# Patient Record
Sex: FEMALE | Race: WHITE | Employment: OTHER | URBAN - METROPOLITAN AREA
[De-identification: names, ages, dates, MRNs, and addresses within clinical notes are randomized per-mention and may not be internally consistent; named-entity substitution may affect disease eponyms.]

---

## 2017-02-01 RX ORDER — NITROGLYCERIN 0.4 MG/1
0.4 TABLET SUBLINGUAL
Qty: 25 TAB | Refills: 3 | Status: SHIPPED | OUTPATIENT
Start: 2017-02-01 | End: 2022-10-27

## 2017-03-16 ENCOUNTER — OFFICE VISIT (OUTPATIENT)
Dept: CARDIOLOGY CLINIC | Age: 82
End: 2017-03-16

## 2017-03-16 VITALS
DIASTOLIC BLOOD PRESSURE: 58 MMHG | HEIGHT: 66 IN | HEART RATE: 84 BPM | OXYGEN SATURATION: 98 % | BODY MASS INDEX: 25.07 KG/M2 | WEIGHT: 156 LBS | SYSTOLIC BLOOD PRESSURE: 120 MMHG

## 2017-03-16 DIAGNOSIS — I48.91 ATRIAL FIBRILLATION, UNSPECIFIED TYPE (HCC): Primary | ICD-10-CM

## 2017-03-30 ENCOUNTER — HOSPITAL ENCOUNTER (OUTPATIENT)
Dept: ULTRASOUND IMAGING | Age: 82
Discharge: HOME | End: 2017-03-30
Attending: UROLOGY
Payer: MEDICARE

## 2017-03-30 DIAGNOSIS — N32.81 OVERACTIVE BLADDER: ICD-10-CM

## 2017-03-30 DIAGNOSIS — R31.29 MICROSCOPIC HEMATURIA: ICD-10-CM

## 2017-03-30 PROCEDURE — 76770 US EXAM ABDO BACK WALL COMP: CPT

## 2017-04-10 ENCOUNTER — HOSPITAL ENCOUNTER (EMERGENCY)
Age: 82
Discharge: HOME | End: 2017-04-10
Attending: EMERGENCY MEDICINE
Payer: MEDICARE

## 2017-04-10 ENCOUNTER — APPOINTMENT (OUTPATIENT)
Dept: GENERAL RADIOLOGY | Age: 82
End: 2017-04-10
Attending: EMERGENCY MEDICINE
Payer: MEDICARE

## 2017-04-10 ENCOUNTER — APPOINTMENT (OUTPATIENT)
Dept: CT IMAGING | Age: 82
End: 2017-04-10
Attending: EMERGENCY MEDICINE
Payer: MEDICARE

## 2017-04-10 VITALS
OXYGEN SATURATION: 100 % | RESPIRATION RATE: 16 BRPM | DIASTOLIC BLOOD PRESSURE: 49 MMHG | TEMPERATURE: 97.8 F | SYSTOLIC BLOOD PRESSURE: 117 MMHG | HEART RATE: 82 BPM

## 2017-04-10 DIAGNOSIS — R53.1 WEAKNESS: Primary | ICD-10-CM

## 2017-04-10 LAB
ALBUMIN SERPL BCP-MCNC: 3.2 G/DL (ref 3.4–5)
ALBUMIN/GLOB SERPL: 1.1 {RATIO} (ref 0.8–1.7)
ALP SERPL-CCNC: 72 U/L (ref 45–117)
ALT SERPL-CCNC: 15 U/L (ref 13–56)
ANION GAP BLD CALC-SCNC: 5 MMOL/L (ref 3–18)
APPEARANCE UR: CLEAR
AST SERPL W P-5'-P-CCNC: 17 U/L (ref 15–37)
BASOPHILS # BLD AUTO: 0.1 K/UL (ref 0–0.1)
BASOPHILS # BLD: 1 % (ref 0–2)
BILIRUB SERPL-MCNC: 0.6 MG/DL (ref 0.2–1)
BILIRUB UR QL: NEGATIVE
BUN SERPL-MCNC: 13 MG/DL (ref 7–18)
BUN/CREAT SERPL: 16 (ref 12–20)
CALCIUM SERPL-MCNC: 8.2 MG/DL (ref 8.5–10.1)
CHLORIDE SERPL-SCNC: 104 MMOL/L (ref 100–108)
CK MB CFR SERPL CALC: 1.9 % (ref 0–4)
CK MB SERPL-MCNC: 2 NG/ML (ref 5–25)
CK SERPL-CCNC: 107 U/L (ref 26–192)
CO2 SERPL-SCNC: 33 MMOL/L (ref 21–32)
COLOR UR: YELLOW
CREAT SERPL-MCNC: 0.83 MG/DL (ref 0.6–1.3)
DIFFERENTIAL METHOD BLD: ABNORMAL
EOSINOPHIL # BLD: 0.2 K/UL (ref 0–0.4)
EOSINOPHIL NFR BLD: 2 % (ref 0–5)
ERYTHROCYTE [DISTWIDTH] IN BLOOD BY AUTOMATED COUNT: 13.5 % (ref 11.6–14.5)
GLOBULIN SER CALC-MCNC: 3 G/DL (ref 2–4)
GLUCOSE SERPL-MCNC: 133 MG/DL (ref 74–99)
GLUCOSE UR STRIP.AUTO-MCNC: NEGATIVE MG/DL
HCT VFR BLD AUTO: 39 % (ref 35–45)
HGB BLD-MCNC: 12.4 G/DL (ref 12–16)
HGB UR QL STRIP: NEGATIVE
KETONES UR QL STRIP.AUTO: NEGATIVE MG/DL
LEUKOCYTE ESTERASE UR QL STRIP.AUTO: NEGATIVE
LYMPHOCYTES # BLD AUTO: 49 % (ref 21–52)
LYMPHOCYTES # BLD: 5.3 K/UL (ref 0.9–3.6)
MCH RBC QN AUTO: 34.2 PG (ref 24–34)
MCHC RBC AUTO-ENTMCNC: 31.8 G/DL (ref 31–37)
MCV RBC AUTO: 107.4 FL (ref 74–97)
MONOCYTES # BLD: 1 K/UL (ref 0.05–1.2)
MONOCYTES NFR BLD AUTO: 9 % (ref 3–10)
NEUTS SEG # BLD: 4.2 K/UL (ref 1.8–8)
NEUTS SEG NFR BLD AUTO: 39 % (ref 40–73)
NITRITE UR QL STRIP.AUTO: NEGATIVE
PH UR STRIP: 5.5 [PH] (ref 5–8)
PLATELET # BLD AUTO: 343 K/UL (ref 135–420)
PMV BLD AUTO: 9.9 FL (ref 9.2–11.8)
POTASSIUM SERPL-SCNC: 3.6 MMOL/L (ref 3.5–5.5)
PROT SERPL-MCNC: 6.2 G/DL (ref 6.4–8.2)
PROT UR STRIP-MCNC: NEGATIVE MG/DL
RBC # BLD AUTO: 3.63 M/UL (ref 4.2–5.3)
SODIUM SERPL-SCNC: 142 MMOL/L (ref 136–145)
SP GR UR REFRACTOMETRY: 1.01 (ref 1–1.03)
TROPONIN I SERPL-MCNC: <0.02 NG/ML (ref 0–0.04)
UROBILINOGEN UR QL STRIP.AUTO: 0.2 EU/DL (ref 0.2–1)
WBC # BLD AUTO: 10.8 K/UL (ref 4.6–13.2)

## 2017-04-10 PROCEDURE — 85025 COMPLETE CBC W/AUTO DIFF WBC: CPT | Performed by: EMERGENCY MEDICINE

## 2017-04-10 PROCEDURE — 96360 HYDRATION IV INFUSION INIT: CPT

## 2017-04-10 PROCEDURE — 93005 ELECTROCARDIOGRAM TRACING: CPT

## 2017-04-10 PROCEDURE — 80053 COMPREHEN METABOLIC PANEL: CPT | Performed by: EMERGENCY MEDICINE

## 2017-04-10 PROCEDURE — 74011250636 HC RX REV CODE- 250/636: Performed by: EMERGENCY MEDICINE

## 2017-04-10 PROCEDURE — 71010 XR CHEST PORT: CPT

## 2017-04-10 PROCEDURE — 99285 EMERGENCY DEPT VISIT HI MDM: CPT

## 2017-04-10 PROCEDURE — 70450 CT HEAD/BRAIN W/O DYE: CPT

## 2017-04-10 PROCEDURE — 82550 ASSAY OF CK (CPK): CPT | Performed by: EMERGENCY MEDICINE

## 2017-04-10 PROCEDURE — 81003 URINALYSIS AUTO W/O SCOPE: CPT | Performed by: EMERGENCY MEDICINE

## 2017-04-10 RX ADMIN — SODIUM CHLORIDE 1000 ML: 900 INJECTION, SOLUTION INTRAVENOUS at 20:20

## 2017-04-10 NOTE — ED PROVIDER NOTES
HPI Comments: 5:59 PM Teresita Solis is a 80 y.o. female with h/o HTN, incontinence and spinal stenosis  who presents to ED via EMS complaining of dizziness onset \"several weeks ago. \" She states she has had several syncopal episodes the past few days. Per relative the patient fell last night. Relative states the patient fell 4 times yesterday. Per relative the fall was witnessed by the pt's . The pt's  was unable to get the patient up after falling last night, so the patient \"slept on the floor last night. \" Relative reports the pt hit her head last night, but denies LOC. The pt and  live at home alone. Per relative she told the pt's PCP about today's complaints and the PCP referred the pt to the ED. Relative reports the pt has been incontinent for the past two months. She is followed by Dr. Eryn Armijo, [facility] [place]. Pt denies dysuria, hematuria, headaches, change in appetite and fever. Relative also reports the pt's \"balance has been off today and has had trouble holding a fork and cup today on the L side. \" Relative states this is new. The relative also reports the pt being fatigued, confused and weak today. The pt has a L foot injury from a prior fall several weeks ago. Pt saw her podiatrist, Dr. Iona Titus, after the incident. The Pt reports L great toe pain. Relative denies the pt being on pain medication. No other concerns nor complaints at this time. PCP: Ruiz Yanez MD      The history is provided by the patient and a relative. Past Medical History:   Diagnosis Date    Atypical angina ([address]     Bulging disc 9/21/2010    Cardiac catheterization 10/16/1990    Patent coronary arteries. Normal LV function.  Cardiac echocardiogram 10/02/2012    EF 60%. No WMA. Mild-mod LAE.  Cardiac nuclear imaging test, low risk 10/02/2012    No ischemia or prior infarction. EF 77%. No WMA. Low risk pharm stress test.  Profound vagal response to Lexiscan.     Carotid duplex 01/25/2006    No significant stenosis bilaterally.  Dizziness     Dyspnea     Fecal incontinence     Follow-up exam 9/21/2010    Hematuria     Hepatitis B     Hip pain 4/13/2010    Hypertension     Overactive bladder     Spinal stenosis        Past Surgical History:   Procedure Laterality Date    BREAST SURGERY PROCEDURE UNLISTED      both breasts 3 times each    CT ABD PELV W CONT      surgery for diverticulitis    HX APPENDECTOMY      HX BACK SURGERY      HX CHOLECYSTECTOMY      HX HEART CATHETERIZATION  10/16/90    HX ORTHOPAEDIC  [id number]    5 back surgeries    HX ORTHOPAEDIC  3/06    left rotator cuff repair         Family History:   Problem Relation Age of Onset    Other Mother      hx of heart disorder    Hypertension Mother     Stroke Mother     Other Father      hx of heart disorder    Hypertension Father     Diabetes Father     Cancer Other        Social History     Social History    Marital status:      Spouse name: N/A    Number of children: N/A    Years of education: N/A     Occupational History    Not on file. Social History Main Topics    Smoking status: Former Smoker     Packs/day: 1.00     Years: 6.00     Quit date: 12/14/1971    Smokeless tobacco: Never Used    Alcohol use 1.5 oz/week     3 Glasses of wine per week    Drug use: No    Sexual activity: Not on file     Other Topics Concern    Not on file     Social History Narrative         ALLERGIES: Iodinated contrast media - oral and iv dye    Review of Systems   Constitutional: Positive for fatigue. Negative for chills, fever and unexpected weight change. HENT: Negative for congestion and rhinorrhea. Respiratory: Negative for chest tightness and shortness of breath. Cardiovascular: Negative for chest pain, palpitations and leg swelling. Gastrointestinal: Negative for abdominal pain, nausea and vomiting. Genitourinary: Negative for dysuria.    Musculoskeletal: Negative for back pain. L great toe   Skin: Negative for rash. Neurological: Positive for dizziness, syncope and weakness (generalized). Psychiatric/Behavioral: Positive for confusion. The patient is not nervous/anxious. All other systems reviewed and are negative. Vitals:    04/10/17 1733   BP: 120/55   Pulse: 89   Resp: 15   Temp: 97.8 °F (36.6 °C)            Physical Exam   Constitutional: She is oriented to person, place, and time. She appears lethargic. Non-toxic appearance. She does not have a sickly appearance. She appears ill. No distress. HENT:   Head: Normocephalic. Right Ear: No hemotympanum. Left Ear: No hemotympanum. Mouth/Throat: Oropharynx is clear and moist. No oropharyngeal exudate. Eyes: Conjunctivae and EOM are normal. Pupils are equal, round, and reactive to light. No scleral icterus. Neck: Trachea normal and normal range of motion. Neck supple. No hepatojugular reflux and no JVD present. No tracheal deviation present. No thyromegaly present. Cardiovascular: Normal rate, regular rhythm, S1 normal, S2 normal, normal heart sounds, intact distal pulses and normal pulses. Exam reveals no gallop, no S3 and no S4. No murmur heard. Pulses:       Radial pulses are 2+ on the right side, and 2+ on the left side. Dorsalis pedis pulses are 2+ on the right side, and 2+ on the left side. Pulmonary/Chest: Effort normal and breath sounds normal. No respiratory distress. She has no decreased breath sounds. She has no wheezes. She has no rhonchi. She has no rales. Abdominal: Soft. Normal appearance and bowel sounds are normal. She exhibits no distension, no fluid wave, no ascites and no mass. There is no hepatosplenomegaly. There is no tenderness. There is no rigidity, no rebound, no guarding, no CVA tenderness, no tenderness at McBurney's point and negative Draper's sign. Musculoskeletal: She exhibits no edema or tenderness.    Strength 3/5 throughout    Lymphadenopathy: Head (right side): No submental, no submandibular, no preauricular and no occipital adenopathy present. Head (left side): No submental, no submandibular, no preauricular and no occipital adenopathy present. She has no cervical adenopathy. Right: No supraclavicular adenopathy present. Left: No supraclavicular adenopathy present. Neurological: She is oriented to person, place, and time. She has normal strength and normal reflexes. She appears lethargic. She is not disoriented. No cranial nerve deficit or sensory deficit. Coordination and gait normal. GCS eye subscore is 3. GCS verbal subscore is 4. GCS motor subscore is 6. Grossly intact    Skin: Skin is warm, dry and intact. No rash noted. She is not diaphoretic. Psychiatric: She has a normal mood and affect. Her speech is normal and behavior is normal. Judgment and thought content normal. Cognition and memory are impaired. Nursing note and vitals reviewed. MDM  Number of Diagnoses or Management Options  Weakness:   Diagnosis management comments: UTI  Infection   Intracranial bleed  Neoplasm     ED Course       Procedures   Labs essentially normal. Cardiac enzymes were negative. UA  Was negative. Chest X-Ray showed No acute process. EKG showed NSR with rate of 86 bpm. With no ST elevations or depressions. CT of the head showed No acute intracranial process. Mild sequela of chronic microvascular ischemic disease and moderate generalized volume loss. Mild sinonasal mucosal disease  8:50 PM 4/10/2017     I have reassessed the patient. Patient is feeling better. Patient was discharged in stable condition. Patient is to return to emergency department if any new or worsening condition.         Scribe Attestation  Tone Horton scribing for and in the presence of Lisbeth Schuster DO (04/10/17/ 5:59 PM)    Physician Attestation  I personally performed the services described in this documentation, reviewed, and edited the documentation which was dictated to the scribe in my presence, and it accurately records my own words and actions.      [address]o DO (04/10/17/ 5:59 PM)    Signed by: Edwin Mata, 04/10/17, 5:59 PM

## 2017-04-10 NOTE — ED TRIAGE NOTES
Patient arrived via EMS due to increasing generalized weakness and confusion since Saturday; falls x4 since Saturday. Per medic, no facial droop, no arm or leg drift, and patient has equal  strengths bilaterally. Glu 122. Patient c/o feeling dizzy and \"just dont feel well\".

## 2017-04-11 LAB
ATRIAL RATE: 86 BPM
CALCULATED P AXIS, ECG09: 43 DEGREES
CALCULATED R AXIS, ECG10: -24 DEGREES
CALCULATED T AXIS, ECG11: 16 DEGREES
DIAGNOSIS, 93000: NORMAL
P-R INTERVAL, ECG05: 148 MS
Q-T INTERVAL, ECG07: 364 MS
QRS DURATION, ECG06: 82 MS
QTC CALCULATION (BEZET), ECG08: 435 MS
VENTRICULAR RATE, ECG03: 86 BPM

## 2017-04-11 NOTE — DISCHARGE INSTRUCTIONS
Weakness: Care Instructions  Your Care Instructions  Weakness is a lack of physical or muscle strength. You may feel that you need to make extra effort to move your arms, legs, or other muscles. Generalized weakness means that you feel weak in most areas of your body. Another type of weakness may affect just one muscle or group of muscles. You may feel weak and tired after you have done too much activity, such as taking an extra-long hike. This is not a serious problem. It often goes away on its own. Feeling weak can also be caused by medical conditions like thyroid problems, depression, or a virus. Sometimes the cause can be serious. Your doctor may want to do more tests to try to find the cause of the weakness. The doctor has checked you carefully, but problems can develop later. If you notice any problems or new symptoms, get medical treatment right away. Follow-up care is a key part of your treatment and safety. Be sure to make and go to all appointments, and call your doctor if you are having problems. It's also a good idea to know your test results and keep a list of the medicines you take. How can you care for yourself at home? · Rest when you feel tired. · Be safe with medicines. If your doctor prescribed medicine, take it exactly as prescribed. Call your doctor if you think you are having a problem with your medicine. You will get more details on the specific medicines your doctor prescribes. · Do not skip meals. Eating a balanced diet may increase your energy level. · Get some physical activity every day, but do not get too tired. When should you call for help? Call your doctor now or seek immediate medical care if:  · You have new or worse weakness. · You are dizzy or lightheaded, or you feel like you may faint. Watch closely for changes in your health, and be sure to contact your doctor if:  · You do not get better as expected. Where can you learn more?   Go to http://saige.info/. Enter [id number] in the search box to learn more about \"Weakness: Care Instructions. \"  Current as of: May 27, 2016  Content Version: 11.2  © 7469-7772 [employer]. Care instructions adapted under license by [employer] (which disclaims liability or warranty for this information). If you have questions about a medical condition or this instruction, always ask your healthcare professional. [address] any warranty or liability for your use of this information.

## 2017-04-11 NOTE — ED NOTES
Nursing report received from [address] [place] [address]. Patient resting comfortably on the stretcher, no signs of acute distress noted. Will continue to monitor.

## 2017-08-08 ENCOUNTER — OFFICE VISIT (OUTPATIENT)
Dept: ORTHOPEDIC SURGERY | Age: 82
End: 2017-08-08

## 2017-08-08 VITALS
WEIGHT: 141 LBS | TEMPERATURE: 97.7 F | DIASTOLIC BLOOD PRESSURE: 60 MMHG | OXYGEN SATURATION: 92 % | HEART RATE: 84 BPM | SYSTOLIC BLOOD PRESSURE: 148 MMHG | HEIGHT: 65 IN | BODY MASS INDEX: 23.49 KG/M2

## 2017-08-08 DIAGNOSIS — M25.511 RIGHT SHOULDER PAIN, UNSPECIFIED CHRONICITY: ICD-10-CM

## 2017-08-08 DIAGNOSIS — M19.011 PRIMARY OSTEOARTHRITIS OF BOTH SHOULDERS: Primary | ICD-10-CM

## 2017-08-08 DIAGNOSIS — M25.512 LEFT SHOULDER PAIN, UNSPECIFIED CHRONICITY: ICD-10-CM

## 2017-08-08 DIAGNOSIS — M19.012 PRIMARY OSTEOARTHRITIS OF BOTH SHOULDERS: Primary | ICD-10-CM

## 2017-08-08 RX ORDER — TRIAMCINOLONE ACETONIDE 40 MG/ML
40 INJECTION, SUSPENSION INTRA-ARTICULAR; INTRAMUSCULAR ONCE
Qty: 1 ML | Refills: 0
Start: 2017-08-08 | End: 2017-08-08

## 2017-08-08 NOTE — PROGRESS NOTES
Zara Jones  8/17/1930   Chief Complaint   Patient presents with    Shoulder Pain     bilateral        HISTORY OF PRESENT ILLNESS  Zara Jones is a 80 y.o. female who presents today for evaluation of B/L shoulder pain L>R. Patient referred by Dr. Jace Merchant for further evaluation. she rates her pain 6/10 today. Pain has been present for a long time. Patient describes the pain as sharp and achy that is Constant in nature. Symptoms are worse with certain motions of the shoulders and is better with  Rest. Associated symptoms include stiffness, catching. Since problem started, it: has worsened. Pain does not wake patient up at night. Has not taken medication for the problem. H/o surgery on the right years ago  Has tried following treatments: Injections:NO; Brace:NO; Therapy:NO; Cane/Crutch:YES       Allergies   Allergen Reactions    Iodinated Contrast- Oral And Iv Dye Swelling     Swollen all over with welps        Past Medical History:   Diagnosis Date    Atypical angina ([address]     Bulging disc 9/21/2010    Cardiac catheterization 10/16/1990    Patent coronary arteries. Normal LV function.  Cardiac echocardiogram 10/02/2012    EF 60%. No WMA. Mild-mod LAE.  Cardiac nuclear imaging test, low risk 10/02/2012    No ischemia or prior infarction. EF 77%. No WMA. Low risk pharm stress test.  Profound vagal response to Lexiscan.  Carotid duplex 01/25/2006    No significant stenosis bilaterally.  Dizziness     Dyspnea     Fecal incontinence     Follow-up exam 9/21/2010    Hematuria     Hepatitis B     Hip pain 4/13/2010    Hypertension     Mixed stress and urge urinary incontinence     Overactive bladder     Spinal stenosis       Social History     Social History    Marital status:      Spouse name: N/A    Number of children: N/A    Years of education: N/A     Occupational History    Not on file.      Social History Main Topics    Smoking status: Former Smoker     Packs/day: 1.00     Years: 6.00     Quit date: 12/14/1971    Smokeless tobacco: Never Used    Alcohol use 1.5 oz/week     3 Glasses of wine per week    Drug use: No    Sexual activity: Not on file     Other Topics Concern    Not on file     Social History Narrative      Past Surgical History:   Procedure Laterality Date    BREAST SURGERY PROCEDURE UNLISTED      both breasts 3 times each    CT ABD PELV W CONT      surgery for diverticulitis    HX APPENDECTOMY      HX BACK SURGERY      HX CHOLECYSTECTOMY      HX HEART CATHETERIZATION  10/16/90    HX ORTHOPAEDIC  [id number]    5 back surgeries    HX ORTHOPAEDIC  3/06    left rotator cuff repair      Family History   Problem Relation Age of Onset    Other Mother      hx of heart disorder    Hypertension Mother     Stroke Mother     Other Father      hx of heart disorder    Hypertension Father     Diabetes Father     Cancer Other       Current Outpatient Prescriptions   Medication Sig    triamcinolone acetonide (KENALOG) 40 mg/mL injection 1 mL by IntraMUSCular route once for 1 dose.  nitroglycerin (NITROSTAT) 0.4 mg SL tablet 1 Tab by SubLINGual route every five (5) minutes as needed.  aspirin delayed-release 81 mg tablet Take 1 Tab by mouth daily.  memantine (NAMENDA) 10 mg tablet Take 10 mg by mouth daily.  cholecalciferol, vitamin D3, (VITAMIN D-3) 2,000 unit Tab Take 1 Tab by mouth daily.  meloxicam (MOBIC) 15 mg tablet Take 15 mg by mouth daily.  MULTIVITAMIN PO Take 1 Tab by mouth daily.  donepezil (ARICEPT) 10 mg tablet Take 10 mg by mouth nightly.  meclizine (ANTIVERT) 25 mg tablet Take 25 mg by mouth as needed.  risedronate (ACTONEL) 35 mg tablet Take 35 mg by mouth every Sunday.  solifenacin (VESICARE) 10 mg tablet Take 1 Tab by mouth daily.  mirabegron ER (MYRBETRIQ) 50 mg ER tablet Take 1 Tab by mouth daily.  levoFLOXacin (LEVAQUIN) 500 mg tablet Take 1 Tab by mouth daily.     traMADol (ULTRAM) 50 mg tablet Take 1 Tab by mouth every six (6) hours as needed for Pain. Max Daily Amount: 200 mg.    mirabegron (MYRBETRIQ) 25 mg tablet Take 1 Tab by mouth daily.  omeprazole (PRILOSEC) 20 mg capsule Take 20 mg by mouth daily.  BETA-CAROTENE,A, W-C & E/MIN (ICAPS PO) Take 1 Tab by mouth daily.  metoprolol-XL (TOPROL XL) 50 mg XL tablet Take 50 mg by mouth daily. No current facility-administered medications for this visit. REVIEW OF SYSTEM   Patient denies: Weight loss, Fever/Chills, HA, Visual changes, Fatigue, Chest pain, SOB, Abdominal pain, N/V/D/C, Blood in stool or urine, Edema. Pertinent positive as above in HPI. All others were negative    PHYSICAL EXAM:   Visit Vitals    /60    Pulse 84    Temp 97.7 °F (36.5 °C)    Ht 5' 5\" (1.651 m)    Wt 141 lb (64 kg)    SpO2 92%    BMI 23.46 kg/m2     The patient is a well-developed, well-nourished female   in no acute distress. The patient is alert and oriented times three. The patient is alert and oriented times three. Mood and affect are normal.  LYMPHATIC: lymph nodes are not enlarged and are within normal limits  SKIN: normal in color and non tender to palpation. There are no bruises or abrasions noted. NEUROLOGICAL: Motor sensory exam is within normal limits. Reflexes are equal bilaterally.  There is normal sensation to pinprick and light touch  MUSCULOSKELETAL:  Examination Left shoulder Right shoulder   Skin Intact Intact   AC joint tenderness - -   Biceps tenderness - -   Forward flexion/Elevation  100   1 110 100   Glenohumeral abduction 70 70   External rotation ROM 30 30   Internal rotation ROM 30 30   Apprehension - -   Traviss Relocation - -   Jerk - -   Load and Shift - -   Obriens - -   Speeds - -   Impingement sign + +   Supraspinatus/Empty Can ++ ++   External Rotation Strength -, 5/5 -, 5/5   Lift Off/Belly Press -, 5/5 -, 5/5   Neurovascular Intact Intact          PROCEDURE: After sterile prep, 6 cc of Xylocaine and 1 cc of Kenalog were injected into the bilateral  shoulders. [facility]  OFFICE PROCEDURE PROGRESS NOTE        Chart reviewed for the following:  Franchesca Burch MD, have reviewed the History, Physical and updated the Allergic reactions for [address] performed immediately prior to start of procedure:  Franchesca Burch MD, have performed the following reviews on Ayah Burden prior to the start of the procedure:            * Patient was identified by name and date of birth   * Agreement on procedure being performed was verified  * Risks and Benefits explained to the patient  * Procedure site verified and marked as necessary  * Patient was positioned for comfort  * Consent was signed and verified     Time: 3:27 PM    Date of procedure: 8/8/2017    Procedure performed by:  Eros Faith MD    Provider assisted by: (see medication administration)    How tolerated by patient: tolerated the procedure well with no complications    Comments: none      IMAGING:   XR of the B/L shoulders dated 8/8/17 was reviewed and read: marked degenerative OA of both shoulders with metallic implants on the right      IMPRESSION:      ICD-10-CM ICD-9-CM    1. Primary osteoarthritis of both shoulders M19.011 715.11 TRIAMCINOLONE ACETONIDE INJ    M19.012  triamcinolone acetonide (KENALOG) 40 mg/mL injection      DRAIN/INJECT LARGE JOINT/BURSA   2. Right shoulder pain, unspecified chronicity M25.511 719.41 AMB POC XRAY, SHOULDER; COMPLETE, 2+   3. Left shoulder pain, unspecified chronicity M25.512 719.41 AMB POC XRAY, SHOULDER; COMPLETE, 2+        PLAN:  1. Patient experiencing worsening B/L shoulder pain. Risk factors include: hypertension  2. Yes cortisone injection indicated today: B/L shoulders   3. No Physical/Occupational Therapy indicated today  4. No diagnostic test indicated today  5.  No durable medical equipment indicated today  6. No referral indicated today   7. No medications indicated today  8. No Narcotic indicated today. RTC PRN  Office note will be sent to the referring provider  Follow-up Disposition: Not on File    Scribed by Lelo Lemus[address]) as dictated by Reese Diaz MD    I, Dr. Reese Diaz, confirm that all documentation is accurate.     Reese Diaz M.D.   [facility]

## 2017-08-08 NOTE — PATIENT INSTRUCTIONS
Arthritis: Care Instructions  Your Care Instructions  Arthritis, also called osteoarthritis, is a breakdown of the cartilage that cushions your joints. When the cartilage wears down, your bones rub against each other. This causes pain and stiffness. Many people have some arthritis as they age. Arthritis most often affects the joints of the spine, hands, hips, knees, or feet. You can take simple measures to protect your joints, ease your pain, and help you stay active. Follow-up care is a key part of your treatment and safety. Be sure to make and go to all appointments, and call your doctor if you are having problems. It's also a good idea to know your test results and keep a list of the medicines you take. How can you care for yourself at home? · Stay at a healthy weight. Being overweight puts extra strain on your joints. · Talk to your doctor or physical therapist about exercises that will help ease joint pain. ¨ Stretch. You may enjoy gentle forms of yoga to help keep your joints and muscles flexible. ¨ Walk instead of jog. Other types of exercise that are less stressful on the joints include riding a bicycle, swimming, cory chi, or water exercise. ¨ Lift weights. Strong muscles help reduce stress on your joints. Stronger thigh muscles, for example, take some of the stress off of the knees and hips. Learn the right way to lift weights so you do not make joint pain worse. · Take your medicines exactly as prescribed. Call your doctor if you think you are having a problem with your medicine. · Take pain medicines exactly as directed. ¨ If the doctor gave you a prescription medicine for pain, take it as prescribed. ¨ If you are not taking a prescription pain medicine, ask your doctor if you can take an over-the-counter medicine. · Use a cane, crutch, walker, or another device if you need help to get around. These can help rest your joints.  You also can use other things to make life easier, such as a higher toilet seat and padded handles on kitchen utensils. · Do not sit in low chairs, which can make it hard to get up. · Put heat or cold on your sore joints as needed. Use whichever helps you most. You also can take turns with hot and cold packs. ¨ Apply heat 2 or 3 times a day for 20 to 30 minutes--using a heating pad, hot shower, or hot pack--to relieve pain and stiffness. ¨ Put ice or a cold pack on your sore joint for 10 to 20 minutes at a time. Put a thin cloth between the ice and your skin. When should you call for help? Call your doctor now or seek immediate medical care if:  · You have sudden swelling, warmth, or pain in any joint. · You have joint pain and a fever or rash. · You have such bad pain that you cannot use a joint. Watch closely for changes in your health, and be sure to contact your doctor if:  · You have mild joint symptoms that continue even with more than 6 weeks of care at home. · You have stomach pain or other problems with your medicine. Where can you learn more? Go to http://sanjuana-lauren.info/. Enter V020 in the search box to learn more about \"Arthritis: Care Instructions. \"  Current as of: November 28, 2016  Content Version: 11.3  © 6062-4178 [employer]. Care instructions adapted under license by [employer] (which disclaims liability or warranty for this information). If you have questions about a medical condition or this instruction, always ask your healthcare professional. [place] [address] any warranty or liability for your use of this information.

## 2019-07-08 ENCOUNTER — OFFICE VISIT (OUTPATIENT)
Dept: ORTHOPEDIC SURGERY | Age: 84
End: 2019-07-08

## 2019-07-08 VITALS
DIASTOLIC BLOOD PRESSURE: 56 MMHG | RESPIRATION RATE: 18 BRPM | HEIGHT: 65 IN | BODY MASS INDEX: 22.47 KG/M2 | SYSTOLIC BLOOD PRESSURE: 145 MMHG | HEART RATE: 74 BPM | TEMPERATURE: 97.5 F

## 2019-07-08 DIAGNOSIS — M25.511 BILATERAL SHOULDER PAIN, UNSPECIFIED CHRONICITY: Primary | ICD-10-CM

## 2019-07-08 DIAGNOSIS — M25.512 BILATERAL SHOULDER PAIN, UNSPECIFIED CHRONICITY: Primary | ICD-10-CM

## 2019-07-08 DIAGNOSIS — M79.18 CERVICAL MYOFASCIAL PAIN SYNDROME: ICD-10-CM

## 2019-07-08 DIAGNOSIS — M54.2 NECK PAIN: ICD-10-CM

## 2019-07-08 RX ORDER — PREDNISONE 10 MG/1
TABLET ORAL
Qty: 21 TAB | Refills: 0 | Status: SHIPPED | OUTPATIENT
Start: 2019-07-08 | End: 2019-07-14

## 2019-07-08 NOTE — PROGRESS NOTES
[address]  [address], [address]  [place]  Phone: [phone]  Fax: [phone]        Abby Mtz  : 1930  PCP: Sherlyn Nunn MD  2019    NEW PATIENT      HISTORY OF PRESENT ILLNESS  Judithe Mcburney is a 80 y.o. female c/o BUE pain that she localizes to her biceps - she notes that it is not her shoulder or her elbow, only the area in between. She notes that she sometimes has this pain while driving. She denies pain reproduced with movement of her neck, but moving her arms will reproduce pain. On examination, she has limited ROM of her shoulders bilaterally, and a positive Castro sign bilaterally. She also c/o neck pain localized near the C7 process. She previously saw Dr. Robbie Saldana many years ago for her neck and low back pain. She notes that she has seen Dr. Rosemary Isaac in the past for her shoulder pain, and he provided an injection that did not alleviate the pain. She has h/o rotator cuff repair. She denies h/o diabetes. She notes that she is unable to move her middle and ring finger on her left hand. She has h/o broken left wrist that may have caused trauma to tendons controlling these fingers. We discussed the option of potential cervical facet injections, but she is not interested in injections. She rates her pain as a 4/10 today. ASSESSMENT  Her symptoms are likely due to a rotator cuff pathology and compensatory cervical myofascial pain. PLAN  1. Referral to Dr. Brandon Jaimes for bilateral shoulder pain  2. 10 mg Prednisone taper   3. Provided exercises to add to HEP. Pt will f/u PRN. Diagnoses and all orders for this visit:    1. Bilateral shoulder pain, unspecified chronicity  -     REFERRAL TO SPORTS MEDICINE  -     predniSONE (DELTASONE) 10 mg tablet;  Take 60 mg by mouth daily (after breakfast) for 1 day, THEN 50 mg daily (after breakfast) for 1 day, THEN 40 mg daily (after breakfast) for 1 day, THEN 30 mg daily (after breakfast) for 1 day, THEN 20 mg daily (after breakfast) for 1 day, THEN 10 mg daily (after breakfast) for 1 day. 2. Neck pain  -     AMB POC XRAY, SPINE, CERVICAL; 2 OR 3  -     predniSONE (DELTASONE) 10 mg tablet; Take 60 mg by mouth daily (after breakfast) for 1 day, THEN 50 mg daily (after breakfast) for 1 day, THEN 40 mg daily (after breakfast) for 1 day, THEN 30 mg daily (after breakfast) for 1 day, THEN 20 mg daily (after breakfast) for 1 day, THEN 10 mg daily (after breakfast) for 1 day. 3. Cervical myofascial pain syndrome       CHIEF COMPLAINT  Caroline Oscar is seen today in consultation at the request of Oniel Soria MD for complaints of BUE pain. PAST MEDICAL HISTORY   Past Medical History:   Diagnosis Date    Atypical angina ([address]     Bulging disc 9/21/2010    Cardiac catheterization 10/16/1990    Patent coronary arteries. Normal LV function.  Cardiac echocardiogram 10/02/2012    EF 60%. No WMA. Mild-mod LAE.  Cardiac nuclear imaging test, low risk 10/02/2012    No ischemia or prior infarction. EF 77%. No WMA. Low risk pharm stress test.  Profound vagal response to Lexiscan.  Carotid duplex 01/25/2006    No significant stenosis bilaterally.     Dizziness     Dyspnea     Fecal incontinence     Follow-up exam 9/21/2010    Hematuria     Hepatitis B     Hip pain 4/13/2010    Hypertension     Mixed stress and urge urinary incontinence     Overactive bladder     Spinal stenosis        Past Surgical History:   Procedure Laterality Date    BREAST SURGERY PROCEDURE UNLISTED      both breasts 3 times each    CT ABD PELV W CONT      surgery for diverticulitis    HX APPENDECTOMY      HX BACK SURGERY      HX CHOLECYSTECTOMY      HX HEART CATHETERIZATION  10/16/90    HX ORTHOPAEDIC  [id number]    5 back surgeries    HX ORTHOPAEDIC  3/06    left rotator cuff repair       MEDICATIONS    Current Outpatient Medications   Medication Sig Dispense Refill    solifenacin (VESICARE) 10 mg tablet Take 1 Tab by mouth daily. 90 Tab 3    amoxicillin (AMOXIL) 875 mg tablet       benzonatate (TESSALON) 100 mg capsule       colestipol (COLESTID) 1 gram tablet   0    mirabegron ER (MYRBETRIQ) 50 mg ER tablet Take 1 Tab by mouth daily. 90 Tab 3    levoFLOXacin (LEVAQUIN) 500 mg tablet Take 1 Tab by mouth daily. 10 Tab 0    nitroglycerin (NITROSTAT) 0.4 mg SL tablet 1 Tab by SubLINGual route every five (5) minutes as needed. 25 Tab 3    traMADol (ULTRAM) 50 mg tablet Take 1 Tab by mouth every six (6) hours as needed for Pain. Max Daily Amount: 200 mg. 6 Tab 0    mirabegron (MYRBETRIQ) 25 mg tablet Take 1 Tab by mouth daily. 60 Tab 1    aspirin delayed-release 81 mg tablet Take 1 Tab by mouth daily. 30 Tab 1    omeprazole (PRILOSEC) 20 mg capsule Take 20 mg by mouth daily.  memantine (NAMENDA) 10 mg tablet Take 10 mg by mouth daily.  BETA-CAROTENE,A, W-C & E/MIN (ICAPS PO) Take 1 Tab by mouth daily.  cholecalciferol, vitamin D3, (VITAMIN D-3) 2,000 unit Tab Take 1 Tab by mouth daily.  meloxicam (MOBIC) 15 mg tablet Take 15 mg by mouth daily.  MULTIVITAMIN PO Take 1 Tab by mouth daily.  metoprolol-XL (TOPROL XL) 50 mg XL tablet Take 50 mg by mouth daily.  donepezil (ARICEPT) 10 mg tablet Take 10 mg by mouth nightly.  meclizine (ANTIVERT) 25 mg tablet Take 25 mg by mouth as needed.  risedronate (ACTONEL) 35 mg tablet Take 35 mg by mouth every Sunday.          ALLERGIES  Allergies   Allergen Reactions    Iodinated Contrast- Oral And Iv Dye Swelling     Swollen all over with welps          SOCIAL HISTORY    Social History     Socioeconomic History    Marital status:      Spouse name: Not on file    Number of children: Not on file    Years of education: Not on file    Highest education level: Not on file   Tobacco Use    Smoking status: Former Smoker     Packs/day: 1.00     Years: 6.00     Pack years: 6.00 Last attempt to quit: 1971     Years since quittin.11    Smokeless tobacco: Never Used   Substance and Sexual Activity    Alcohol use: Yes     Alcohol/week: 1.5 oz     Types: 3 Glasses of wine per week    Drug use: No       FAMILY HISTORY  Family History   Problem Relation Age of Onset    Other Mother         hx of heart disorder    Hypertension Mother     Stroke Mother     Other Father         hx of heart disorder    Hypertension Father     Diabetes Father     Cancer Other          REVIEW OF SYSTEMS  Review of Systems   Musculoskeletal: Positive for neck pain. BUE pain (related to shoulder pain)         PHYSICAL EXAMINATION  Visit Vitals  /56   Pulse 74   Temp 97.5 °F (36.4 °C) (Oral)   Resp 18   Ht 5' 5\" (1.651 m)   BMI 22.47 kg/m²         Pain Assessment  2019   Location of Pain Neck;Arm   Location Modifiers -   Severity of Pain 4   Quality of Pain Aching; Other (Comment)   Quality of Pain Comment cramping stabbing   Duration of Pain Persistent   Frequency of Pain Constant   Relieving Factors -   Relieving Factors Comment -         Constitutional:  Well developed, well nourished, in no acute distress. Psychiatric: Affect and mood are appropriate. HEENT: Normocephalic, atraumatic. Extraocular movements intact. Integumentary: No rashes or abrasions noted on exposed areas. Cardiovascular: Regular rate and rhythm. Pulmonary: Clear to auscultation bilaterally. SPINE/MUSCULOSKELETAL EXAM    Cervical spine:  Neck is midline. Normal muscle tone. No focal atrophy is noted. ROM pain free. Shoulder ROM limited bilaterally. Mild tenderness to palpation of cervical region. Negative Spurling's sign. Negative Tinel's sign. Negative Thakkar's sign. Positive Castro sign bilaterally. Sensation in the bilateral arms grossly intact to light touch. Lumbar spine:  No rash, ecchymosis, or gross obliquity. No fasciculations.    No focal atrophy is noted.   No pain with hip ROM. Full range of motion. No tenderness to palpation. No tenderness to palpation at the sciatic notch. SI joints non-tender. Trochanters non tender. Sensation in the bilateral legs grossly intact to light touch. MOTOR:      Biceps  Triceps Deltoids Wrist Ext Wrist Flex Hand Intrin   Right 5/5 5/5 5/5 5/5 5/5 5/5   Left 5/5 5/5 5/5 5/5 5/5 5/5             Hip Flex  Quads Hamstrings Ankle DF EHL Ankle PF   Right 5/5 5/5 5/5 5/5 5/5 5/5   Left 5/5 5/5 5/5 5/5 5/5 5/5     DTRs are 2+ biceps, triceps, brachioradialis, patella, and Achilles. Negative Straight Leg raise. Squat not tested. No difficulty with tandem gait. Ambulation with single point cane. FWB. RADIOGRAPHS  2V Cervical XR images taken on 7/8/19 personally reviewed with patient:  Straightening of cervical lordosis  Facet sclerosis  Otherwise normal.      reviewed    Ms. Teofilo Michael has a reminder for a \"due or due soon\" health maintenance. I have asked that she contact her primary care provider for follow-up on this health maintenance. 16 minutes of face-to-face contact were spent with the patient during today's visit extensively discussing symptoms and treatment plan. All questions were answered. More than half of this visit today was spent on counseling. Written by Marie De La Vega, as dictated by Dr. Mauricio Johnston. I, Dr. Mauricio Johnston, confirm that all documentation is accurate.

## 2019-07-08 NOTE — PATIENT INSTRUCTIONS
Rotator Cuff: Exercises  Your Care Instructions  Here are some examples of typical rehabilitation exercises for your condition. Start each exercise slowly. Ease off the exercise if you start to have pain. Your doctor or physical therapist will tell you when you can start these exercises and which ones will work best for you. How to do the exercises  Pendulum swing    1. Hold on to a table or the back of a chair with your good arm. Then bend forward a little and let your sore arm hang straight down. This exercise does not use the arm muscles. Rather, use your legs and your hips to create movement that makes your arm swing freely. 2. Use the movement from your hips and legs to guide the slightly swinging arm back and forth like a pendulum (or elephant trunk). Then guide it in circles that start small (about the size of a dinner plate). Make the circles a bit larger each day, as your pain allows. 3. Do this exercise for 5 minutes, 5 to 7 times each day. 4. As you have less pain, try bending over a little farther to do this exercise. This will increase the amount of movement at your shoulder. Posterior stretching exercise    1. Hold the elbow of your injured arm with your other hand. 2. Use your hand to pull your injured arm gently up and across your body. You will feel a gentle stretch across the back of your injured shoulder. 3. Hold for at least 15 to 30 seconds. Then slowly lower your arm. 4. Repeat 2 to 4 times. Up-the-back stretch    1. Put your hand in your back pocket. Let it rest there to stretch your shoulder. 2. With your other hand, hold your injured arm (palm outward) behind your back by the wrist. Pull your arm up gently to stretch your shoulder. 3. Next, put a towel over your other shoulder. Put the hand of your injured arm behind your back. Now hold the back end of the towel. With the other hand, hold the front end of the towel in front of your body.  Pull gently on the front end of the towel. This will bring your hand farther up your back to stretch your shoulder. Overhead stretch    1. Standing about an arm's length away, grasp onto a solid surface. You could use a countertop, a doorknob, or the back of a sturdy chair. 2. With your knees slightly bent, bend forward with your arms straight. Lower your upper body, and let your shoulders stretch. 3. As your shoulders are able to stretch farther, you may need to take a step or two backward. 4. Hold for at least 15 to 30 seconds. Then stand up and relax. If you had stepped back during your stretch, step forward so you can keep your hands on the solid surface. 5. Repeat 2 to 4 times. Shoulder flexion (lying down)    1. Lie on your back, holding a wand with both hands. Your palms should face down as you hold the wand. 2. Keeping your elbows straight, slowly raise your arms over your head. Raise them until you feel a stretch in your shoulders, upper back, and chest.  3. Hold for 15 to 30 seconds. 4. Repeat 2 to 4 times. Shoulder rotation (lying down)    1. Lie on your back. Hold a wand with both hands with your elbows bent and palms up. 2. Keep your elbows close to your body, and move the wand across your body toward the sore arm. 3. Hold for 8 to 12 seconds. 4. Repeat 2 to 4 times. Wall climbing (to the side)    1. Stand with your side to a wall so that your fingers can just touch it at an angle about 30 degrees toward the front of your body. 2. Walk the fingers of your injured arm up the wall as high as pain permits. Try not to shrug your shoulder up toward your ear as you move your arm up. 3. Hold that position for a count of at least 15 to 20.  4. Walk your fingers back down to the starting position. 5. Repeat at least 2 to 4 times. Try to reach higher each time. Wall climbing (to the front)    1. Face a wall, and stand so your fingers can just touch it.   2. Keeping your shoulder down, walk the fingers of your injured arm up the wall as high as pain permits. (Don't shrug your shoulder up toward your ear.)  3. Hold your arm in that position for at least 15 to 30 seconds. 4. Slowly walk your fingers back down to where you started. 5. Repeat at least 2 to 4 times. Try to reach higher each time. Shoulder blade squeeze    1. Stand with your arms at your sides, and squeeze your shoulder blades together. Do not raise your shoulders up as you squeeze. 2. Hold 6 seconds. 3. Repeat 8 to 12 times. Scapular exercise: Arm reach    1. Lie flat on your back. This exercise is a very slight motion that starts with your arms raised (elbows straight, arms straight). 2. From this position, reach higher toward the vernon or ceiling. Keep your elbows straight. All motion should be from your shoulder blade only. 3. Relax your arms back to where you started. 4. Repeat 8 to 12 times. Arm raise to the side    1. Slowly raise your injured arm to the side, with your thumb facing up. Raise your arm 60 degrees at the most (shoulder level is 90 degrees). 2. Hold the position for 3 to 5 seconds. Then lower your arm back to your side. If you need to, bring your \"good\" arm across your body and place it under the elbow as you lower your injured arm. Use your good arm to keep your injured arm from dropping down too fast.  3. Repeat 8 to 12 times. 4. When you first start out, don't hold any extra weight in your hand. As you get stronger, you may use a 1-pound to 2-pound dumbbell or a small can of food. Shoulder flexor and extensor exercise    1. Push forward (flex): Stand facing a wall or doorjamb, about 6 inches or less back. Hold your injured arm against your body. Make a closed fist with your thumb on top. Then gently push your hand forward into the wall with about 25% to 50% of your strength. Don't let your body move backward as you push. Hold for about 6 seconds. Relax for a few seconds. Repeat 8 to 12 times.   2. Push backward (extend): Stand with your back flat against a wall. Your upper arm should be against the wall, with your elbow bent 90 degrees (your hand straight ahead). Push your elbow gently back against the wall with about 25% to 50% of your strength. Don't let your body move forward as you push. Hold for about 6 seconds. Relax for a few seconds. Repeat 8 to 12 times. Scapular exercise: Wall push-ups    1. Stand facing a wall, about 12 inches to 18 inches away. 2. Place your hands on the wall at shoulder height. 3. Slowly bend your elbows and bring your face to the wall. Keep your back and hips straight. 4. Push back to where you started. 5. Repeat 8 to 12 times. 6. When you can do this exercise against a wall comfortably, you can try it against a counter. You can then slowly progress to the end of a couch, then to a sturdy chair, and finally to the floor. Scapular exercise: Retraction    1. Put the band around a solid object at about waist level. (A bedpost will work well.) Each hand should hold an end of the band. 2. With your elbows at your sides and bent to 90 degrees, pull the band back. Your shoulder blades should move toward each other. Then move your arms back where you started. 3. Repeat 8 to 12 times. 4. If you have good range of motion in your shoulders, try this exercise with your arms lifted out to the sides. Keep your elbows at a 90-degree angle. Raise the elastic band up to about shoulder level. Pull the band back to move your shoulder blades toward each other. Then move your arms back where you started. Internal rotator strengthening exercise    1. Start by tying a piece of elastic exercise material to a doorknob. You can use surgical tubing or Thera-Band. 2. Stand or sit with your shoulder relaxed and your elbow bent 90 degrees. Your upper arm should rest comfortably against your side. Squeeze a rolled towel between your elbow and your body for comfort. This will help keep your arm at your side.   3. Hold one end of the elastic band in the hand of the painful arm. 4. Slowly rotate your forearm toward your body until it touches your belly. Slowly move it back to where you started. 5. Keep your elbow and upper arm firmly tucked against the towel roll or at your side. 6. Repeat 8 to 12 times. External rotator strengthening exercise    1. Start by tying a piece of elastic exercise material to a doorknob. You can use surgical tubing or Thera-Band. (You may also hold one end of the band in each hand.)  2. Stand or sit with your shoulder relaxed and your elbow bent 90 degrees. Your upper arm should rest comfortably against your side. Squeeze a rolled towel between your elbow and your body for comfort. This will help keep your arm at your side. 3. Hold one end of the elastic band with the hand of the painful arm. 4. Start with your forearm across your belly. Slowly rotate the forearm out away from your body. Keep your elbow and upper arm tucked against the towel roll or the side of your body until you begin to feel tightness in your shoulder. Slowly move your arm back to where you started. 5. Repeat 8 to 12 times. Follow-up care is a key part of your treatment and safety. Be sure to make and go to all appointments, and call your doctor if you are having problems. It's also a good idea to know your test results and keep a list of the medicines you take. Where can you learn more? Go to http://sanjuana-lauren.info/. Enter Beatrice Lopez in the search box to learn more about \"Rotator Cuff: Exercises. \"  Current as of: September 20, 2018  Content Version: 11.9  © 1245-7445 [employer]. Care instructions adapted under license by [employer] (which disclaims liability or warranty for this information).  If you have questions about a medical condition or this instruction, always ask your healthcare professional. [place] [address] any warranty or liability for your use of this information.

## 2019-07-23 ENCOUNTER — OFFICE VISIT (OUTPATIENT)
Dept: ORTHOPEDIC SURGERY | Age: 84
End: 2019-07-23

## 2019-07-23 VITALS
WEIGHT: 123 LBS | DIASTOLIC BLOOD PRESSURE: 62 MMHG | BODY MASS INDEX: 20.49 KG/M2 | HEART RATE: 84 BPM | SYSTOLIC BLOOD PRESSURE: 124 MMHG | RESPIRATION RATE: 16 BRPM | TEMPERATURE: 98.4 F | HEIGHT: 65 IN

## 2019-07-23 DIAGNOSIS — M75.21 BICEPS TENDINITIS OF BOTH SHOULDERS: ICD-10-CM

## 2019-07-23 DIAGNOSIS — M75.22 BICEPS TENDINITIS OF BOTH SHOULDERS: ICD-10-CM

## 2019-07-23 DIAGNOSIS — M75.42 SHOULDER IMPINGEMENT, LEFT: Primary | ICD-10-CM

## 2019-07-23 DIAGNOSIS — M75.41 SHOULDER IMPINGEMENT, RIGHT: ICD-10-CM

## 2019-07-23 RX ORDER — BETAMETHASONE SODIUM PHOSPHATE AND BETAMETHASONE ACETATE 3; 3 MG/ML; MG/ML
6 INJECTION, SUSPENSION INTRA-ARTICULAR; INTRALESIONAL; INTRAMUSCULAR; SOFT TISSUE ONCE
Qty: 1 ML | Refills: 0
Start: 2019-07-23 | End: 2019-07-23

## 2019-07-23 NOTE — PROCEDURES
PROCEDURE NOTE:  Time out: 242pm  * Patient was identified by name and date of birth   * Agreement on procedure being performed was verified  * Risks and Benefits explained to the patient  * Procedure site verified and marked as necessary  * Patient was positioned for comfort  * Consent was signed and verified. Risks/benefits including but not limited to bleeding, infection, and scarring discussed and Pt wishes to proceed with procedure. The area was prepped with betadine. Ethyl chloride spray was used, under sterile technique and without ultrasound guidance 1cc of 6mg/cc celestone and 3cc lidocaine were injected into left subacromial space. Sterile gauze used to clean the area. Blood loss minimal.  Noticed improvement in pain Sx within 5 minutes (now rated 1/10). Tolerated procedure well. Discussed possible signs/Sx of infxn, and advised to seek care if concerned.

## 2019-07-23 NOTE — PATIENT INSTRUCTIONS
Wall Walk Side                       Wall Walk Front  Standing with the wall on your bad          Stand facing the wall, place forearm on  side place forearm on the wall. the wall. Walk your arm up the wall as   Walk your arm up the wall as shown. shown in picture. Pull bad shoulder up behind back   with good arm. Hold 5 seconds. Repeat each 15 Times  Perform 2 Time(s) a Day  Warming up with heating pad or doing these in  hot shower makes it much easier. BICEPS STRETCH    Place your hand against a wall or pole with  your elbow straight. Rotate your body away  from your hand/the wall until you feel a  stretch across the front of your chest and/or  down your arm into your bicep.     Hold 30 Seconds  Complete 2-4 Sets  Perform 2-5 Time(s) a Day

## 2019-07-23 NOTE — PROGRESS NOTES
HISTORY OF PRESENT ILLNESS    Mao German is a 80y.o. year old female comes in today as new patient for: shoulders B/L    Patients symptoms have been present for several months. Pain level 10 - Worst pain ever/10 mid-upper arms. It has worsened with lifting to sides. Patient has tried:   injected into both shoulders w/o benefit about a year ago. It is described as pain. Had been to Dr. Nancy Sanchez and given prednisone w/o benefit. Tylenol helps some    Past Surgical History:   Procedure Laterality Date    BREAST SURGERY PROCEDURE UNLISTED      both breasts 3 times each    CT ABD PELV W CONT      surgery for diverticulitis    HX APPENDECTOMY      HX BACK SURGERY      HX CHOLECYSTECTOMY      HX HEART CATHETERIZATION  10/16/90    HX ORTHOPAEDIC  0510-2093    5 back surgeries    HX ORTHOPAEDIC  3/06    left rotator cuff repair     Social History     Socioeconomic History    Marital status:      Spouse name: Not on file    Number of children: Not on file    Years of education: Not on file    Highest education level: Not on file   Tobacco Use    Smoking status: Former Smoker     Packs/day: 1.00     Years: 6.00     Pack years: 6.00     Last attempt to quit: 1971     Years since quittin.6    Smokeless tobacco: Never Used   Substance and Sexual Activity    Alcohol use: Yes     Alcohol/week: 2.5 standard drinks     Types: 3 Glasses of wine per week    Drug use: No      Current Outpatient Medications   Medication Sig Dispense Refill    solifenacin (VESICARE) 10 mg tablet Take 1 Tab by mouth daily. 90 Tab 3    nitroglycerin (NITROSTAT) 0.4 mg SL tablet 1 Tab by SubLINGual route every five (5) minutes as needed. 25 Tab 3    aspirin delayed-release 81 mg tablet Take 1 Tab by mouth daily. 30 Tab 1    cholecalciferol, vitamin D3, (VITAMIN D-3) 2,000 unit Tab Take 1 Tab by mouth daily.       amoxicillin (AMOXIL) 875 mg tablet       benzonatate (TESSALON) 100 mg capsule       colestipol (COLESTID) 1 gram tablet   0    mirabegron ER (MYRBETRIQ) 50 mg ER tablet Take 1 Tab by mouth daily. 90 Tab 3    levoFLOXacin (LEVAQUIN) 500 mg tablet Take 1 Tab by mouth daily. 10 Tab 0    traMADol (ULTRAM) 50 mg tablet Take 1 Tab by mouth every six (6) hours as needed for Pain. Max Daily Amount: 200 mg. 6 Tab 0    mirabegron (MYRBETRIQ) 25 mg tablet Take 1 Tab by mouth daily. 60 Tab 1    omeprazole (PRILOSEC) 20 mg capsule Take 20 mg by mouth daily.  memantine (NAMENDA) 10 mg tablet Take 10 mg by mouth daily.  BETA-CAROTENE,A, W-C & E/MIN (ICAPS PO) Take 1 Tab by mouth daily.  meloxicam (MOBIC) 15 mg tablet Take 15 mg by mouth daily.  MULTIVITAMIN PO Take 1 Tab by mouth daily.  metoprolol-XL (TOPROL XL) 50 mg XL tablet Take 50 mg by mouth daily.  donepezil (ARICEPT) 10 mg tablet Take 10 mg by mouth nightly.  meclizine (ANTIVERT) 25 mg tablet Take 25 mg by mouth as needed.  risedronate (ACTONEL) 35 mg tablet Take 35 mg by mouth every Sunday. Past Medical History:   Diagnosis Date    Atypical angina ([facility] [address]     Bulging disc 9/21/2010    Cardiac catheterization 10/16/1990    Patent coronary arteries. Normal LV function.  Cardiac echocardiogram 10/02/2012    EF 60%. No WMA. Mild-mod LAE.  Cardiac nuclear imaging test, low risk 10/02/2012    No ischemia or prior infarction. EF 77%. No WMA. Low risk pharm stress test.  Profound vagal response to Lexiscan.  Carotid duplex 01/25/2006    No significant stenosis bilaterally.     Dizziness     Dyspnea     Fecal incontinence     Follow-up exam 9/21/2010    Hematuria     Hepatitis B     Hip pain 4/13/2010    Hypertension     Mixed stress and urge urinary incontinence     Overactive bladder     Spinal stenosis      Family History   Problem Relation Age of Onset    Other Mother         hx of heart disorder    Hypertension Mother     Stroke Mother     Other Father         hx of heart disorder  Hypertension Father     Diabetes Father     Cancer Other          ROS:  No numb, swell, tingle  All other systems reviewed and negative aside from that written in the HPI. Objective:  /62   Pulse 84   Temp 98.4 °F (36.9 °C)   Resp 16   Ht 5' 5\" (1.651 m)   Wt 123 lb (55.8 kg)   BMI 20.47 kg/m²   GEN:  Appears stated age in NAD. HEAD:  Normocephalic, Atraumatic. NEURO:  Sensation intact light touch B/L upper extremities. right hand dominant. DTRs normal biceps and triceps   M/S:  Shoulder ROM Normal  bilaterally. Spurling's negative bilaterally  bilateral Shoulder:  Empty can positive External rotation negative. Internal rotation negative. [place] negative. SLAP negative. Load and Shift +1 Anterior, 1 Posterior. Strength +5/5 bilaterally upper extremities. Crossover test negative. Negative atrophy bilaterally. Negative TTP at [facility] joint. Apprehension test negative. Castro-Dheeraj Test positive. Yergason's test negative. Speed's test negative. EXT no Clubbing/cyanosis. no edema. SKIN: Warm, dry w/o rash. HEENT: Conjunctiva/lids WNL. External canals/nares WNL. Tongue midline. PERRL, EOMI. Hearing intact. NECK: Trachea midline. Supple, Full ROM. No thyromegaly. CARDIAC: No edema. LUNGS: Normal effort. ABD: Soft, no masses. No HSM. PSYCH: A+O x3. Appropriate judgment and insight. Assessment/Plan:     ICD-10-CM ICD-9-CM    1. Shoulder impingement, left M75.42 726.2 DRAIN/INJECT LARGE JOINT/BURSA   2. Shoulder impingement, right M75.41 726.2    3. Biceps tendinitis of both shoulders M75.21 726.12     M75.22         Patient (or guardian if minor) verbalizes understanding of evaluation and plan. Will work on ROM and stretch bicep as demo after inject left subacromial today and RTC 3-4 weeks for right side. Time with Pt 48 minutes, >50% of which was counseling pt regarding Dx and Tx options and coordination of care.

## 2019-07-23 NOTE — LETTER
NAME: Fallon Aviles : 1930 MRN: [id number] CONSENT FOR TREATMENT/PROCEDURE I authorize Lazara Lopez DO at [facility] to perform the medical treatment and/or procedure(s) described below:  
 
Description of Medical Treatment and/or Procedure(s): (Specify number of treatments or procedures if repeated treatment is recommended) 
 
_____________inject steroid into left shoulder subacromial_________________________ I understand my provider may be assisted with significant procedural tasks by other qualified medical practitioners, who may perform important parts of the treatment/procedure(s) or assist with the administration of analgesia (pain-relieving medications) as necessary for my treatment/procedure(s). These practitioners will only perform tasks within the scope of their licensure and practice, as determined under [place] law and any other applicable regulation(s). Name and Credentials of Practitioners Who May Assist with My Treatment/Procedure(s):  
 
______________________________________________________________________ I understand that a medical company representative may be present during my treatment/procedure(s) to observe and provide verbal, technical advice to my provider. I consent to the participation of this representative in my treatment/procedure(s). My provider has explained that unforeseen conditions may be identified during the performance of my treatment/procedure(s) that necessitate an extension of the original treatment/procedure(s) or the performance of procedures other than those identified above. I consent to the performance of additional treatment/procedure(s) by my provider and the qualified medical practitioners assisting my provider as deemed necessary by my provider for treatment of my medical condition(s).   
 
I understand that certain complications may arise during the use of analgesia during my treatment/procedure(s) that may include, but are not limited to, decreased/altered awareness, respiratory problems, drug reactions, paralysis, brain damage, or possibly, death. I understand that the analgesia required for the performance of my treatment/procedure(s) involves additional risks beyond those of the treatment/procedure(s) to be performed, and authorize the use of analgesia by my provider as deemed necessary for the control of pain during my treatment/procedure(s). I understand that my provider may need to change the type of analgesia or medications used, possibly without explanation to me, and I consent to any such changes as deemed necessary by my provider for treatment of my medical condition(s). I understand that there are risks of infection and other unexpected complications associated with any medical or surgical treatment/procedure including the treatment/procedure(s) listed above or other treatment/procedure(s) necessitated by my medical condition, and that such complications may occur in the absence of any negligence on the part of my healthcare providers. My provider has explained to my satisfaction my medical condition and the specific treatment/procedure(s) recommended and identified above. I have been given an opportunity to ask and have answered to my satisfaction questions about: (CONTINUED PAGE 2) NAME: Jennifer Ramirez : 1930 MRN: [id number]  The nature and extent of the treatment/procedure(s) to be performed;  The benefit of treatment, and the risks associated with not having the recommended treatment/procedure(s);  The risks and possible complications associated with having the treatment, including those which, even though unlikely to occur, may involve serious consequences;  
 Analgesia and alternative forms of analgesia;  Alternative procedures and methods of treatment, and the risk associated with each;  
  The expected consequences of the treatment/procedure(s) on my future health. I understand that no assurance can be given that the treatment/procedure(s) performed will be a success, and no guarantee or warranty of success for the treatment/procedure(s) has been given to me by my provider. I consent to the disposal by the examining Pathologist of any tissue removed during my treatment/procedure(s) in accordance with the receiving hospitals or laboratorys policy and any associated regulations specific to such disposal.  
 
I DO_____  DO NOT__x__ consent to other health care personnel observing my treatment/procedure(s) for the purpose of medical education or other specified purposes as may be explained by my provider. I DO_______ DO NOT__x__ consent to photography or videotaping of all or any part of my treatment/procedure(s) for medical and/or educational purposes. I understand that my identity will not be revealed in any photographs, videos, or accompanying explanations should these images be used by my healthcare providers. I certify that I have read and fully understand the above Consent for Treatment/Procedure and that all blanks were completed before I signed this consent.  
 
__________Debbie Debby Moscoso Wainwright_________                      _______________ Print Name of Patient or Legal Representative        Relationship to Patient (if not self) X_______________________________            __________________________ Signature of Patient (or legal representative)  Witness to signature    
 
                       __7/23/2019___/_________AM/PM 
                                                                   Date/Time (If patient is a minor or is unable to sign, complete the following) Patient is a minor, ________ years of age, or is unable to sign due to:______________________ I have explained the nature, purpose and anticipated benefits as well as any possible alternative methods of treatment, the known risks that are involved, and the possibility of complications of the proposed procedure(s) to the patient or patients legal representative. I have provided the patient or legal representative with an opportunity to ask and have answered to their satisfaction any questions about the proposed treatment/procedure(s) and alternative methods of treatment.   
 
Provider Signature:___________________  Date:__7/23/2019__Time:_____________AM/PM

## 2019-07-23 NOTE — PROGRESS NOTES
AVS reviewed: YES  HEP: AT demonstrated  Resources Provided: YES Printed Photos  Patient questions/concerns answered: NO. Pt denied questions/concerns  Patient verbalized understanding of treatment plan: YES

## 2019-08-09 ENCOUNTER — OFFICE VISIT (OUTPATIENT)
Dept: ORTHOPEDIC SURGERY | Age: 84
End: 2019-08-09

## 2019-08-09 VITALS
TEMPERATURE: 97.1 F | BODY MASS INDEX: 20.33 KG/M2 | DIASTOLIC BLOOD PRESSURE: 68 MMHG | WEIGHT: 122 LBS | HEART RATE: 69 BPM | HEIGHT: 65 IN | RESPIRATION RATE: 15 BRPM | SYSTOLIC BLOOD PRESSURE: 134 MMHG

## 2019-08-09 DIAGNOSIS — M75.41 SHOULDER IMPINGEMENT, RIGHT: ICD-10-CM

## 2019-08-09 DIAGNOSIS — M76.32 IT BAND SYNDROME, LEFT: Primary | ICD-10-CM

## 2019-08-09 DIAGNOSIS — M75.42 SHOULDER IMPINGEMENT, LEFT: ICD-10-CM

## 2019-08-09 RX ORDER — PREDNISONE 10 MG/1
TABLET ORAL
Qty: 42 TAB | Refills: 0 | Status: SHIPPED | OUTPATIENT
Start: 2019-08-09 | End: 2022-02-09

## 2019-08-09 NOTE — PATIENT INSTRUCTIONS
Wall Walk Side                       Wall Walk Front  Standing with the wall on your bad          Stand facing the wall, place forearm on  side place forearm on the wall. the wall. Walk your arm up the wall as   Walk your arm up the wall as shown. shown in picture. Pull bad shoulder up behind back   with good arm. Hold 5 seconds. Repeat each 15 Times  Perform 2 Time(s) a Day  Warming up with heating pad or doing these in  hot shower makes it much easier. IT BAND STRETCHES    Abloomy   --> Dr. Rosales Escalera    --> IT Band    For each stretch:  Repeat 1 Time  Hold 30 Seconds  Complete 4 Sets  Perform 5 Time(s) a Day    STANDING IT BAND STRETCH - SUPPORTED    In a standing position, cross the affected leg behind your unaffected leg. Next, lean forward and towards the unaffected side while using your arm for  balance support. STANDING IT BAND STRETCH WITH TRUNK SIDE BEND    In a standing position, cross the affected leg behind your unaffected leg. Next, with your arm over head, lean to the side towards the unaffected leg. IT BAND STRETCH WITH STRAP    Loop a belt/towel around your foot. While lying on your back and leg up in front of you and knee straight, bring your leg across midline for a gentle stretch felt along your outer thigh.

## 2019-08-09 NOTE — PROGRESS NOTES
HISTORY OF PRESENT ILLNESS    Kenji Boateng is a 80y.o. year old female comes in today to be evaluated and treated for: left hip, B/L shoulder pain    Since last appt has noticed shoulder left improved after injected but no knot left hip lateral. Pain level 10 - Worst pain ever/10. No trauma. No ROM for shoulder as instructed. Past Surgical History:   Procedure Laterality Date    BREAST SURGERY PROCEDURE UNLISTED      both breasts 3 times each    CT ABD PELV W CONT      surgery for diverticulitis    HX APPENDECTOMY      HX BACK SURGERY      HX CHOLECYSTECTOMY      HX HEART CATHETERIZATION  10/16/90    HX ORTHOPAEDIC  3281-4282    5 back surgeries    HX ORTHOPAEDIC  3/06    left rotator cuff repair     Social History     Socioeconomic History    Marital status:      Spouse name: Not on file    Number of children: Not on file    Years of education: Not on file    Highest education level: Not on file   Tobacco Use    Smoking status: Former Smoker     Packs/day: 1.00     Years: 6.00     Pack years: 6.00     Last attempt to quit: 1971     Years since quittin.6    Smokeless tobacco: Never Used   Substance and Sexual Activity    Alcohol use: Yes     Alcohol/week: 2.5 standard drinks     Types: 3 Glasses of wine per week    Drug use: No     Current Outpatient Medications   Medication Sig Dispense Refill    solifenacin (VESICARE) 10 mg tablet Take 1 Tab by mouth daily. 90 Tab 3    amoxicillin (AMOXIL) 875 mg tablet       benzonatate (TESSALON) 100 mg capsule       colestipol (COLESTID) 1 gram tablet   0    mirabegron ER (MYRBETRIQ) 50 mg ER tablet Take 1 Tab by mouth daily. 90 Tab 3    levoFLOXacin (LEVAQUIN) 500 mg tablet Take 1 Tab by mouth daily. 10 Tab 0    nitroglycerin (NITROSTAT) 0.4 mg SL tablet 1 Tab by SubLINGual route every five (5) minutes as needed. 25 Tab 3    traMADol (ULTRAM) 50 mg tablet Take 1 Tab by mouth every six (6) hours as needed for Pain.  Max Daily Amount: 200 mg. 6 Tab 0    mirabegron (MYRBETRIQ) 25 mg tablet Take 1 Tab by mouth daily. 60 Tab 1    aspirin delayed-release 81 mg tablet Take 1 Tab by mouth daily. 30 Tab 1    omeprazole (PRILOSEC) 20 mg capsule Take 20 mg by mouth daily.  memantine (NAMENDA) 10 mg tablet Take 10 mg by mouth daily.  BETA-CAROTENE,A, W-C & E/MIN (ICAPS PO) Take 1 Tab by mouth daily.  cholecalciferol, vitamin D3, (VITAMIN D-3) 2,000 unit Tab Take 1 Tab by mouth daily.  meloxicam (MOBIC) 15 mg tablet Take 15 mg by mouth daily.  MULTIVITAMIN PO Take 1 Tab by mouth daily.  metoprolol-XL (TOPROL XL) 50 mg XL tablet Take 50 mg by mouth daily.  donepezil (ARICEPT) 10 mg tablet Take 10 mg by mouth nightly.  meclizine (ANTIVERT) 25 mg tablet Take 25 mg by mouth as needed.  risedronate (ACTONEL) 35 mg tablet Take 35 mg by mouth every Sunday. Past Medical History:   Diagnosis Date    Atypical angina ([facility] [address]     Bulging disc 9/21/2010    Cardiac catheterization 10/16/1990    Patent coronary arteries. Normal LV function.  Cardiac echocardiogram 10/02/2012    EF 60%. No WMA. Mild-mod LAE.  Cardiac nuclear imaging test, low risk 10/02/2012    No ischemia or prior infarction. EF 77%. No WMA. Low risk pharm stress test.  Profound vagal response to Lexiscan.  Carotid duplex 01/25/2006    No significant stenosis bilaterally.     Dizziness     Dyspnea     Fecal incontinence     Follow-up exam 9/21/2010    Hematuria     Hepatitis B     Hip pain 4/13/2010    Hypertension     Mixed stress and urge urinary incontinence     Overactive bladder     Spinal stenosis      Family History   Problem Relation Age of Onset    Other Mother         hx of heart disorder    Hypertension Mother     Stroke Mother     Other Father         hx of heart disorder    Hypertension Father     Diabetes Father     Cancer Other          ROS:  No numb    Objective:  /68   Pulse 69   Temp 97.1 °F (36.2 °C)   Resp 15   Ht 5' 5\" (1.651 m)   Wt 122 lb (55.3 kg)   BMI 20.30 kg/m²   GEN:  Appears stated age in NAD. HEAD:  Normocephalic, Atraumatic. NEURO:  Sensation intact light touch B/L upper extremities. right hand dominant. DTRs normal biceps and triceps   M/S:  Shoulder ROM Normal  bilaterally. Spurling's negative bilaterally  bilateral Shoulder:  Empty can positive External rotation negative. Internal rotation negative. [place] negative. SLAP negative. Load and Shift +1 Anterior, 1 Posterior. Strength +5/5 bilaterally upper extremities. Crossover test negative. Negative atrophy bilaterally. Negative TTP at [facility] joint. Apprehension test negative. Castro-Dheeraj Test positive. TTP lateral hip at trochanter w/ some swelling. Yergason's test negative. Speed's test negative. EXT no Clubbing/cyanosis. no edema. SKIN: Warm, dry w/o rash. Assessment/Plan:     ICD-10-CM ICD-9-CM    1. It band syndrome, left M76.32 728.89 predniSONE (DELTASONE) 10 mg tablet   2. Shoulder impingement, left M75.42 726.2 predniSONE (DELTASONE) 10 mg tablet   3. Shoulder impingement, right M75.41 726.2 predniSONE (DELTASONE) 10 mg tablet     Patient (or guardian if minor) verbalizes understanding of evaluation and plan. Discussed no ROM/HEP so shoudlers not improved. Will use prednisone and demo ROM shoulders with stretch for IT band and RTC 6 weeks.

## 2021-06-30 ENCOUNTER — TRANSCRIBE ORDER (OUTPATIENT)
Dept: REGISTRATION | Age: 86
End: 2021-06-30

## 2021-06-30 ENCOUNTER — HOSPITAL ENCOUNTER (OUTPATIENT)
Dept: LAB | Age: 86
Discharge: HOME | End: 2021-06-30
Payer: MEDICARE

## 2021-06-30 DIAGNOSIS — E55.9 AVITAMINOSIS D: ICD-10-CM

## 2021-06-30 DIAGNOSIS — E55.9 AVITAMINOSIS D: Primary | ICD-10-CM

## 2021-06-30 DIAGNOSIS — F03.90 SENILE DEMENTIA, UNCOMPLICATED (HCC): ICD-10-CM

## 2021-06-30 LAB
25(OH)D3 SERPL-MCNC: 18.2 NG/ML (ref 30–100)
ALBUMIN SERPL-MCNC: 3.3 G/DL (ref 3.4–5)
ALBUMIN/GLOB SERPL: 1.1 {RATIO} (ref 0.8–1.7)
ALP SERPL-CCNC: 56 U/L (ref 45–117)
ALT SERPL-CCNC: 13 U/L (ref 13–56)
ANION GAP SERPL CALC-SCNC: 4 MMOL/L (ref 3–18)
AST SERPL-CCNC: 11 U/L (ref 10–38)
BASOPHILS # BLD: 0.1 K/UL (ref 0–0.1)
BASOPHILS NFR BLD: 1 % (ref 0–2)
BILIRUB SERPL-MCNC: 0.4 MG/DL (ref 0.2–1)
BUN SERPL-MCNC: 10 MG/DL (ref 7–18)
BUN/CREAT SERPL: 16 (ref 12–20)
CALCIUM SERPL-MCNC: 8.7 MG/DL (ref 8.5–10.1)
CHLORIDE SERPL-SCNC: 106 MMOL/L (ref 100–111)
CO2 SERPL-SCNC: 30 MMOL/L (ref 21–32)
CREAT SERPL-MCNC: 0.61 MG/DL (ref 0.6–1.3)
DIFFERENTIAL METHOD BLD: ABNORMAL
EOSINOPHIL # BLD: 0.1 K/UL (ref 0–0.4)
EOSINOPHIL NFR BLD: 1 % (ref 0–5)
ERYTHROCYTE [DISTWIDTH] IN BLOOD BY AUTOMATED COUNT: 14.4 % (ref 11.6–14.5)
GLOBULIN SER CALC-MCNC: 3 G/DL (ref 2–4)
GLUCOSE SERPL-MCNC: 114 MG/DL (ref 74–99)
HCT VFR BLD AUTO: 36.7 % (ref 35–45)
HGB BLD-MCNC: 11.6 G/DL (ref 12–16)
LYMPHOCYTES # BLD: 2.1 K/UL (ref 0.9–3.6)
LYMPHOCYTES NFR BLD: 27 % (ref 21–52)
MCH RBC QN AUTO: 33.6 PG (ref 24–34)
MCHC RBC AUTO-ENTMCNC: 31.6 G/DL (ref 31–37)
MCV RBC AUTO: 106.4 FL (ref 74–97)
MONOCYTES # BLD: 0.6 K/UL (ref 0.05–1.2)
MONOCYTES NFR BLD: 7 % (ref 3–10)
NEUTS SEG # BLD: 5.1 K/UL (ref 1.8–8)
NEUTS SEG NFR BLD: 64 % (ref 40–73)
PLATELET # BLD AUTO: 576 K/UL (ref 135–420)
PMV BLD AUTO: 9.1 FL (ref 9.2–11.8)
POTASSIUM SERPL-SCNC: 4.5 MMOL/L (ref 3.5–5.5)
PROT SERPL-MCNC: 6.3 G/DL (ref 6.4–8.2)
RBC # BLD AUTO: 3.45 M/UL (ref 4.2–5.3)
SODIUM SERPL-SCNC: 140 MMOL/L (ref 136–145)
TSH SERPL DL<=0.05 MIU/L-ACNC: 0.01 UIU/ML (ref 0.36–3.74)
WBC # BLD AUTO: 7.9 K/UL (ref 4.6–13.2)

## 2021-06-30 PROCEDURE — 85025 COMPLETE CBC W/AUTO DIFF WBC: CPT

## 2021-06-30 PROCEDURE — 84443 ASSAY THYROID STIM HORMONE: CPT

## 2021-06-30 PROCEDURE — 82306 VITAMIN D 25 HYDROXY: CPT

## 2021-06-30 PROCEDURE — 36415 COLL VENOUS BLD VENIPUNCTURE: CPT

## 2021-06-30 PROCEDURE — 80053 COMPREHEN METABOLIC PANEL: CPT

## 2021-11-16 ENCOUNTER — HOSPITAL ENCOUNTER (EMERGENCY)
Age: 86
Discharge: HOME | End: 2021-11-16
Attending: STUDENT IN AN ORGANIZED HEALTH CARE EDUCATION/TRAINING PROGRAM
Payer: MEDICARE

## 2021-11-16 ENCOUNTER — APPOINTMENT (OUTPATIENT)
Dept: CT IMAGING | Age: 86
End: 2021-11-16
Attending: STUDENT IN AN ORGANIZED HEALTH CARE EDUCATION/TRAINING PROGRAM
Payer: MEDICARE

## 2021-11-16 ENCOUNTER — APPOINTMENT (OUTPATIENT)
Dept: GENERAL RADIOLOGY | Age: 86
End: 2021-11-16
Attending: STUDENT IN AN ORGANIZED HEALTH CARE EDUCATION/TRAINING PROGRAM
Payer: MEDICARE

## 2021-11-16 VITALS
RESPIRATION RATE: 20 BRPM | DIASTOLIC BLOOD PRESSURE: 69 MMHG | BODY MASS INDEX: 19.66 KG/M2 | HEART RATE: 102 BPM | WEIGHT: 118 LBS | HEIGHT: 65 IN | OXYGEN SATURATION: 97 % | SYSTOLIC BLOOD PRESSURE: 144 MMHG | TEMPERATURE: 98.1 F

## 2021-11-16 DIAGNOSIS — T14.8XXA CONTUSION OF BONE: ICD-10-CM

## 2021-11-16 DIAGNOSIS — S50.01XA CONTUSION OF RIGHT ELBOW, INITIAL ENCOUNTER: ICD-10-CM

## 2021-11-16 DIAGNOSIS — S02.401A MAXILLARY SINUS FRACTURE, CLOSED, INITIAL ENCOUNTER (HCC): ICD-10-CM
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
## 2021-11-16 DIAGNOSIS — S05.11XA CONTUSION OF RIGHT ORBITAL TISSUES, INITIAL ENCOUNTER: ICD-10-CM

## 2021-11-16 DIAGNOSIS — S02.2XXA CLOSED FRACTURE OF NASAL BONE, INITIAL ENCOUNTER: ICD-10-CM

## 2021-11-16 PROCEDURE — 70450 CT HEAD/BRAIN W/O DYE: CPT

## 2021-11-16 PROCEDURE — 99284 EMERGENCY DEPT VISIT MOD MDM: CPT

## 2021-11-16 PROCEDURE — 70486 CT MAXILLOFACIAL W/O DYE: CPT

## 2021-11-16 PROCEDURE — 73080 X-RAY EXAM OF ELBOW: CPT

## 2021-11-16 RX ORDER — OXYCODONE AND ACETAMINOPHEN 5; 325 MG/1; MG/1
1 TABLET ORAL
Qty: 12 TABLET | Refills: 0 | Status: SHIPPED | OUTPATIENT
Start: 2021-11-16 | End: 2021-11-19

## 2021-11-16 NOTE — ED NOTES
Additional comments. Nose bleed for 5 minutes after fall. Patient is perseverating on the sentence, \"I have no feeling in my face. \"  Repeats this every two to 5 minutes without prompt or question.

## 2021-11-17 NOTE — ED NOTES
19:00 Assumed care by Dr. Xochitl Kimble pending CT results  Patient feels well on reassessment  R facial bruising noted  EOMI intact, vision intact  No other neurological deficit  Acute mildly displaced fractures of the anterior and lateral walls of the right maxillary antrum  Mildly displaced R nasal bone fracture  Discussed with CT results Dr Karen Easley on call [facility]-to follow-up as outpatient  Patient to follow-up with ENT as well  Nasal bone fracture precautions given

## 2021-11-17 NOTE — ED PROVIDER NOTES
EMERGENCY DEPARTMENT HISTORY AND PHYSICAL EXAM      Date: 11/16/2021  Patient Name: Rachael Carter    History of Presenting Illness     Chief Complaint   Patient presents with   [facility] Fall    Head Injury       History (Context): Rachael Carter is a 80 y.o. female with a past medical history significant for CAD, hepatitis B, hypertension, spinal stenosis, comes into the ED today due to fall with subsequent facial trauma. Patient states that around 2 PM today she had a mechanical fall landing on the right elbow and subsequently hitting the right side of her face on the ground. Patient denies any loss of consciousness. She denies any anticoagulation usage. She states that symptoms have worsened since onset. Patient did not take any medication for treatment of her symptoms prior to arrival.  Patient does admit to numbness to the right side of the face but denies any diplopia. She does admit to mild tenderness to the right elbow with contusion but otherwise denies any further pain. She states symptoms are moderate in severity. PCP: Arnaldo Saldana MD    Current Outpatient Medications   Medication Sig Dispense Refill    predniSONE (DELTASONE) 10 mg tablet 3 tabs daily for 7 days, 2 tabs daily for 7 days, then 1 tab daily until gone. 42 Tab 0    solifenacin (VESICARE) 10 mg tablet Take 1 Tab by mouth daily. 90 Tab 3    amoxicillin (AMOXIL) 875 mg tablet       benzonatate (TESSALON) 100 mg capsule       colestipol (COLESTID) 1 gram tablet   0    mirabegron ER (MYRBETRIQ) 50 mg ER tablet Take 1 Tab by mouth daily. 90 Tab 3    levoFLOXacin (LEVAQUIN) 500 mg tablet Take 1 Tab by mouth daily. 10 Tab 0    nitroglycerin (NITROSTAT) 0.4 mg SL tablet 1 Tab by SubLINGual route every five (5) minutes as needed. 25 Tab 3    traMADol (ULTRAM) 50 mg tablet Take 1 Tab by mouth every six (6) hours as needed for Pain.  Max Daily Amount: 200 mg. 6 Tab 0    mirabegron (MYRBETRIQ) 25 mg tablet Take 1 Tab by mouth daily. 60 Tab 1    aspirin delayed-release 81 mg tablet Take 1 Tab by mouth daily. 30 Tab 1    omeprazole (PRILOSEC) 20 mg capsule Take 20 mg by mouth daily.  memantine (NAMENDA) 10 mg tablet Take 10 mg by mouth daily.  BETA-CAROTENE,A, W-C & E/MIN (ICAPS PO) Take 1 Tab by mouth daily.  cholecalciferol, vitamin D3, (VITAMIN D-3) 2,000 unit Tab Take 1 Tab by mouth daily.  meloxicam (MOBIC) 15 mg tablet Take 15 mg by mouth daily.  MULTIVITAMIN PO Take 1 Tab by mouth daily.  metoprolol-XL (TOPROL XL) 50 mg XL tablet Take 50 mg by mouth daily.  donepezil (ARICEPT) 10 mg tablet Take 10 mg by mouth nightly.  meclizine (ANTIVERT) 25 mg tablet Take 25 mg by mouth as needed.  risedronate (ACTONEL) 35 mg tablet Take 35 mg by mouth every Sunday. Past History     Past Medical History:   Past Medical History:   Diagnosis Date    Atypical angina ([facility] [address]     Bulging disc 9/21/2010    Cardiac catheterization 10/16/1990    Patent coronary arteries. Normal LV function.  Cardiac echocardiogram 10/02/2012    EF 60%. No WMA. Mild-mod LAE.  Cardiac nuclear imaging test, low risk 10/02/2012    No ischemia or prior infarction. EF 77%. No WMA. Low risk pharm stress test.  Profound vagal response to Lexiscan.  Carotid duplex 01/25/2006    No significant stenosis bilaterally.     Dizziness     Dyspnea     Fecal incontinence     Follow-up exam 9/21/2010    Hematuria     Hepatitis B     Hip pain 4/13/2010    Hypertension     Mixed stress and urge urinary incontinence     Overactive bladder     Spinal stenosis        Past Surgical History:  Past Surgical History:   Procedure Laterality Date    CT ABD PELV W CONT      surgery for diverticulitis    HX APPENDECTOMY      HX BACK SURGERY      HX CHOLECYSTECTOMY      HX HEART CATHETERIZATION  10/16/90    HX ORTHOPAEDIC  3023-0941    5 back surgeries    HX ORTHOPAEDIC  3/06    left rotator cuff repair  [place] BREAST SURGERY PROCEDURE UNLISTED      both breasts 3 times each       Family History:  Family History   Problem Relation Age of Onset    Other Mother         hx of heart disorder    Hypertension Mother     Stroke Mother     Other Father         hx of heart disorder    Hypertension Father     Diabetes Father     Cancer Other        Social History:   Social History     Tobacco Use    Smoking status: Former Smoker     Packs/day: 1.00     Years: 6.00     Pack years: 6.00     Quit date: 1971     Years since quittin.9    Smokeless tobacco: Never Used   Substance Use Topics    Alcohol use: Yes     Alcohol/week: 2.5 standard drinks     Types: 3 Glasses of wine per week    Drug use: No       Allergies: Allergies   Allergen Reactions    Iodinated Contrast Media Swelling     Swollen all over with welps       PMH, PSH, family history, social history, allergies reviewed with the patient with significant items noted above. Review of Systems   Review of Systems   Constitutional: Negative for chills and fever. HENT: Negative for sore throat. Eyes: Negative for visual disturbance. Respiratory: Negative for shortness of breath. Cardiovascular: Negative for chest pain. Gastrointestinal: Negative for abdominal pain, nausea and vomiting. Genitourinary: Negative for difficulty urinating. Musculoskeletal: Positive for joint swelling. Negative for myalgias. Right elbow pain   Skin: Negative for rash. Contusion to the right elbow   Neurological: Positive for numbness. Negative for headaches. Right-sided facial numbness       Physical Exam     Vitals:    21 1759   BP: (!) 144/69   Pulse: (!) 102   Resp: 20   Temp: 98.1 °F (36.7 °C)   SpO2: 97%   Weight: 53.5 kg (118 lb)   Height: 5' 5\" (1.651 m)       Physical Exam  Vitals and nursing note reviewed. Constitutional:       General: She is not in acute distress. Appearance: Normal appearance.  She is not ill-appearing or toxic-appearing. HENT:      Head: Normocephalic and atraumatic. Mouth/Throat:      Mouth: Mucous membranes are moist.   Eyes:      General: No scleral icterus. Extraocular Movements: Extraocular movements intact. Conjunctiva/sclera: Conjunctivae normal.      Pupils: Pupils are equal, round, and reactive to light. Cardiovascular:      Rate and Rhythm: Normal rate and regular rhythm. Comments: Normal peripheral perfusion  Pulmonary:      Effort: Pulmonary effort is normal. No respiratory distress. Abdominal:      General: There is no distension. Palpations: Abdomen is soft. Tenderness: There is no abdominal tenderness. Musculoskeletal:         General: Swelling (Mild swelling to the right elbow) and tenderness (Tenderness to palpation to the right zygomatic arch) present. No deformity or signs of injury. Normal range of motion. Cervical back: Normal range of motion and neck supple. Skin:     General: Skin is warm and dry. Findings: Bruising (To the right elbow and right inferior orbit) present. No rash. Neurological:      General: No focal deficit present. Mental Status: She is alert and oriented to person, place, and time. Mental status is at baseline. Cranial Nerves: Cranial nerve deficit (Subjective numbness to the right side of the face in the infraorbital nerve region) present. Sensory: No sensory deficit. Motor: No weakness. Psychiatric:         Mood and Affect: Mood normal.         Thought Content: Thought content normal.         Diagnostic Study Results     Labs -   No results found for this or any previous visit (from the past 12 hour(s)).  Labs Reviewed - No data to display    Radiologic Studies -   CT HEAD WO CONT    (Results Pending)   CT MAXILLOFACIAL WO CONT    (Results Pending)   XR ELBOW RT MIN 3 V    (Results Pending)     CT Results  (Last 48 hours)    None        CXR Results  (Last 48 hours)    None          The laboratory results, imaging results, and other diagnostic exams were reviewed in the EMR. Medical Decision Making   I am the first provider for this patient. I reviewed the vital signs, available nursing notes, past medical history, past surgical history, family history and social history. Vital Signs-Reviewed the patient's vital signs. Records Reviewed: Personally, on initial evaluation    MDM:   Cathleen Ervin presents with complaint of fall  DDX includes but is not limited to: Closed fracture, intracranial hemorrhage, orbital fracture. Over well-appearing, no distress, vital signs grossly within normal limits. Will obtain imaging for further evaluation of patients complaint. Will continue to monitor and evaluate patient while in the ED. Orders as below:  Orders Placed This Encounter    CT HEAD WO CONT    CT MAXILLOFACIAL WO CONT    XR ELBOW RT MIN 3 V        ED Course:   ED Course as of 11/16/21 2004 Tue Nov 16, 2021 2002 Patient's x-ray of the elbow does not show any acute fracture dislocation. We will continue to monitor patient while awaiting CT scans for final disposition. [DV]      ED Course User Index  [DV] Martin Day DO           Procedures:  Procedures        Diagnosis and Disposition     CLINICAL IMPRESSION:  No diagnosis found. Current Discharge Medication List          Disposition: TBD      8:09 PM : Pt care transferred to Dr. Lisa Collins  ,ED provider. History of patient complaint(s), available diagnostic reports and current treatment plan has been discussed thoroughly. Bedside rounding on patient occured : no . Intended disposition of patient : TBD  Pending diagnostics reports and/or labs (please list):  CT scans    Dr. Jes Bueno assistance in completion of this plan is greatly appreciated but it should be noted that I will be the provider of record for this patient.         Dee Disclaimer     Please note that this dictation was completed with fanny [employer] computer voice recognition software. Quite often unanticipated grammatical, syntax, homophones, and other interpretive errors are inadvertently transcribed by the computer software. Please disregard these errors. Please excuse any errors that have escaped final proofreading. Caitlyn SHEPPARD.

## 2022-02-09 ENCOUNTER — HOSPITAL ENCOUNTER (INPATIENT)
Age: 87
LOS: 4 days | Discharge: HOME HEALTH | DRG: 158 | End: 2022-02-13
Attending: EMERGENCY MEDICINE | Admitting: HOSPITALIST
Payer: MEDICARE

## 2022-02-09 ENCOUNTER — APPOINTMENT (OUTPATIENT)
Dept: MRI IMAGING | Age: 87
DRG: 158 | End: 2022-02-09
Attending: NURSE PRACTITIONER
Payer: MEDICARE

## 2022-02-09 ENCOUNTER — APPOINTMENT (OUTPATIENT)
Dept: CT IMAGING | Age: 87
DRG: 158 | End: 2022-02-09
Attending: EMERGENCY MEDICINE
Payer: MEDICARE

## 2022-02-09 ENCOUNTER — APPOINTMENT (OUTPATIENT)
Dept: GENERAL RADIOLOGY | Age: 87
DRG: 158 | End: 2022-02-09
Attending: EMERGENCY MEDICINE
Payer: MEDICARE

## 2022-02-09 DIAGNOSIS — Z71.89 GOALS OF CARE, COUNSELING/DISCUSSION: ICD-10-CM

## 2022-02-09 DIAGNOSIS — I10 PRIMARY HYPERTENSION: Chronic | ICD-10-CM

## 2022-02-09 DIAGNOSIS — R47.1 DYSARTHRIA: ICD-10-CM

## 2022-02-09 DIAGNOSIS — F02.80 ALZHEIMER'S DEMENTIA OF OTHER ONSET WITHOUT BEHAVIORAL DISTURBANCE: Chronic | ICD-10-CM

## 2022-02-09 DIAGNOSIS — G30.8 ALZHEIMER'S DEMENTIA OF OTHER ONSET WITHOUT BEHAVIORAL DISTURBANCE: Chronic | ICD-10-CM

## 2022-02-09 DIAGNOSIS — F03.90 DEMENTIA WITHOUT BEHAVIORAL DISTURBANCE, UNSPECIFIED DEMENTIA TYPE: Chronic | ICD-10-CM

## 2022-02-09 DIAGNOSIS — R53.81 DEBILITY: ICD-10-CM

## 2022-02-09 DIAGNOSIS — Z51.5 ENCOUNTER FOR PALLIATIVE CARE: ICD-10-CM

## 2022-02-09 DIAGNOSIS — I48.91 ATRIAL FIBRILLATION, UNSPECIFIED TYPE (HCC): ICD-10-CM

## 2022-02-09 LAB
25(OH)D3 SERPL-MCNC: 16 NG/ML (ref 30–100)
ALBUMIN SERPL-MCNC: 1.8 G/DL (ref 3.4–5)
ALBUMIN/GLOB SERPL: 0.9 {RATIO} (ref 0.8–1.7)
ALP SERPL-CCNC: 33 U/L (ref 45–117)
ALT SERPL-CCNC: 9 U/L (ref 13–56)
ANION GAP SERPL CALC-SCNC: 6 MMOL/L (ref 3–18)
APPEARANCE UR: CLEAR
APTT PPP: 37.8 SEC (ref 23–36.4)
AST SERPL-CCNC: 18 U/L (ref 10–38)
ATRIAL RATE: 153 BPM
BACTERIA URNS QL MICRO: NEGATIVE /HPF
BASOPHILS # BLD: 0.1 K/UL (ref 0–0.1)
BASOPHILS NFR BLD: 1 % (ref 0–2)
BILIRUB SERPL-MCNC: 0.4 MG/DL (ref 0.2–1)
BILIRUB UR QL: NEGATIVE
BUN SERPL-MCNC: 10 MG/DL (ref 7–18)
BUN/CREAT SERPL: 56 (ref 12–20)
CA-I SERPL-SCNC: 1.01 MMOL/L (ref 1.15–1.33)
CALCIUM SERPL-MCNC: 5.2 MG/DL (ref 8.5–10.1)
CALCULATED R AXIS, ECG10: -17 DEGREES
CALCULATED T AXIS, ECG11: 68 DEGREES
CHLORIDE SERPL-SCNC: 124 MMOL/L (ref 100–111)
CO2 SERPL-SCNC: 19 MMOL/L (ref 21–32)
COLOR UR: YELLOW
CREAT SERPL-MCNC: 0.18 MG/DL (ref 0.6–1.3)
DIAGNOSIS, 93000: NORMAL
DIFFERENTIAL METHOD BLD: ABNORMAL
EOSINOPHIL # BLD: 0 K/UL (ref 0–0.4)
EOSINOPHIL NFR BLD: 0 % (ref 0–5)
EPITH CASTS URNS QL MICRO: NORMAL /LPF (ref 0–5)
ERYTHROCYTE [DISTWIDTH] IN BLOOD BY AUTOMATED COUNT: 14.6 % (ref 11.6–14.5)
FOLATE SERPL-MCNC: 11.4 NG/ML (ref 3.1–17.5)
GLOBULIN SER CALC-MCNC: 1.9 G/DL (ref 2–4)
GLUCOSE BLD STRIP.AUTO-MCNC: 121 MG/DL (ref 70–110)
GLUCOSE SERPL-MCNC: 94 MG/DL (ref 74–99)
GLUCOSE UR STRIP.AUTO-MCNC: NEGATIVE MG/DL
HCT VFR BLD AUTO: 36.8 % (ref 35–45)
HGB BLD-MCNC: 11.8 G/DL (ref 12–16)
HGB UR QL STRIP: ABNORMAL
IMM GRANULOCYTES # BLD AUTO: 0.1 K/UL (ref 0–0.04)
IMM GRANULOCYTES NFR BLD AUTO: 1 % (ref 0–0.5)
INR PPP: 1.1 (ref 0.8–1.2)
KETONES UR QL STRIP.AUTO: NEGATIVE MG/DL
LEUKOCYTE ESTERASE UR QL STRIP.AUTO: ABNORMAL
LYMPHOCYTES # BLD: 1.4 K/UL (ref 0.9–3.6)
LYMPHOCYTES NFR BLD: 9 % (ref 21–52)
MCH RBC QN AUTO: 34.4 PG (ref 24–34)
MCHC RBC AUTO-ENTMCNC: 32.1 G/DL (ref 31–37)
MCV RBC AUTO: 107.3 FL (ref 78–100)
MONOCYTES # BLD: 1.1 K/UL (ref 0.05–1.2)
MONOCYTES NFR BLD: 8 % (ref 3–10)
NEUTS SEG # BLD: 12 K/UL (ref 1.8–8)
NEUTS SEG NFR BLD: 82 % (ref 40–73)
NITRITE UR QL STRIP.AUTO: NEGATIVE
NRBC # BLD: 0 K/UL (ref 0–0.01)
NRBC BLD-RTO: 0 PER 100 WBC
PH UR STRIP: 7 [PH] (ref 5–8)
PHOSPHATE SERPL-MCNC: 2.6 MG/DL (ref 2.5–4.9)
PLATELET # BLD AUTO: 679 K/UL (ref 135–420)
PMV BLD AUTO: 9.2 FL (ref 9.2–11.8)
POTASSIUM SERPL-SCNC: 2.9 MMOL/L (ref 3.5–5.5)
PROCALCITONIN SERPL-MCNC: 0.07 NG/ML
PROT SERPL-MCNC: 3.7 G/DL (ref 6.4–8.2)
PROT UR STRIP-MCNC: ABNORMAL MG/DL
PROTHROMBIN TIME: 13.8 SEC (ref 11.5–15.2)
Q-T INTERVAL, ECG07: 292 MS
QRS DURATION, ECG06: 66 MS
QTC CALCULATION (BEZET), ECG08: 418 MS
RBC # BLD AUTO: 3.43 M/UL (ref 4.2–5.3)
RBC #/AREA URNS HPF: NORMAL /HPF (ref 0–5)
SODIUM SERPL-SCNC: 149 MMOL/L (ref 136–145)
SP GR UR REFRACTOMETRY: 1.02 (ref 1–1.03)
TROPONIN-HIGH SENSITIVITY: 10 NG/L (ref 0–54)
UROBILINOGEN UR QL STRIP.AUTO: 1 EU/DL (ref 0.2–1)
VENTRICULAR RATE, ECG03: 123 BPM
VIT B12 SERPL-MCNC: 247 PG/ML (ref 211–911)
WBC # BLD AUTO: 14.6 K/UL (ref 4.6–13.2)
WBC URNS QL MICRO: NORMAL /HPF (ref 0–4)

## 2022-02-09 PROCEDURE — 76450000000

## 2022-02-09 PROCEDURE — 82330 ASSAY OF CALCIUM: CPT

## 2022-02-09 PROCEDURE — 84484 ASSAY OF TROPONIN QUANT: CPT

## 2022-02-09 PROCEDURE — 81001 URINALYSIS AUTO W/SCOPE: CPT

## 2022-02-09 PROCEDURE — 36415 COLL VENOUS BLD VENIPUNCTURE: CPT

## 2022-02-09 PROCEDURE — 2709999900 HC NON-CHARGEABLE SUPPLY

## 2022-02-09 PROCEDURE — 82306 VITAMIN D 25 HYDROXY: CPT

## 2022-02-09 PROCEDURE — 70450 CT HEAD/BRAIN W/O DYE: CPT

## 2022-02-09 PROCEDURE — 74011000258 HC RX REV CODE- 258: Performed by: NURSE PRACTITIONER

## 2022-02-09 PROCEDURE — 71045 X-RAY EXAM CHEST 1 VIEW: CPT

## 2022-02-09 PROCEDURE — 84100 ASSAY OF PHOSPHORUS: CPT

## 2022-02-09 PROCEDURE — 85730 THROMBOPLASTIN TIME PARTIAL: CPT

## 2022-02-09 PROCEDURE — 85610 PROTHROMBIN TIME: CPT

## 2022-02-09 PROCEDURE — 82962 GLUCOSE BLOOD TEST: CPT

## 2022-02-09 PROCEDURE — 84145 PROCALCITONIN (PCT): CPT

## 2022-02-09 PROCEDURE — 93005 ELECTROCARDIOGRAM TRACING: CPT

## 2022-02-09 PROCEDURE — 65660000000 HC RM CCU STEPDOWN

## 2022-02-09 PROCEDURE — 74011250636 HC RX REV CODE- 250/636: Performed by: NURSE PRACTITIONER

## 2022-02-09 PROCEDURE — 83735 ASSAY OF MAGNESIUM: CPT

## 2022-02-09 PROCEDURE — 80053 COMPREHEN METABOLIC PANEL: CPT

## 2022-02-09 PROCEDURE — 70551 MRI BRAIN STEM W/O DYE: CPT

## 2022-02-09 PROCEDURE — 82607 VITAMIN B-12: CPT

## 2022-02-09 PROCEDURE — 99223 1ST HOSP IP/OBS HIGH 75: CPT | Performed by: HOSPITALIST

## 2022-02-09 PROCEDURE — APPSS60 APP SPLIT SHARED TIME 46-60 MINUTES: Performed by: NURSE PRACTITIONER

## 2022-02-09 PROCEDURE — 85025 COMPLETE CBC W/AUTO DIFF WBC: CPT

## 2022-02-09 PROCEDURE — 99285 EMERGENCY DEPT VISIT HI MDM: CPT

## 2022-02-09 RX ORDER — CALCIUM CARBONATE 200(500)MG
200 TABLET,CHEWABLE ORAL
Status: DISCONTINUED | OUTPATIENT
Start: 2022-02-10 | End: 2022-02-13

## 2022-02-09 RX ORDER — POTASSIUM CHLORIDE AND SODIUM CHLORIDE 900; 300 MG/100ML; MG/100ML
INJECTION, SOLUTION INTRAVENOUS CONTINUOUS
Status: DISPENSED | OUTPATIENT
Start: 2022-02-09 | End: 2022-02-10

## 2022-02-09 RX ORDER — TOLTERODINE 2 MG/1
2 CAPSULE, EXTENDED RELEASE ORAL DAILY
COMMUNITY

## 2022-02-09 RX ORDER — PANTOPRAZOLE SODIUM 20 MG/1
20 TABLET, DELAYED RELEASE ORAL
Status: DISCONTINUED | OUTPATIENT
Start: 2022-02-10 | End: 2022-02-13 | Stop reason: HOSPADM

## 2022-02-09 RX ORDER — ONDANSETRON 2 MG/ML
4 INJECTION INTRAMUSCULAR; INTRAVENOUS
Status: DISCONTINUED | OUTPATIENT
Start: 2022-02-09 | End: 2022-02-13 | Stop reason: HOSPADM

## 2022-02-09 RX ORDER — ENOXAPARIN SODIUM 100 MG/ML
40 INJECTION SUBCUTANEOUS EVERY 24 HOURS
Status: DISCONTINUED | OUTPATIENT
Start: 2022-02-09 | End: 2022-02-13 | Stop reason: HOSPADM

## 2022-02-09 RX ORDER — GUAIFENESIN 100 MG/5ML
81 LIQUID (ML) ORAL DAILY
Status: DISCONTINUED | OUTPATIENT
Start: 2022-02-10 | End: 2022-02-10

## 2022-02-09 RX ORDER — ACETAMINOPHEN 325 MG/1
650 TABLET ORAL
Status: DISCONTINUED | OUTPATIENT
Start: 2022-02-09 | End: 2022-02-13 | Stop reason: HOSPADM

## 2022-02-09 RX ORDER — LABETALOL HCL 20 MG/4 ML
5 SYRINGE (ML) INTRAVENOUS
Status: DISCONTINUED | OUTPATIENT
Start: 2022-02-09 | End: 2022-02-13 | Stop reason: HOSPADM

## 2022-02-09 RX ORDER — POTASSIUM CHLORIDE 7.45 MG/ML
10 INJECTION INTRAVENOUS
Status: COMPLETED | OUTPATIENT
Start: 2022-02-09 | End: 2022-02-10

## 2022-02-09 RX ORDER — ATORVASTATIN CALCIUM 40 MG/1
80 TABLET, FILM COATED ORAL
Status: DISCONTINUED | OUTPATIENT
Start: 2022-02-09 | End: 2022-02-10

## 2022-02-09 RX ADMIN — CALCIUM GLUCONATE 2 G: 98 INJECTION, SOLUTION INTRAVENOUS at 23:15

## 2022-02-09 RX ADMIN — ENOXAPARIN SODIUM 40 MG: 100 INJECTION SUBCUTANEOUS at 21:04

## 2022-02-09 RX ADMIN — POTASSIUM CHLORIDE AND SODIUM CHLORIDE: 900; 300 INJECTION, SOLUTION INTRAVENOUS at 19:30

## 2022-02-09 NOTE — ROUTINE PROCESS
TRANSFER - OUT REPORT:    Verbal report given to NNEKA Hanna(name) on Domingo Berry  being transferred to (unit) for routine progression of care       Report consisted of patients Situation, Background, Assessment and   Recommendations(SBAR). Information from the following report(s) SBAR, ED Summary, Intake/Output, Recent Results and Quality Measures was reviewed with the receiving nurse. Lines:   Peripheral IV 02/09/22 Left Antecubital (Active)   Site Assessment Clean, dry, & intact 02/09/22 1327   Phlebitis Assessment 0 02/09/22 1327   Infiltration Assessment 0 02/09/22 1327   Dressing Status Clean, dry, & intact 02/09/22 1327   Dressing Type 4 X 4 02/09/22 1327        Opportunity for questions and clarification was provided.       Patient transported with:   Registered Nurse

## 2022-02-09 NOTE — ED TRIAGE NOTES
From home, reports having l. Jaw pain since this am with sob. Client in atrial fib on ems arrival 110HR and high. Client doesn't take any meds for afib. Hx of dementia.

## 2022-02-09 NOTE — PROGRESS NOTES
MRI Safety Screening form needs to be filled out and FAXED to [phone] BEFORE MRI can be scheduled. If unable to acquire information from patient, [facility] must be contacted, or screening xrays will need to be ordred.     If pt is Claustrophobic or needs Pain Meds, please have these ordered in advance to help facilitate MRI exam.

## 2022-02-09 NOTE — ED NOTES
The patient failed her pre-swallowing assessment due to dysarthria and history of difficulty swallowing.   Will keep NPO and consult SLP

## 2022-02-09 NOTE — H&P
Hospitalist Admission History and Physical    NAME:  Machelle Puentes   :   1930   MRN:   [id number]     PCP:  Avelino Gill MD  Date/Time:  2022 4:08 PM    Subjective:   CHIEF COMPLAINT:      HISTORY OF PRESENT ILLNESS:     Moose Kapadia is a 80 y.o.  female with PMH of atrial fibrillation, dementia, urinary incontinence, hypertension who presents with some reports of dysarthria and? Left jaw pain earlier today per family. Patient is a poor historian due to dementia, family reports being diagnosed with moderate dementia in the past.  On questioning patient states to this provider that she has been having trouble talking for the last couple days, also reports some pain in bilateral knees however she does not endorse any other discomfort including no current jaw pain, no chest pain, no shortness of breath or other complaints, she states she just wants to get out of here. Further conversation with daughter-in-law and niece via phone call patient's  whom is primary caregiver  this past Saturday and patient has been living alone with family members visiting intermittently since Saturday. Gino Cassandra reports that she noticed patient had a little slurring of her speech earlier today and she had not noticed this before. Niece Ruby Louise reported that patient was complaining to her about left-sided jaw pain, shortness of breath as well. Per discussion with family appears that nieces are legal next of kin.       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 Normal LV function.  Cardiac echocardiogram 10/02/2012    EF 60%. No WMA. Mild-mod LAE.  Cardiac nuclear imaging test, low risk 10/02/2012    No ischemia or prior infarction. EF 77%. No WMA. Low risk pharm stress test.  Profound vagal response to Lexiscan.  Carotid duplex 2006    No significant stenosis bilaterally.  Dizziness     Dyspnea     Fecal incontinence     Follow-up exam 2010    Hematuria     Hepatitis B     Hip pain 2010    Hypertension     Mixed stress and urge urinary incontinence     Overactive bladder     Spinal stenosis        Past Surgical History:   Procedure Laterality Date    CT ABD PELV W CONT      surgery for diverticulitis    HX APPENDECTOMY      HX BACK SURGERY      HX CHOLECYSTECTOMY      HX HEART CATHETERIZATION  10/16/90    HX ORTHOPAEDIC  0890-8940    5 back surgeries    HX ORTHOPAEDIC  3/06    left rotator cuff repair    [place] BREAST SURGERY PROCEDURE UNLISTED      both breasts 3 times each       Social History     Tobacco Use    Smoking status: Former Smoker     Packs/day: 1.00     Years: 6.00     Pack years: 6.00     Quit date: 1971     Years since quittin.1    Smokeless tobacco: Never Used   Substance Use Topics    Alcohol use:  Yes     Alcohol/week: 2.5 standard drinks     Types: 3 Glasses of wine per week        Family History   Problem Relation Age of Onset    Other Mother         hx of heart disorder    Hypertension Mother     Stroke Mother     Other Father         hx of heart disorder    Hypertension Father     Diabetes Father     Cancer Other         Allergies   Allergen Reactions    Iodinated Contrast Media Swelling     Swollen all over with welps        Prior to Admission Medications   Prescriptions Last Dose Informant Patient Reported? Taking?   aspirin delayed-release 81 mg tablet 2022  No Yes   Sig: Take 1 Tab by mouth daily. donepeziL (Aricept) 5 mg tablet 2022  Yes Yes   Sig: Take 5 mg by mouth nightly. nitroglycerin (NITROSTAT) 0.4 mg SL tablet Unknown at Unknown time  No No   Si Tab by SubLINGual route every five (5) minutes as needed. Patient not taking: Reported on 2022   omeprazole (PRILOSEC) 20 mg capsule 2022  Yes Yes   Sig: Take 20 mg by mouth daily. tolterodine ER (DETROL-LA) 2 mg ER capsule   Yes Yes   Sig: Take 2 mg by mouth daily. Facility-Administered Medications: None       REVIEW OF SYSTEMS:     [x] Unable to obtain  ROS due to  [x]mental status change  []sedated   []intubated   []Total of 12 systems reviewed as follows:    Very limited review of systems from patient in setting of confusion and very hard of hearing. Patient states she has been having trouble talking for the last couple days, reports some pain to bilateral knees and no other current complaints other than wanting to leave hospital.    Objective:   VITALS:    Visit Vitals  BP (!) 158/56   Pulse 100   Temp 98.1 °F (36.7 °C)   Resp 23   SpO2 97%     Temp (24hrs), Av °F (36.7 °C), Min:97.9 °F (36.6 °C), Max:98.1 °F (36.7 °C)      PHYSICAL EXAM:   General:          Alert, somewhat anxious; generally  frail-appearing with muscle wasting   HEENT:          Sclera anicteric. Conjunctiva pink. Mucous membranes                          Moist, no ear or nasal discharge  Neck:               Supple. Trachea midline. No accessory muscle use. No jugular venous distention, no carotid bruit  CV:                  Irreg rhythm, HR improved. S1S2+  Lungs:             Clear to auscultation bilaterally. No Wheezing or Rhonchi. No rales. Abdomen:        Soft, non-tender. Not distended. Bowel sounds normal.   Extremities:     No cyanosis. No edema. Pulses 2+ b/l  Neurologic:      Alert and oriented X person and hospital, not to place or situation. Follows simple commands, responds appropriately. Appears grossly equal  Skin:                Warm and dry. No rashes. LAB DATA REVIEWED:    No components found for: [place]  Recent Labs     22  1255   *   K 2.9*   *   CO2 19*   BUN 10   CREA 0.18*   GLU 94   CA 5.2*   ALB 1.8*   WBC 14.6*   HGB 11.8*   HCT 36.8   *     IMAGING RESULTS:     [x]  I have personally reviewed the actual   [x]CXR  [x]CT scan    Assessment/Plan:      Active Problems:    A-fib ([address] (2022)      Dysarthria (2022)     ___________________________________________________  PLAN:    1. Dysarthria -improved, rule out CVA; check MRI, cardiac monitoring, echo, neurology consult, allow permissive hypertension, lipid panel /A1c in a.m., ASA and statin. Head CT negative  2. Hypocalcemia -check ionized calcium, ? In setting of low albumin stores. Nutrition consulted  3. Hypokalemia -provide oral and IV replacement, check mag  4. Hypernatremia d/t likely dehydration -provide IV fluids for now  5. Leukocytosis - chest x-ray normal, check urinalysis  6. A. fib with RVR -rate now improved, not on beta-blocker or blood thinner prior to admission, cardiac monitoring, DVT prophylaxis for now  7. Mild leukocytosis - cxray normal, await urinalysis, procal returned normal.  Monitor off abx, recheck in AM  8. H/o Dementia   9. CODE STATUS -remote discussion of DNR however no documents completed, discussed with family, niece whom is legal next of kin. Also discussed with stepdaughter Jerome Napoles. All are going to discuss CODE STATUS -have encouraged family to consider DNR/DNI in setting of dementia, advanced age and documented prior (remote) conversations.   10. Disposition -patient living with  whom is now  until 5 days ago, PT OT consulted, case management will be needed    Consults called / follow up -neurology, palliative medicine  Prophylaxis:  [x]Lovenox  []Coumadin  []Hep SQ  []SCDs  []H2B/PPI    Discussed Code Status:    [x]Full Code      []DNR     ___________________________________________________  Admitting Provider: Zoe Ibrahim NP

## 2022-02-09 NOTE — ED PROVIDER NOTES
EMERGENCY DEPARTMENT HISTORY AND PHYSICAL EXAM  This was created with voice recognition software and transcription errors may be present.     1:02 PM  Date: 2/9/2022  Patient Name: Yaneli Velasco    History of Presenting Illness     Chief Complaint:    History Provided By:     HPI: Yaneli Velasco is a 80 y.o. female past medical history stable angina bulging disc prior duplex dizziness dyspnea hematuria hep B hip pain hypertension who presents with difficulty speaking. Triage notes jaw pain the patient actually denied jaw pain she just says she is having trouble with her words. No fever no chills no blurry vision no weakness otherwise. PCP: Ira Sorenson MD      Past History     Past Medical History:  Past Medical History:   Diagnosis Date    Atypical angina ([address]     Bulging disc 9/21/2010    Cardiac catheterization 10/16/1990    Patent coronary arteries. Normal LV function.  Cardiac echocardiogram 10/02/2012    EF 60%. No WMA. Mild-mod LAE.  Cardiac nuclear imaging test, low risk 10/02/2012    No ischemia or prior infarction. EF 77%. No WMA. Low risk pharm stress test.  Profound vagal response to Lexiscan.  Carotid duplex 01/25/2006    No significant stenosis bilaterally.     Dizziness     Dyspnea     Fecal incontinence     Follow-up exam 9/21/2010    Hematuria     Hepatitis B     Hip pain 4/13/2010    Hypertension     Mixed stress and urge urinary incontinence     Overactive bladder     Spinal stenosis        Past Surgical History:  Past Surgical History:   Procedure Laterality Date    CT ABD PELV W CONT      surgery for diverticulitis    HX APPENDECTOMY      HX BACK SURGERY      HX CHOLECYSTECTOMY      HX HEART CATHETERIZATION  10/16/90    HX ORTHOPAEDIC  [id number]    5 back surgeries    HX ORTHOPAEDIC  3/06    left rotator cuff repair    MT BREAST SURGERY PROCEDURE UNLISTED      both breasts 3 times each       Family History:  Family History   Problem Relation Age of Onset    Other Mother         hx of heart disorder    Hypertension Mother     Stroke Mother     Other Father         hx of heart disorder    Hypertension Father     Diabetes Father     Cancer Other        Social History:  Social History     Tobacco Use    Smoking status: Former Smoker     Packs/day: 1.00     Years: 6.00     Pack years: 6.00     Quit date: 1971     Years since quittin.1    Smokeless tobacco: Never Used   Substance Use Topics    Alcohol use: Yes     Alcohol/week: 2.5 standard drinks     Types: 3 Glasses of wine per week    Drug use: No       Allergies: Allergies   Allergen Reactions    Iodinated Contrast Media Swelling     Swollen all over with welps       Review of Systems     Review of Systems   All other systems reviewed and are negative. 10 point review of systems otherwise negative unless noted in HPI. Physical Exam       Physical Exam  Constitutional:       Appearance: She is well-developed. HENT:      Head: Normocephalic and atraumatic. Eyes:      Pupils: Pupils are equal, round, and reactive to light. Cardiovascular:      Rate and Rhythm: Normal rate and regular rhythm. Heart sounds: Normal heart sounds. No murmur heard. No friction rub. Pulmonary:      Effort: Pulmonary effort is normal. No respiratory distress. Breath sounds: Normal breath sounds. No wheezing. Abdominal:      General: There is no distension. Palpations: Abdomen is soft. Tenderness: There is no abdominal tenderness. There is no guarding or rebound. Musculoskeletal:         General: Normal range of motion. Cervical back: Normal range of motion and neck supple. Skin:     General: Skin is warm and dry. Neurological:      Mental Status: She is alert. Comments: Dysarthria   Psychiatric:         Behavior: Behavior normal.         Thought Content:  Thought content normal.         Diagnostic Study Results     Vital Signs  EKG: EG shows A. fib 123 normal axis normal intervals there is no ST elevation or depression no hypertrophy  Labs: See chemistry unremarkable mild hypokalemia noted hypocalcemia Trop negative  Imaging:     Medical Decision Making     ED Course: Progress Notes, Reevaluation, and Consults:    I will be the provider of record for this patient. Provider Notes (Medical Decision Making): 49-year-old female presents with difficulty speaking. Triage notes state that she had chest pain and shortness of breath she did not complain of either of those she when asked about her jaw pain and said that is she does not have pain but that she can speak last known well time last night    Sending symptoms potential stroke versus ACS will admit to the hospitalist service    ED Course as of 02/09/22 1421   Wed Feb 09, 2022   1320 Per radiology, neg CT [CB]      ED Course User Index  [CB] Eduardo Bravo MD       Diagnosis     Clinical Impression: No diagnosis found. Disposition:        Patient's Medications   Start Taking    No medications on file   Continue Taking    AMOXICILLIN (AMOXIL) 875 MG TABLET        ASPIRIN DELAYED-RELEASE 81 MG TABLET    Take 1 Tab by mouth daily. BENZONATATE (TESSALON) 100 MG CAPSULE        BETA-CAROTENE,A, W-C & E/MIN (ICAPS PO)    Take 1 Tab by mouth daily. CHOLECALCIFEROL, VITAMIN D3, (VITAMIN D-3) 2,000 UNIT TAB    Take 1 Tab by mouth daily. COLESTIPOL (COLESTID) 1 GRAM TABLET        DONEPEZIL (ARICEPT) 10 MG TABLET    Take 10 mg by mouth nightly. LEVOFLOXACIN (LEVAQUIN) 500 MG TABLET    Take 1 Tab by mouth daily. MECLIZINE (ANTIVERT) 25 MG TABLET    Take 25 mg by mouth as needed. MELOXICAM (MOBIC) 15 MG TABLET    Take 15 mg by mouth daily. MEMANTINE (NAMENDA) 10 MG TABLET    Take 10 mg by mouth daily. METOPROLOL-XL (TOPROL XL) 50 MG XL TABLET    Take 50 mg by mouth daily. MIRABEGRON (MYRBETRIQ) 25 MG TABLET    Take 1 Tab by mouth daily.     MIRABEGRON ER (MYRBETRIQ) 50 MG ER TABLET Take 1 Tab by mouth daily. MULTIVITAMIN PO    Take 1 Tab by mouth daily. NITROGLYCERIN (NITROSTAT) 0.4 MG SL TABLET    1 Tab by SubLINGual route every five (5) minutes as needed. OMEPRAZOLE (PRILOSEC) 20 MG CAPSULE    Take 20 mg by mouth daily. PREDNISONE (DELTASONE) 10 MG TABLET    3 tabs daily for 7 days, 2 tabs daily for 7 days, then 1 tab daily until gone. RISEDRONATE (ACTONEL) 35 MG TABLET    Take 35 mg by mouth every Sunday. SOLIFENACIN (VESICARE) 10 MG TABLET    Take 1 Tab by mouth daily. TRAMADOL (ULTRAM) 50 MG TABLET    Take 1 Tab by mouth every six (6) hours as needed for Pain. Max Daily Amount: 200 mg.    These Medications have changed    No medications on file   Stop Taking    No medications on file

## 2022-02-10 ENCOUNTER — APPOINTMENT (OUTPATIENT)
Dept: NON INVASIVE DIAGNOSTICS | Age: 87
DRG: 158 | End: 2022-02-10
Attending: STUDENT IN AN ORGANIZED HEALTH CARE EDUCATION/TRAINING PROGRAM
Payer: MEDICARE

## 2022-02-10 ENCOUNTER — APPOINTMENT (OUTPATIENT)
Dept: GENERAL RADIOLOGY | Age: 87
DRG: 158 | End: 2022-02-10
Attending: EMERGENCY MEDICINE
Payer: MEDICARE

## 2022-02-10 ENCOUNTER — APPOINTMENT (OUTPATIENT)
Dept: VASCULAR SURGERY | Age: 87
DRG: 158 | End: 2022-02-10
Attending: STUDENT IN AN ORGANIZED HEALTH CARE EDUCATION/TRAINING PROGRAM
Payer: MEDICARE

## 2022-02-10 LAB
ANION GAP SERPL CALC-SCNC: 4 MMOL/L (ref 3–18)
BASOPHILS # BLD: 0.3 K/UL (ref 0–0.1)
BASOPHILS NFR BLD: 2 % (ref 0–2)
BUN SERPL-MCNC: 14 MG/DL (ref 7–18)
BUN/CREAT SERPL: 27 (ref 12–20)
CALCIUM SERPL-MCNC: 9.3 MG/DL (ref 8.5–10.1)
CHLORIDE SERPL-SCNC: 106 MMOL/L (ref 100–111)
CHOLEST SERPL-MCNC: 88 MG/DL
CO2 SERPL-SCNC: 28 MMOL/L (ref 21–32)
CREAT SERPL-MCNC: 0.52 MG/DL (ref 0.6–1.3)
DIFFERENTIAL METHOD BLD: ABNORMAL
ECHO AO ROOT DIAM: 3.2 CM
ECHO AO ROOT INDEX: 2.03 CM/M2
ECHO LA VOL 2C: 34 ML (ref 22–52)
ECHO LA VOL 4C: 35 ML (ref 22–52)
ECHO LA VOLUME AREA LENGTH: 40 ML
ECHO LA VOLUME INDEX A2C: 22 ML/M2 (ref 16–34)
ECHO LA VOLUME INDEX A4C: 22 ML/M2 (ref 16–34)
ECHO LA VOLUME INDEX AREA LENGTH: 25 ML/M2 (ref 16–34)
ECHO LV E' LATERAL VELOCITY: 10 CM/S
ECHO LV E' SEPTAL VELOCITY: 7 CM/S
ECHO LV FRACTIONAL SHORTENING: 35 % (ref 28–44)
ECHO LV INTERNAL DIMENSION DIASTOLE INDEX: 2.53 CM/M2
ECHO LV INTERNAL DIMENSION DIASTOLIC: 4 CM (ref 3.9–5.3)
ECHO LV INTERNAL DIMENSION SYSTOLIC INDEX: 1.65 CM/M2
ECHO LV INTERNAL DIMENSION SYSTOLIC: 2.6 CM
ECHO LV IVSD: 1 CM (ref 0.6–0.9)
ECHO LV MASS 2D: 118.2 G (ref 67–162)
ECHO LV MASS INDEX 2D: 74.8 G/M2 (ref 43–95)
ECHO LV POSTERIOR WALL DIASTOLIC: 0.9 CM (ref 0.6–0.9)
ECHO LV RELATIVE WALL THICKNESS RATIO: 0.45
ECHO LVOT AREA: 2.8 CM2
ECHO LVOT DIAM: 1.9 CM
ECHO LVOT MEAN GRADIENT: 1 MMHG
ECHO LVOT PEAK GRADIENT: 3 MMHG
ECHO LVOT PEAK VELOCITY: 0.8 M/S
ECHO LVOT STROKE VOLUME INDEX: 29.2 ML/M2
ECHO LVOT SV: 46.2 ML
ECHO LVOT VTI: 16.3 CM
ECHO MV A VELOCITY: 0.75 M/S
ECHO MV E DECELERATION TIME (DT): 213 MS
ECHO MV E VELOCITY: 0.7 M/S
ECHO MV E/A RATIO: 0.93
ECHO MV E/E' LATERAL: 7
ECHO MV E/E' RATIO (AVERAGED): 8.5
ECHO MV E/E' SEPTAL: 10
ECHO PULMONARY ARTERY SYSTOLIC PRESSURE (PASP): 31 MMHG
ECHO RV TAPSE: 1.9 CM (ref 1.5–2)
ECHO TV REGURGITANT MAX VELOCITY: 2.67 M/S
ECHO TV REGURGITANT PEAK GRADIENT: 29 MMHG
EOSINOPHIL # BLD: 0 K/UL (ref 0–0.4)
EOSINOPHIL NFR BLD: 0 % (ref 0–5)
ERYTHROCYTE [DISTWIDTH] IN BLOOD BY AUTOMATED COUNT: 14.4 % (ref 11.6–14.5)
EST. AVERAGE GLUCOSE BLD GHB EST-MCNC: 105 MG/DL
GLUCOSE BLD STRIP.AUTO-MCNC: 107 MG/DL (ref 70–110)
GLUCOSE BLD STRIP.AUTO-MCNC: 136 MG/DL (ref 70–110)
GLUCOSE BLD STRIP.AUTO-MCNC: 86 MG/DL (ref 70–110)
GLUCOSE SERPL-MCNC: 110 MG/DL (ref 74–99)
HBA1C MFR BLD: 5.3 % (ref 4.2–5.6)
HCT VFR BLD AUTO: 35.1 % (ref 35–45)
HDLC SERPL-MCNC: 61 MG/DL (ref 40–60)
HDLC SERPL: 1.4 {RATIO} (ref 0–5)
HGB BLD-MCNC: 10.9 G/DL (ref 12–16)
IMM GRANULOCYTES # BLD AUTO: 0 K/UL
IMM GRANULOCYTES NFR BLD AUTO: 0 %
LDLC SERPL CALC-MCNC: 15.2 MG/DL (ref 0–100)
LIPID PROFILE,FLP: ABNORMAL
LYMPHOCYTES # BLD: 3.9 K/UL (ref 0.9–3.6)
LYMPHOCYTES NFR BLD: 27 % (ref 21–52)
MAGNESIUM SERPL-MCNC: 1.3 MG/DL (ref 1.6–2.3)
MCH RBC QN AUTO: 32.9 PG (ref 24–34)
MCHC RBC AUTO-ENTMCNC: 31.1 G/DL (ref 31–37)
MCV RBC AUTO: 106 FL (ref 78–100)
MONOCYTES # BLD: 0.7 K/UL (ref 0.05–1.2)
MONOCYTES NFR BLD: 5 % (ref 3–10)
NEUTS SEG # BLD: 9.7 K/UL (ref 1.8–8)
NEUTS SEG NFR BLD: 66 % (ref 40–73)
NRBC # BLD: 0 K/UL (ref 0–0.01)
NRBC BLD-RTO: 0 PER 100 WBC
PLATELET # BLD AUTO: 604 K/UL (ref 135–420)
PLATELET COMMENTS,PCOM: ABNORMAL
PMV BLD AUTO: 8.8 FL (ref 9.2–11.8)
POTASSIUM SERPL-SCNC: 4.6 MMOL/L (ref 3.5–5.5)
RBC # BLD AUTO: 3.31 M/UL (ref 4.2–5.3)
RBC MORPH BLD: ABNORMAL
SODIUM SERPL-SCNC: 138 MMOL/L (ref 136–145)
TRIGL SERPL-MCNC: 59 MG/DL (ref ?–150)
VLDLC SERPL CALC-MCNC: 11.8 MG/DL
WBC # BLD AUTO: 14.6 K/UL (ref 4.6–13.2)

## 2022-02-10 PROCEDURE — 74011250636 HC RX REV CODE- 250/636: Performed by: EMERGENCY MEDICINE

## 2022-02-10 PROCEDURE — 74011250637 HC RX REV CODE- 250/637: Performed by: EMERGENCY MEDICINE

## 2022-02-10 PROCEDURE — 85025 COMPLETE CBC W/AUTO DIFF WBC: CPT

## 2022-02-10 PROCEDURE — 97162 PT EVAL MOD COMPLEX 30 MIN: CPT

## 2022-02-10 PROCEDURE — 74011250636 HC RX REV CODE- 250/636: Performed by: NURSE PRACTITIONER

## 2022-02-10 PROCEDURE — 74011250637 HC RX REV CODE- 250/637: Performed by: NURSE PRACTITIONER

## 2022-02-10 PROCEDURE — 80048 BASIC METABOLIC PNL TOTAL CA: CPT

## 2022-02-10 PROCEDURE — 97116 GAIT TRAINING THERAPY: CPT

## 2022-02-10 PROCEDURE — 99221 1ST HOSP IP/OBS SF/LOW 40: CPT | Performed by: STUDENT IN AN ORGANIZED HEALTH CARE EDUCATION/TRAINING PROGRAM

## 2022-02-10 PROCEDURE — 93880 EXTRACRANIAL BILAT STUDY: CPT

## 2022-02-10 PROCEDURE — 65660000000 HC RM CCU STEPDOWN

## 2022-02-10 PROCEDURE — 99221 1ST HOSP IP/OBS SF/LOW 40: CPT | Performed by: NURSE PRACTITIONER

## 2022-02-10 PROCEDURE — 80061 LIPID PANEL: CPT

## 2022-02-10 PROCEDURE — 97530 THERAPEUTIC ACTIVITIES: CPT

## 2022-02-10 PROCEDURE — 74011000250 HC RX REV CODE- 250: Performed by: EMERGENCY MEDICINE

## 2022-02-10 PROCEDURE — 92610 EVALUATE SWALLOWING FUNCTION: CPT

## 2022-02-10 PROCEDURE — 83036 HEMOGLOBIN GLYCOSYLATED A1C: CPT

## 2022-02-10 PROCEDURE — 99232 SBSQ HOSP IP/OBS MODERATE 35: CPT | Performed by: EMERGENCY MEDICINE

## 2022-02-10 PROCEDURE — 70330 X-RAY EXAM OF JAW JOINTS: CPT

## 2022-02-10 PROCEDURE — 2709999900 HC NON-CHARGEABLE SUPPLY

## 2022-02-10 PROCEDURE — 92526 ORAL FUNCTION THERAPY: CPT

## 2022-02-10 PROCEDURE — 82962 GLUCOSE BLOOD TEST: CPT

## 2022-02-10 PROCEDURE — 93306 TTE W/DOPPLER COMPLETE: CPT

## 2022-02-10 PROCEDURE — 97166 OT EVAL MOD COMPLEX 45 MIN: CPT

## 2022-02-10 PROCEDURE — 36415 COLL VENOUS BLD VENIPUNCTURE: CPT

## 2022-02-10 RX ORDER — ATORVASTATIN CALCIUM 20 MG/1
20 TABLET, FILM COATED ORAL
Status: DISCONTINUED | OUTPATIENT
Start: 2022-02-10 | End: 2022-02-13 | Stop reason: HOSPADM

## 2022-02-10 RX ORDER — MAGNESIUM SULFATE HEPTAHYDRATE 40 MG/ML
2 INJECTION, SOLUTION INTRAVENOUS ONCE
Status: COMPLETED | OUTPATIENT
Start: 2022-02-10 | End: 2022-02-10

## 2022-02-10 RX ORDER — GUAIFENESIN 100 MG/5ML
81 LIQUID (ML) ORAL DAILY
Status: DISCONTINUED | OUTPATIENT
Start: 2022-02-11 | End: 2022-02-13 | Stop reason: HOSPADM

## 2022-02-10 RX ORDER — SODIUM CHLORIDE 9 MG/ML
10 INJECTION INTRAMUSCULAR; INTRAVENOUS; SUBCUTANEOUS
Status: COMPLETED | OUTPATIENT
Start: 2022-02-10 | End: 2022-02-10

## 2022-02-10 RX ORDER — ASPIRIN 300 MG/1
300 SUPPOSITORY RECTAL DAILY
Status: DISCONTINUED | OUTPATIENT
Start: 2022-02-10 | End: 2022-02-10

## 2022-02-10 RX ADMIN — CALCIUM CARBONATE (ANTACID) CHEW TAB 500 MG 200 MG: 500 CHEW TAB at 17:57

## 2022-02-10 RX ADMIN — POTASSIUM CHLORIDE 10 MEQ: 7.46 INJECTION, SOLUTION INTRAVENOUS at 01:52

## 2022-02-10 RX ADMIN — SODIUM CHLORIDE 10 ML: 9 INJECTION, SOLUTION INTRAMUSCULAR; INTRAVENOUS; SUBCUTANEOUS at 10:20

## 2022-02-10 RX ADMIN — ASPIRIN 300 MG: 300 SUPPOSITORY RECTAL at 11:30

## 2022-02-10 RX ADMIN — ATORVASTATIN CALCIUM 20 MG: 20 TABLET, FILM COATED ORAL at 21:30

## 2022-02-10 RX ADMIN — POTASSIUM CHLORIDE 10 MEQ: 7.46 INJECTION, SOLUTION INTRAVENOUS at 01:11

## 2022-02-10 RX ADMIN — POTASSIUM CHLORIDE 10 MEQ: 7.46 INJECTION, SOLUTION INTRAVENOUS at 00:01

## 2022-02-10 RX ADMIN — CALCIUM CARBONATE (ANTACID) CHEW TAB 500 MG 200 MG: 500 CHEW TAB at 11:30

## 2022-02-10 RX ADMIN — MAGNESIUM SULFATE 2 G: 2 INJECTION INTRAVENOUS at 18:21

## 2022-02-10 RX ADMIN — ENOXAPARIN SODIUM 40 MG: 100 INJECTION SUBCUTANEOUS at 17:57

## 2022-02-10 NOTE — PROGRESS NOTES
Reason for Admission:  Dysarthria [R47.1]  A-fib ([address] [I48.91]                 RUR Score:    15            Plan for utilizing home health:    yes                      Likelihood of Readmission:   Moderate                         Do you (patient/family) have any concerns for transition/discharge?  no    Transition of Care Plan:       Initial assessment completed with relative(s). Cognitive status of patient: only aware of  place and person. Face sheet information confirmed:  yes. The patient designates her niece Nancy Rich to participate in her discharge plan and to receive any needed information. This patient lives in a single family home alone,  recently passed away. Patient is able to navigate steps as needed. Prior to hospitalization, patient was considered to be independent with ADLs/IADLS : yes . Patient has a current ACP document on file: yes      Healthcare Decision Maker:     Click here to complete [address] including selection of the Healthcare Decision Maker Relationship (ie \"Primary\")    The neice will be available to transport patient home upon discharge. The patient already has [facility] Me[facility], available in the home. Patient is not currently active with home health. Patient has not stayed in a skilled nursing facility or rehab. This patient is on dialysis :no    List of available Home Health agencies were provided and reviewed with the patient prior to discharge. Freedom of choice signed: yes, for any available agency. Currently, the discharge plan is Home with  [facility]. The patient states that she can obtain her medications from the pharmacy, and take her medications as directed. Patient's current insurance is Medicare      Care Management Interventions  PCP Verified by CM:  Yes  Mode of Transport at Discharge: Self  Transition of Care Consult (CM Consult): Home Health  Physical Therapy Consult: Yes  Occupational Therapy Consult: Yes  Support Systems: Other Family Member(s)  Confirm Follow Up Transport: Family  The Plan for Transition of Care is Related to the Following Treatment Goals : Home with home health  The Patient and/or Patient Representative was Provided with a Choice of Provider and Agrees with the Discharge Plan?: Yes  Freedom of Choice List was Provided with Basic Dialogue that Supports the Patient's Individualized Plan of Care/Goals, Treatment Preferences and Shares the Quality Data Associated with the Providers?: Yes  Discharge Location  Patient Expects to be Discharged to[de-identified] Home with home health    Spoke with niece Ola Scheuermann, states the patient has had the COVID vaccine as well as the booster.     Nathen Branch RN

## 2022-02-10 NOTE — PROGRESS NOTES
[facility] REFERRAL CONTACT NOTE  [address] [place] [address] Physical Rehabilitation    RE: Reyna Muir    Referral received to review this patient's case for admission to [address] [place] [address] Physical Rehabilitation. Current status reviewed.  When appropriate, will need PT/OT/ST evaluation/treatment on this patient to complete the pre-admission evaluation.  Will continue to follow. Thank you for this referral.  Should you have any questions please do not hesitate to call. Sincerely,  Chase Chou.  [address] [place] [address] Physical Rehabilitation  [phone]

## 2022-02-10 NOTE — PROGRESS NOTES
[facility] Hospitalist Group  Progress Note    Patient: Sony Simon Age: 80 y.o. : 1930 MR#: [id number] SSN: [id number]  Date/Time: 2/10/2022    Subjective:     Patient is laying in bed in no apparent distress, follows simple commands denies any headaches or facial pain. Speech is clear    Assessment/Plan:     Possible TIA  Atrial fibrillation  Dementia  Leukocytosis  Hypokalemia  Hypocalcemia  Left TMJ tenderness    Plan   Continue aspirin and statin  Diet as per speech  Leukocytosis may be a stress reaction  Monitor  Monitor electrolytes  Obtain bilateral TMJ x-ray  PT and OT recommend home with  care  Palliative care has been consulted  I called patient's niece Lilo Louise at phone #[phone] and updated her regarding patient's medical care. I discussed with her regarding PT OT recommendations. Karl Diop is meeting with palliative care tomorrow.   Case management consult    Case discussed with:  [x]Patient  []Family  []Nursing  []Case Management  DVT Prophylaxis:  [x]Lovenox  []Hep SQ  []SCDs  []Coumadin   []On Heparin gtt    Objective:   VS:   Visit Vitals  BP (!) 159/61   Pulse 86   Temp 98.4 °F (36.9 °C)   Resp 18   Ht 5' 5\" (1.651 m)   Wt 53.5 kg (118 lb)   SpO2 99%   Breastfeeding No   BMI 19.64 kg/m²      Tmax/24hrs: Temp (24hrs), Av.5 °F (36.9 °C), Min:97.8 °F (36.6 °C), Max:100.3 °F (37.9 °C)    Input/Output:     Intake/Output Summary (Last 24 hours) at 2/10/2022 1722  Last data filed at 2/10/2022 6713  Gross per 24 hour   Intake 822.5 ml   Output 200 ml   Net 622.5 ml       General:  Awake, follows simple commands  Cardiovascular:  S1S2+, RRR  Pulmonary:  CTA b/l  GI:  Soft, BS+, NT, ND  Extremities:  No edema  Speech is clear, patient is able to move all 4 extremities    Labs:    Recent Results (from the past 24 hour(s))   CALCIUM, IONIZED    Collection Time: 22  5:40 PM   Result Value Ref Range    Ionized Calcium 1.01 (L) 1.15 - 1.33 MMOL/L   PHOSPHORUS Collection Time: 02/09/22  5:40 PM   Result Value Ref Range    Phosphorus 2.6 2.5 - 4.9 MG/DL   URINALYSIS W/ RFLX MICROSCOPIC    Collection Time: 02/09/22  9:15 PM   Result Value Ref Range    Color YELLOW      Appearance CLEAR      Specific gravity 1.020 1.005 - 1.030      pH (UA) 7.0 5.0 - 8.0      Protein TRACE (A) NEG mg/dL    Glucose Negative NEG mg/dL    Ketone Negative NEG mg/dL    Bilirubin Negative NEG      Blood SMALL (A) NEG      Urobilinogen 1.0 0.2 - 1.0 EU/dL    Nitrites Negative NEG      Leukocyte Esterase TRACE (A) NEG     URINE MICROSCOPIC ONLY    Collection Time: 02/09/22  9:15 PM   Result Value Ref Range    WBC 0 to 3 0 - 4 /hpf    RBC 4 to 10 0 - 5 /hpf    Epithelial cells FEW 0 - 5 /lpf    Bacteria Negative NEG /hpf   GLUCOSE, POC    Collection Time: 02/09/22 11:14 PM   Result Value Ref Range    Glucose (POC) 121 (H) 70 - 110 mg/dL   LIPID PANEL    Collection Time: 02/10/22  4:00 AM   Result Value Ref Range    LIPID PROFILE          Cholesterol, total 88 <200 MG/DL    Triglyceride 59 <150 MG/DL    HDL Cholesterol 61 (H) 40 - 60 MG/DL    LDL, calculated 15.2 0 - 100 MG/DL    VLDL, calculated 11.8 MG/DL    CHOL/HDL Ratio 1.4 0 - 5.0     HEMOGLOBIN A1C WITH EAG    Collection Time: 02/10/22  4:00 AM   Result Value Ref Range    Hemoglobin A1c 5.3 4.2 - 5.6 %    Est. average glucose 751 mg/dL   METABOLIC PANEL, BASIC    Collection Time: 02/10/22  4:00 AM   Result Value Ref Range    Sodium 138 136 - 145 mmol/L    Potassium 4.6 3.5 - 5.5 mmol/L    Chloride 106 100 - 111 mmol/L    CO2 28 21 - 32 mmol/L    Anion gap 4 3.0 - 18 mmol/L    Glucose 110 (H) 74 - 99 mg/dL    BUN 14 7.0 - 18 MG/DL    Creatinine 0.52 (L) 0.6 - 1.3 MG/DL    BUN/Creatinine ratio 27 (H) 12 - 20      GFR est AA >60 >60 ml/min/1.73m2    GFR est non-AA >60 >60 ml/min/1.73m2    Calcium 9.3 8.5 - 10.1 MG/DL   CBC WITH AUTOMATED DIFF    Collection Time: 02/10/22  4:00 AM   Result Value Ref Range    WBC 14.6 (H) 4.6 - 13.2 K/uL    RBC 3.31 (L) 4.20 - 5.30 M/uL    HGB 10.9 (L) 12.0 - 16.0 g/dL    HCT 35.1 35.0 - 45.0 %    .0 (H) 78.0 - 100.0 FL    MCH 32.9 24.0 - 34.0 PG    MCHC 31.1 31.0 - 37.0 g/dL    RDW 14.4 11.6 - 14.5 %    PLATELET 246 (H) 929 - 420 K/uL    MPV 8.8 (L) 9.2 - 11.8 FL    NRBC 0.0 0  WBC    ABSOLUTE NRBC 0.00 0.00 - 0.01 K/uL    NEUTROPHILS 66 40 - 73 %    LYMPHOCYTES 27 21 - 52 %    MONOCYTES 5 3 - 10 %    EOSINOPHILS 0 0 - 5 %    BASOPHILS 2 0 - 2 %    IMMATURE GRANULOCYTES 0 %    ABS. NEUTROPHILS 9.7 (H) 1.8 - 8.0 K/UL    ABS. LYMPHOCYTES 3.9 (H) 0.9 - 3.6 K/UL    ABS. MONOCYTES 0.7 0.05 - 1.2 K/UL    ABS. EOSINOPHILS 0.0 0.0 - 0.4 K/UL    ABS. BASOPHILS 0.3 (H) 0.0 - 0.1 K/UL    ABS. IMM.  GRANS. 0.0 K/UL    DF MANUAL      PLATELET COMMENTS Increased Platelets      RBC COMMENTS NORMOCYTIC, NORMOCHROMIC     GLUCOSE, POC    Collection Time: 02/10/22  7:08 AM   Result Value Ref Range    Glucose (POC) 107 70 - 110 mg/dL   DUPLEX CAROTID BILATERAL    Collection Time: 02/10/22  9:39 AM   Result Value Ref Range    Right cca dist sys 60.9 cm/s    Right CCA dist larkin 5.4 cm/s    RIGHT COMMON CAROTID ARTERY MID S 63.06 cm/s    RIGHT COMMON CAROTID ARTERY MID D 4.70 cm/s    Right CCA prox sys 51.7 cm/s    Right CCA prox larkin 4.0 cm/s    Right ICA dist sys 86.2 cm/s    Right ICA dist larkin 12.4 cm/s    Right ICA mid sys 67.7 cm/s    Right ICA mid larkin 10.1 cm/s    Right ICA prox sys 49.5 cm/s    Right ICA prox larkin 6.8 cm/s    Right eca sys 64.2 cm/s    RIGHT EXTERNAL CAROTID ARTERY D 0.00 cm/s    Right vertebral sys 56.4 cm/s    RIGHT VERTEBRAL ARTERY D 8.37 cm/s    Right subclavian prox .2 cm/s    Right subclavian prox EDV 0.0 cm/s    Right ICA/CCA sys 1.4     Left CCA dist sys 71.0 cm/s    Left CCA dist larkin 10.6 cm/s    LEFT COMMON CAROTID ARTERY MID S 69.37 cm/s    LEFT COMMON CAROTID ARTERY MID D 9.84 cm/s    Left CCA prox sys 87.5 cm/s    Left CCA prox larkin 9.8 cm/s    Left ICA dist sys 68.8 cm/s    Left ICA dist larkin 10.6 cm/s    Left ICA mid sys 66.6 cm/s    Left ICA mid larkin 10.6 cm/s    Left ICA prox sys 67.7 cm/s    Left ICA prox larkin 8.4 cm/s    Left ECA sys 63.3 cm/s    LEFT EXTERNAL CAROTID ARTERY D 5.05 cm/s    Left vertebral sys 57.5 cm/s    LEFT VERTEBRAL ARTERY D 8.02 cm/s    Left subclavian prox .9 cm/s    Left subclavian prox EDV 0.0 cm/s    Left ICA/CCA sys 0.97    ECHO ADULT COMPLETE    Collection Time: 02/10/22 10:19 AM   Result Value Ref Range    IVSd 1.0 (A) 0.6 - 0.9 cm    LVIDd 4.0 3.9 - 5.3 cm    LVIDs 2.6 cm    LVOT Diameter 1.9 cm    LVPWd 0.9 0.6 - 0.9 cm    LVOT SV 46.2 ml    LVOT Peak Gradient 3 mmHg    LVOT Mean Gradient 1 mmHg    LVOT Peak Velocity 0.8 m/s    LVOT VTI 16.3 cm    LA Volume A/L 40 mL    LA Volume 2C 34 22 - 52 mL    LA Volume 4C 35 22 - 52 mL    MV A Velocity 0.75 m/s    MV E Wave Deceleration Time 213.0 ms    MV E Velocity 0.70 m/s    LV E' Lateral Velocity 10 cm/s    LV E' Septal Velocity 7 cm/s    TAPSE 1.9 1.5 - 2.0 cm    TR Peak Gradient 29 mmHg    TR Max Velocity 2.67 m/s    Aortic Root 3.2 cm    Fractional Shortening 2D 35 28 - 44 %    LVIDd Index 2.53 cm/m2    LVIDs Index 1.65 cm/m2    LV RWT Ratio 0.45     LV Mass 2D 118.2 67 - 162 g    LV Mass 2D Index 74.8 43 - 95 g/m2    MV E/A 0.93     E/E' Ratio (Averaged) 8.50     E/E' Lateral 7.00     E/E' Septal 10.00     LA Volume Index A/L 25 16 - 34 mL/m2    LVOT Stroke Volume Index 29.2 mL/m2    LVOT Area 2.8 cm2    LA Volume Index 2C 22 16 - 34 mL/m2    LA Volume Index 4C 22 16 - 34 mL/m2    Ao Root Index 2.03 cm/m2    PASP 31 mmHg   GLUCOSE, POC    Collection Time: 02/10/22 11:51 AM   Result Value Ref Range    Glucose (POC) 136 (H) 70 - 110 mg/dL   GLUCOSE, POC    Collection Time: 02/10/22  4:38 PM   Result Value Ref Range    Glucose (POC) 86 70 - 110 mg/dL     Additional Data Reviewed:      Signed By: Grant Velarde MD     February 10, 2022

## 2022-02-10 NOTE — CONSULTS
[facility]: [phone]  [facility]: [phone]     Patient Name: Nany Patterson  YOB: 1930    Date of consult : 2/10/22  Reason for Consult: establish goals of care  Requesting Provider: Ida Mackey NP   Primary Care Physician: Simone Celis MD      SUMMARY:   Nany Patterson is a 80 y.o. female with a past history of dementia, atrial fibrillation, HTN, who was admitted on 2/9/2022 from home with a diagnosis of rule out CVA, A. fib with RVR. Current medical issues leading to Palliative Medicine involvement include: Supportive care and establish goals of care    CHIEF COMPLAINT: Confusion    HPI/SUBJECTIVE:    Patient is a 22-year-old  female that came into the emergency department yesterday with possible signs of CVA including dysarthria, slurred speech, left-sided jaw pain, shortness of breath and pain in bilateral knees. She was living with her  up until last Saturday when he passed away. The patient is:   [] Verbal and participatory  [x] Non-participatory due to: Confusion    GOALS OF CARE:  Patient/Health Care Proxy Stated Goals: Prolong life      TREATMENT PREFERENCES:   Code Status: Full Code         PALLIATIVE DIAGNOSES:   1. Goals of care/ACP  2. Dementia  3. General debility  4. Encounter for palliative medicine         PLAN:   1. Goals of care/ACP  This NP and MATT Valencia met with patient at the bedside. She is extremely hard of hearing and only oriented to herself. For this reason she is unable to participate in goals of care discussions. We have reached out to her stepdaughter who states the patient does not have an AMD.  For this reason her nieces will be her next of kin and medical decision makers. We have set up a meeting for tomorrow at 10:30 AM to discuss goals of care moving forward. At this time patient remains a full code with full interventions  2. Dementia  Per family reported as moderate progressive when at home. Patient has some decline since recent hospitalization and now only oriented to herself. Remains very pleasant. Has been evaluated by neurologist here at the hospital.  3.  General debility  Patient's current palliative performance score is around 50% she has a bed to chair existence with some ability to ambulate with assistance. She is unable to do any work due to extensive disease progression and frailty, patient also needs considerable amount of assistance with her functional ADLs and self-care. Her intake of nutrition is normal to reduced. 4.  Encounter for palliative medicine  Due to advanced age and general decline with frailty patient does have a poor long-term prognosis and high risk for further decline especially since the recent loss of her primary caregiver her . 5.   Initial consult note routed to primary continuity provider  6. Communicated plan of care with: Palliative IDT      Advance Care Planning:  [] [facility] Interdisciplinary Team has updated the ACP Navigator with [address] and Patient Capacity    Primary Decision Maker ([address]): RONDAK is the gill    Medical Interventions: Full interventions   Other Instructions:         As far as possible, the palliative care team has discussed with patient / health care proxy about goals of care / treatment preferences for patient. HISTORY:     History obtained from: chart review   Active Problems:    A-fib ([address] (2/9/2022)      Dysarthria (2/9/2022)      Past Medical History:   Diagnosis Date    Atypical angina ([address]     Bulging disc 9/21/2010    Cardiac catheterization 10/16/1990    Patent coronary arteries. Normal LV function.  Cardiac echocardiogram 10/02/2012    EF 60%. No WMA. Mild-mod LAE.  Cardiac nuclear imaging test, low risk 10/02/2012    No ischemia or prior infarction. EF 77%. No WMA.   Low risk pharm stress test. Profound vagal response to Lexiscan.  Carotid duplex 2006    No significant stenosis bilaterally.  Dizziness     Dyspnea     Fecal incontinence     Follow-up exam 2010    Hematuria     Hepatitis B     Hip pain 2010    Hypertension     Mixed stress and urge urinary incontinence     Overactive bladder     Spinal stenosis       Past Surgical History:   Procedure Laterality Date    CT ABD PELV W CONT      surgery for diverticulitis    HX APPENDECTOMY      HX BACK SURGERY      HX CHOLECYSTECTOMY      HX HEART CATHETERIZATION  10/16/90    HX ORTHOPAEDIC  [id number]    5 back surgeries    HX ORTHOPAEDIC  3/06    left rotator cuff repair    [place] BREAST SURGERY PROCEDURE UNLISTED      both breasts 3 times each      Family History   Problem Relation Age of Onset    Other Mother         hx of heart disorder    Hypertension Mother     Stroke Mother     Other Father         hx of heart disorder    Hypertension Father     Diabetes Father     Cancer Other      History reviewed, no pertinent family history.   Social History     Tobacco Use    Smoking status: Former Smoker     Packs/day: 1.00     Years: 6.00     Pack years: 6.00     Quit date: 1971     Years since quittin.1    Smokeless tobacco: Never Used   Substance Use Topics    Alcohol use: Not Currently     Alcohol/week: 2.5 standard drinks     Types: 3 Glasses of wine per week     Allergies   Allergen Reactions    Iodinated Contrast Media Swelling     Swollen all over with welps      Current Facility-Administered Medications   Medication Dose Route Frequency    aspirin (ASA) suppository 300 mg  300 mg Rectal DAILY    0.9% sodium chloride with KCl 40 mEq/L infusion   IntraVENous CONTINUOUS    labetaloL (NORMODYNE;TRANDATE) 20 mg/4 mL (5 mg/mL) injection 5 mg  5 mg IntraVENous Q10MIN PRN    enoxaparin (LOVENOX) injection 40 mg  40 mg SubCUTAneous Q24H    atorvastatin (LIPITOR) tablet 80 mg  80 mg Oral QHS    pantoprazole (PROTONIX) tablet 20 mg  20 mg Oral ACB    ondansetron (ZOFRAN) injection 4 mg  4 mg IntraVENous Q6H PRN    acetaminophen (TYLENOL) tablet 650 mg  650 mg Oral Q4H PRN    calcium carbonate (TUMS) chewable tablet 200 mg [elemental]  200 mg Oral TID WITH MEALS          Clinical Pain Assessment (nonverbal scale for nonverbal patients): Clinical Pain Assessment  Severity: 0          Duration: for how long has pt been experiencing pain (e.g., 2 days, 1 month, years)  Frequency: how often pain is an issue (e.g., several times per day, once every few days, constant)     FUNCTIONAL ASSESSMENT:     Palliative Performance Scale (PPS):  PPS: 50    ECOG  ECOG Status : Limited self-care     PSYCHOSOCIAL/SPIRITUAL SCREENING:      Any spiritual / Moravian concerns:  [] Yes /  [x] No    Caregiver Burnout:  [] Yes /  [] No /  [x] No Caregiver Present      Anticipatory grief assessment:   [x] Normal  / [] Maladaptive        REVIEW OF SYSTEMS:     Systems: constitutional, ears/nose/mouth/throat, respiratory, gastrointestinal, genitourinary, musculoskeletal, integumentary, neurologic, psychiatric, endocrine. Positive findings noted below. Modified ESAS Completed by: provider           Pain: 0           Dyspnea: 0               Positive and pertinent negative findings in ROS are noted above in HPI. The following systems were [x] reviewed / [] unable to be reviewed as noted in HPI  Other findings are noted below.      PHYSICAL EXAM:     Constitutional: alert and interactive, very hard of hearing   Eyes: pupils equal, anicteric  ENMT: no nasal discharge, moist mucous membranes  Cardiovascular: regular rhythm, distal pulses intact  Respiratory: breathing not labored, symmetric  Gastrointestinal: soft non-tender, +bowel sounds  Musculoskeletal: no deformity, no tenderness to palpation  Skin: warm, dry  Neurologic: Only oriented to self,  following commands, moving all extremities  Psychiatric: full affect, no hallucinations    Other: Wt Readings from Last 3 Encounters:   02/10/22 53.5 kg (118 lb)   11/16/21 53.5 kg (118 lb)   08/09/19 55.3 kg (122 lb)     Blood pressure (!) 160/67, pulse 86, temperature 98.2 °F (36.8 °C), resp. rate 18, height 5' 5\" (1.651 m), weight 53.5 kg (118 lb), SpO2 99 %, not currently breastfeeding. Pain:  Pain Scale 1: Numeric (0 - 10)  Pain Intensity 1: 0     Pain Location 1: Jaw  Pain Orientation 1: Left             LAB AND IMAGING FINDINGS:     Lab Results   Component Value Date/Time    WBC 14.6 (H) 02/10/2022 04:00 AM    HGB 10.9 (L) 02/10/2022 04:00 AM    PLATELET 851 (H) 62/80/9152 04:00 AM     Lab Results   Component Value Date/Time    Sodium 138 02/10/2022 04:00 AM    Potassium 4.6 02/10/2022 04:00 AM    Chloride 106 02/10/2022 04:00 AM    CO2 28 02/10/2022 04:00 AM    BUN 14 02/10/2022 04:00 AM    Creatinine 0.52 (L) 02/10/2022 04:00 AM    Calcium 9.3 02/10/2022 04:00 AM    Magnesium 1.3 (L) 02/09/2022 12:55 PM    Phosphorus 2.6 02/09/2022 05:40 PM      Lab Results   Component Value Date/Time    Alk.  phosphatase 33 (L) 02/09/2022 12:55 PM    Protein, total 3.7 (L) 02/09/2022 12:55 PM    Albumin 1.8 (L) 02/09/2022 12:55 PM    Globulin 1.9 (L) 02/09/2022 12:55 PM     Lab Results   Component Value Date/Time    INR 1.1 02/09/2022 12:55 PM    Prothrombin time 13.8 02/09/2022 12:55 PM    aPTT 37.8 (H) 02/09/2022 12:55 PM      No results found for: IRON, FE, TIBC, IBCT, PSAT, FERR   No results found for: PH, PCO2, PO2  No components found for: [place]   Lab Results   Component Value Date/Time     04/10/2017 06:30 PM    CK - MB 2.0 04/10/2017 06:30 PM              Total time: 30 minutes   Counseling / coordination time, spent as noted above:   > 50% counseling / coordination:  Time spent in direct consultation with the patient, medical team, and family     Prolonged service was provided for  []30 min   []75 min in face to face time in the presence of the patient, spent as noted above.  Time Start:   Time End:     Disclaimer: Sections of this note are dictated using utilizing voice recognition software, which may have resulted in some phonetic based errors in grammar and contents. Even though attempts were made to correct all the mistakes, some may have been missed, and remained in the body of the document. If questions arise, please contact our department.

## 2022-02-10 NOTE — PROGRESS NOTES
Palliative Medicine    Telephone call made to pt's niece/legal NOK Kesha Bates. She and her sister (pt's other niece) are agreeable to meet with our team tomorrow 2022 at 1030 to discuss goals of care. Pt's  just  on 22 and the other contact listed in her chart is her step-daughter Willard Oden and not a blood relative. This makes the pt's niece's her legal NOK. Thank you for the Palliative Medicine consult and allowing us to participate in the care of Mrs. Stacey Horton. Will continue to monitor and provide support.     Isela Roa RN, BSN  Palliative Medicine Inpatient RN  [facility]  Palliative [place] Line: [phone] ([phone])

## 2022-02-10 NOTE — ROUTINE PROCESS
Bedside and Verbal shift change report given to Ever Forde RN (oncoming nurse) by Surendra Conde RN   (offgoing nurse). Report included the following information SBAR, Kardex, Intake/Output, MAR and Recent Results.

## 2022-02-10 NOTE — PROGRESS NOTES
Ionized calcium noted to be low at 1.01 - check vitamin D and phosphorous levels. Begin oral and IV replacement.      Signed By: Gabriel Edmond NP     February 9, 2022

## 2022-02-10 NOTE — PROGRESS NOTES
Speech Pathology:     Pt currently at [facility]. Will complete evaluation at a later date/time or as medical condition permits.      Thank you for this referral.    Sweta Albarado M.S. CCC-SLP/L  Speech-Language Pathologist

## 2022-02-10 NOTE — PROGRESS NOTES
Problem: Dysphagia (Adult)  Goal: *Acute Goals and Plan of Care (Insert Text)  Description:     Patient will:  1. Tolerate PO trials with 0 s/s overt distress in 4/5 trials  2. Utilize compensatory swallow strategies/maneuvers (decrease bite/sip, size/rate, alt. liq/sol) with min cues in 4/5 trials  3. Perform oral-motor/laryngeal exercises to increase oropharyngeal swallow function with min cues  4. Complete an objective swallow study (i.e., MBSS) to assess swallow integrity, r/o aspiration, and determine of safest LRD, min A as indicated/ordered by MD     Rec:     Easy to chew with thin liquids  Aspiration precautions  HOB >45 during po intake, remain >30 for 30-45 minutes after po   Small bites/sips; alternate liquid/solid with slow feeding rate   Oral care TID  Meds per pt preference    Outcome: Progressing Towards Goal     SPEECH LANGUAGE PATHOLOGY BEDSIDE SWALLOW EVALUATION/TREATMENT    Patient: [facility] (12 y.o. female)  Date: 2/10/2022  Primary Diagnosis: Dysarthria [R47.1]  A-fib ([address] [I48.91]        Precautions:   Fall,Skin,Aspiration  PLOF: As per H&P    ASSESSMENT :  Based on the objective data described below, the patient presents with oropharyngeal swallow fxn essentially WFL. Pt extremely [place]; generalized weakness observed. Strength, ROM, and coordination of the orofacial musculature were all found to be [facility]. Pt tolerated reg solid, puree, and thin liquids +/- straw consecutive swallows without any overt s/sx of aspiration. Mastication was appropriate with timely a-p transit. Positive oral clearance observed across all trials. Laryngeal elevation was functional/timely to palpation with all PO trials. Speech production was fluent; no dysarthria observed. Expressive/receptive speech production appeared WFL to informal observation. Pt communicated in sentences of appropriate form, content, and use. Social conversation appropriate.  Pt safe for initiation of easy to chew diet with thin liquids, aspiration precautions, oral care TID, and meds as tolerated. TREATMENT :  Skilled therapy initiated; Educated pt on aspiration precautions and importance of compensatory swallow techniques to decrease aspiration risk (decrease rate of intake & sip/bite size, upright @HOB for all po intake and ~30 minutes after po); verbalized comprehension. ST will continue to follow x 1-2 visits to ensure safety of diet tolerance. Patient will benefit from skilled intervention to address the above impairments. Patient's rehabilitation potential is considered to be Good  Factors which may influence rehabilitation potential include:   []            None noted  [x]            Mental ability/status  [x]            Medical condition  []            Home/family situation and support systems  [x]            Safety awareness  []            Pain tolerance/management  []            Other:      PLAN :  Recommendations and Planned Interventions:  See above  Frequency/Duration: Patient will be followed by speech-language pathology x 1-2 more visits to address goals. Discharge Recommendations: [address], and To Be Determined     SUBJECTIVE:   Patient stated I have been thirsty this morning. Thank you. OBJECTIVE:     Past Medical History:   Diagnosis Date    Atypical angina ([address]     Bulging disc 9/21/2010    Cardiac catheterization 10/16/1990    Patent coronary arteries. Normal LV function. Cardiac echocardiogram 10/02/2012    EF 60%. No WMA. Mild-mod LAE. Cardiac nuclear imaging test, low risk 10/02/2012    No ischemia or prior infarction. EF 77%. No WMA. Low risk pharm stress test.  Profound vagal response to Lexiscan. Carotid duplex 01/25/2006    No significant stenosis bilaterally.     Dizziness     Dyspnea     Fecal incontinence     Follow-up exam 9/21/2010    Hematuria     Hepatitis B     Hip pain 4/13/2010    Hypertension     Mixed stress and urge urinary incontinence Overactive bladder     Spinal stenosis      Past Surgical History:   Procedure Laterality Date    CT ABD PELV W CONT      surgery for diverticulitis    HX APPENDECTOMY      HX BACK SURGERY      HX CHOLECYSTECTOMY      HX HEART CATHETERIZATION  10/16/90    HX ORTHOPAEDIC  [id number]    5 back surgeries    HX ORTHOPAEDIC  3/06    left rotator cuff repair    [place] BREAST SURGERY PROCEDURE UNLISTED      both breasts 3 times each     Prior Level of Function/Home Situation: see below  Home Situation  Home Environment: Private residence  # Steps to Enter: 3  One/Two Story Residence: One story  Living Alone: Yes  Support Systems: Other Family Member(s)  Patient Expects to be Discharged to[de-identified] Home  Current DME Used/Available at Home: Cane, straight,Walker, rolling    Diet prior to admission: regular solid with thin liquids  Current Diet:  easy to chew with thin liquids     Cognitive and Communication Status:  Neurologic State: Alert  Orientation Level: Oriented to person,Oriented to place  Cognition: Follows commands  Oral Assessment:  Oral Assessment  Labial: No impairment  Dentition: Natural;Intact  Oral Hygiene: adequate  Lingual: No impairment  Velum: No impairment  Mandible: No impairment  P.O. Trials:  Vocal quality prior to P.O.: No impairment  Consistency Presented: Thin liquid; Solid  How Presented: Self-fed/presented;Cup/sip;Spoon;Straw;Successive swallows  Bolus Acceptance: No impairment  Bolus Formation/Control: No impairment  Propulsion: No impairment  Oral Residue: None  Initiation of Swallow: No impairment  Laryngeal Elevation: Functional  Aspiration Signs/Symptoms: None  Pharyngeal Phase Characteristics: No impairment, issues, or problems   Effective Modifications: None  Cues for Modifications: None     Oral Phase Severity: No impairment  Pharyngeal Phase Severity : No impairment    PAIN:  Start of Eval: 0  End of Eval: 0     After treatment:   []            Patient left in no apparent distress sitting up in chair  [x]            Patient left in no apparent distress in bed  [x]            Call bell left within reach  [x]            Nursing notified  []            Family present  []            Caregiver present  []            Bed alarm activated    COMMUNICATION/EDUCATION:   [x]            Aspiration precautions; swallow safety; compensatory techniques. [x]            Patient/family have participated as able in goal setting and plan of care. []            Patient/family agree to work toward stated goals and plan of care. []            Patient understands intent and goals of therapy; neutral about participation. []            Patient unable to participate in goal setting/plan of care; educ ongoing with interdisciplinary staff  [x]         Posted safety precautions in patient's room.     Thank you for this referral.    Tali Fraga M.S. CCC-SLP/L  Speech-Language Pathologist

## 2022-02-10 NOTE — CONSULTS
A 80years old female patient with medical history of dementia, atrial fibrillation, and hypertension was brought to the hospital for a change in her speech which was witnessed by her family members. Patient is hard of hearing and with dementia; history from medical records. Currently, she complains of pain over the left mandibular area. She was noticed to have slurring of her speech for a couple of days. No facial drooping. No reported weakness of her extremities. Is not complaining of any headache. No abnormal movement. CT scan of the brain in the emergency room did not show any acute changes; no reported bony abnormalities. Has shown moderate atrophy and chronic small vessel changes. MRI of the brain did not show any acute stroke; moderately advanced volume loss was seen. Social History     Socioeconomic History    Marital status:      Spouse name: Not on file    Number of children: Not on file    Years of education: Not on file    Highest education level: Not on file   Occupational History    Not on file   Tobacco Use    Smoking status: Former Smoker     Packs/day: 1.00     Years: 6.00     Pack years: 6.00     Quit date: 1971     Years since quittin.1    Smokeless tobacco: Never Used   Substance and Sexual Activity    Alcohol use: Not Currently     Alcohol/week: 2.5 standard drinks     Types: 3 Glasses of wine per week    Drug use: No    Sexual activity: Not on file   Other Topics Concern    Not on file   Social History Narrative    Not on file     Social Determinants of Health     Financial Resource Strain:     Difficulty of Paying Living Expenses: Not on file   Food Insecurity:     Worried About Running Out of Food in the Last Year: Not on file    Etta of Food in the Last Year: Not on file   Transportation Needs:     Lack of Transportation (Medical): Not on file    Lack of Transportation (Non-Medical):  Not on file   Physical Activity:     Days of Exercise per Week: Not on file    Minutes of Exercise per Session: Not on file   Stress:     Feeling of Stress : Not on file   Social Connections:     Frequency of Communication with Friends and Family: Not on file    Frequency of Social Gatherings with Friends and Family: Not on file    Attends Restorationism Services: Not on file    Active Member of [address] [place] [address] or Organizations: Not on file    Attends Club or Organization Meetings: Not on file    Marital Status: Not on file   Intimate Partner Violence:     Fear of Current or Ex-Partner: Not on file    Emotionally Abused: Not on file    Physically Abused: Not on file    Sexually Abused: Not on file   Housing Stability:     Unable to Pay for Housing in the Last Year: Not on file    Number of [place] in the Last Year: Not on file    Unstable Housing in the Last Year: Not on file       Family History   Problem Relation Age of Onset    Other Mother         hx of heart disorder    Hypertension Mother     Stroke Mother     Other Father         hx of heart disorder    Hypertension Father     Diabetes Father     Cancer Other         Current Facility-Administered Medications   Medication Dose Route Frequency Provider Last Rate Last Admin    aspirin (ASA) suppository 300 mg  300 mg Rectal DAILY Sanya Bob MD        0.9% sodium chloride with KCl 40 mEq/L infusion   IntraVENous CONTINUOUS Gino DUBOIS, NP 75 mL/hr at 02/09/22 1000 [facility] at 02/09/22 1930    labetaloL (NORMODYNE;TRANDATE) 20 mg/4 mL (5 mg/mL) injection 5 mg  5 mg IntraVENous Q10MIN PRN Gino DUBOIS, NP        enoxaparin (LOVENOX) injection 40 mg  40 mg SubCUTAneous Q24H Gino DUBOIS, NP   40 mg at 02/09/22 2104    atorvastatin (LIPITOR) tablet 80 mg  80 mg Oral QHS Gino DUBOIS, NP        pantoprazole (PROTONIX) tablet 20 mg  20 mg Oral ACB Gino DUBOIS, NP        ondansetron (ZOFRAN) injection 4 mg  4 mg IntraVENous Q6H PRN DESIREE Ferrer acetaminophen (TYLENOL) tablet 650 mg  650 mg Oral Q4H PRN Tony Pan NP        calcium carbonate (TUMS) chewable tablet 200 mg [elemental]  200 mg Oral TID WITH MEALS Tony Pan NP           Past Medical History:   Diagnosis Date    Atypical angina ([address]     Bulging disc 9/21/2010    Cardiac catheterization 10/16/1990    Patent coronary arteries. Normal LV function.  Cardiac echocardiogram 10/02/2012    EF 60%. No WMA. Mild-mod LAE.  Cardiac nuclear imaging test, low risk 10/02/2012    No ischemia or prior infarction. EF 77%. No WMA. Low risk pharm stress test.  Profound vagal response to Lexiscan.  Carotid duplex 01/25/2006    No significant stenosis bilaterally.     Dizziness     Dyspnea     Fecal incontinence     Follow-up exam 9/21/2010    Hematuria     Hepatitis B     Hip pain 4/13/2010    Hypertension     Mixed stress and urge urinary incontinence     Overactive bladder     Spinal stenosis        Past Surgical History:   Procedure Laterality Date    CT ABD PELV W CONT      surgery for diverticulitis    HX APPENDECTOMY      HX BACK SURGERY      HX CHOLECYSTECTOMY      HX HEART CATHETERIZATION  10/16/90    HX ORTHOPAEDIC  [id number]    5 back surgeries    HX ORTHOPAEDIC  3/06    left rotator cuff repair    DE BREAST SURGERY PROCEDURE UNLISTED      both breasts 3 times each       Allergies   Allergen Reactions    Iodinated Contrast Media Swelling     Swollen all over with welps       Patient Active Problem List   Diagnosis Code    Follow-up exam Z09    Hip pain M25.559    Bulging disc [id number]    S/P cardiac catheterization Z98.890    Hepatitis K75.9    Dizziness and vertigo in the setting of known labyrinthine disease, possible Ménière's disease R42    Spinal stenosis M48.00    Dyspnea on exertion and atypical anginal chest discomfort in the past, without recurrence R06.00    HTN (hypertension) I10    OA (osteoarthritis) M19.90    Spinal stenosis M48.00    Urinary incontinence R32    Dementia ([facility] F03.90    Chest pain, unspecified R07.9    A-fib ([facility]) I48.91    Dysarthria R47.1         Review of Systems:   Unable to obtain due to her clinical condition. PHYSICAL EXAMINATION:      VITAL SIGNS:    Visit Vitals  BP (!) 166/70 (BP 1 Location: Right upper arm, BP Patient Position: Lying)   Pulse 88   Temp 98.2 °F (36.8 °C)   Resp 16   SpO2 99%   Breastfeeding No       GENERAL: In no apparent distress. EXTREMITIES: Muscle tone is normal.  HEAD:   Tenderness and slight swelling over the mandibular and maxillary area. No visible rash. NEUROLOGIC EXAMINATION  Mental status: Awake, alert, follows simple commands, no gaze deviation. Speech and languge: fluent, coherent,  and comprehension intact  CN:  EOMI, PERRLA, face sensation intact , no facial asymmetry noted, palate elevation symmetric bilat, , tongue midline  Motor: no pronator drift, tone normal throughout, symmetric movement of the upper and lower extremities  Sensory: intact to light touch throughout  DTR: 2+ throughout except at the ankles  Gait: not tested       Study Result    Narrative & Impression   EXAM: MRI of the Head without contrast     INDICATION: rule out cva dysarthria.     TECHNIQUE: MRI of the head without IV contrast. Multiplanar multisequence MR  images of the brain without contrast.      IV contrast:  None     COMPARISON: Head CT dated 2/9/2021     FINDINGS: No focus of restricted diffusion appreciated. The ventricles and sulci  are symmetric but moderately enlarged. Moderately extensive white matter  hyperintensities on FLAIR weighted imaging are noted in the periventricular and  subcortical white matter. No midline shift, mass effect, or mass lesion  appreciated. The grey-white junction is intact. No evidence for an acute  infarct identified. No focus of abnormal susceptibility appreciated. The  vascular flow voids are intact.  The cerebellopontine angles are unremarkable. The visualized internal auditory canals and semicircular canals are  unremarkable. The pituitary is normal. The mastoid air cells are unremarkable. The visualized paranasal sinuses are unremarkable. The orbits are unremarkable. The scalp and skull are unremarkable.     IMPRESSION  1. No acute intracranial process.     2. Moderately extensive parenchymal volume loss and chronic small vessel  ischemic changes. CBC:   Lab Results   Component Value Date/Time    WBC 14.6 (H) 02/10/2022 04:00 AM    RBC 3.31 (L) 02/10/2022 04:00 AM    HGB 10.9 (L) 02/10/2022 04:00 AM    HCT 35.1 02/10/2022 04:00 AM    PLATELET 482 (H) 67/42/3588 04:00 AM     BMP:   Lab Results   Component Value Date/Time    Glucose 110 (H) 02/10/2022 04:00 AM    Sodium 138 02/10/2022 04:00 AM    Potassium 4.6 02/10/2022 04:00 AM    Chloride 106 02/10/2022 04:00 AM    CO2 28 02/10/2022 04:00 AM    BUN 14 02/10/2022 04:00 AM    Creatinine 0.52 (L) 02/10/2022 04:00 AM    Calcium 9.3 02/10/2022 04:00 AM     CMP:   Lab Results   Component Value Date/Time    Glucose 110 (H) 02/10/2022 04:00 AM    Sodium 138 02/10/2022 04:00 AM    Potassium 4.6 02/10/2022 04:00 AM    Chloride 106 02/10/2022 04:00 AM    CO2 28 02/10/2022 04:00 AM    BUN 14 02/10/2022 04:00 AM    Creatinine 0.52 (L) 02/10/2022 04:00 AM    Calcium 9.3 02/10/2022 04:00 AM    Anion gap 4 02/10/2022 04:00 AM    BUN/Creatinine ratio 27 (H) 02/10/2022 04:00 AM    Alk.  phosphatase 33 (L) 02/09/2022 12:55 PM    Protein, total 3.7 (L) 02/09/2022 12:55 PM    Albumin 1.8 (L) 02/09/2022 12:55 PM    Globulin 1.9 (L) 02/09/2022 12:55 PM    A-G Ratio 0.9 02/09/2022 12:55 PM     Coagulation:   Lab Results   Component Value Date/Time    Prothrombin time 13.8 02/09/2022 12:55 PM    INR 1.1 02/09/2022 12:55 PM    aPTT 37.8 (H) 02/09/2022 12:55 PM     Impression:   A 72-year-old male patient with above medical problems including atrial fibrillation not on anticoagulation [takes aspirin 81 mg p.o. per day] was brought to the emergency room for a change in her speech witnessed by her family members. Had some slurring. Patient also complains of left-sided jaw pain. On exam left TMJ area and maxillary area feels tender and slightly swollen. No obvious weakness of her extremities. CT scan of the brain did not show any acute changes. MRI also did not show any stroke; has shown moderate to excessive volume loss and microvascular ischemic disease. With changes in speech this patient could be from the pain at the TMJ; other possibility will be a TIA but has been going on for couple of days and MRI is negative. Lipid panel is normal and normal hemoglobin A1c. Plan:   -Continue with aspirin  -Decrease the dose of atorvastatin to 20 mg p.o. per day; can be discontinued home follow-up as she has a normal lipid panel  -TTE and carotid Doppler ultrasound and will follow the result  -Work-up and treatment of the left TMJ area tenderness and slight swelling by the primary team  -Call for questions  -We will follow patient as needed. PLEASE NOTE:   This document has been produced using voice recognition software. Unrecognized errors in transcription may be present.

## 2022-02-10 NOTE — CONSULTS
Comprehensive Nutrition Assessment    Type and Reason for Visit: Initial,Positive nutrition screen,Consult    Nutrition Recommendations/Plan:   - Add supplement: Ensure enlive once daily  - Continue all other nutrition interventions. Encourage/ monitor po intake of meals and supplements. Nutrition Assessment:  Pt with dementia and hearing difficulty. She could not accurately recall if she ate her lunch today; pt reported that she did not eat lunch, but per CNA, pt finished 85% of it. Is on easy to chew diet per SLP. No po intake x 2 days PTA per family report per CNA. Noted wt loss over last several years PTA per chart hx. Noted Palliative following to address plan of care goals    Malnutrition Assessment:  Malnutrition Status: At risk for malnutrition (specify) (wt loss PTA per chart hx.)      Nutrition History and Allergies: Past medical hx:  Dementia, HTN, appendectomy, cholecystectomy, diverticulitis. Wt trends PTA per chart hx:  163 lb on 8/15/2011,   156 lb on 3/16/2017,  141 lb on 8/8/2017,   135 lb on 12/21/2017,    123 lb on 7/23/2019,   118 lb on 11/16/2021,   118 lb on 2/10/2022. Net wt loss of 45 lb, 27.6% x 10.5 years PTA per chart hx. No po intake x 2 days PTA per family report per CNA report. No known food allergies     Estimated Daily Nutrient Needs:  Energy (kcal): 4595-8248; Weight Used for Energy Requirements: Admission (53.5 kg)  Protein (g): 54-70; Weight Used for Protein Requirements: Admission (x1-1.3)  Fluid (ml/day): 0382-9993; Method Used for Fluid Requirements: 1 ml/kcal      Nutrition Related Findings:  BM 2/10 per RN. No edema. IVF, NS with KCl 40 mEq/L at 75 mL/hr (72 mEq KCl per day). Pertinent meds:  Tums, protonix,  30 mEq KCl given this morning between 12 AM and 1 PM prior to 4 AM labs due to low K level on 2/9.       Wounds:    None       Current Nutrition Therapies:  ADULT DIET Easy to Chew    Anthropometric Measures:  · Height:  5' 5\" (165.1 cm)  · Current Body Wt:  53.5 kg (117 lb 15.1 oz)   · Admission Body Wt:  117 lb 15.1 oz    · Usual Body Wt:   (118 lb on 11/16/2021 per chart hx)     · Ideal Body Wt:  125 lbs:  94.4 %   · Adjusted Body Weight:   ; Weight Adjustment for: No adjustment   · BMI Category:  Underweight (BMI less than 22) age over 72       Nutrition Diagnosis:   · Unintended weight loss related to inadequate protein-energy intake as evidenced by weight loss (-27.6% x 10.5 year PTA per chart hx)      Nutrition Interventions:   Food and/or Nutrient Delivery: Continue current diet,Mineral supplement,Start oral nutrition supplement  Nutrition Education and Counseling: No recommendations at this time,Education not indicated (hard of hearing)  Coordination of Nutrition Care: Continue to monitor while inpatient,Feeding assistance/environmental change,Swallow evaluation,Speech therapy    Goals:  PO nutrition intake will meet >75% of patient's estimated nutrition needs within the next follow up date       Nutrition Monitoring and Evaluation:   Behavioral-Environmental Outcomes: None identified  Food/Nutrient Intake Outcomes: Diet advancement/tolerance,Food and nutrient intake,Supplement intake,Vitamin/mineral intake  Physical Signs/Symptoms Outcomes: Biochemical data,Chewing or swallowing,Meal time behavior    Discharge Planning:     Too soon to determine     Electronically signed by Urbano Lima RD on 2/10/2022 at 4:11 PM    Contact: [phone]

## 2022-02-10 NOTE — PROGRESS NOTES
Palliative Medicine    Palliative Medicine team attempted to see pt and was MATT for testing. Will follow up as time permits. Thank you for the Palliative Medicine consult and allowing us to participate in the care of Mrs. Ezequiel Hernandes. Will continue to monitor and provide support.     Aung Schaffer RN, BSN  Palliative Medicine Inpatient RN   [facility]  Palliative [facility] Line: [phone] ([phone])

## 2022-02-10 NOTE — PROGRESS NOTES
Problem: Self Care Deficits Care Plan (Adult)  Goal: *Acute Goals and Plan of Care (Insert Text)  Description: Occupational Therapy Goals  Initiated 2/10/2022 within 7 day(s). 1.  Patient will perform grooming with modified independence. 2.  Patient will perform bathing with modified independence with good safety awareness. 3.  Patient will perform upper body dressing and lower body dressing with modified independence with good safety awareness. 4.  Patient will perform toilet transfers with modified independence using LRAD. 5.  Patient will perform all aspects of toileting with modified independence. 6.  Patient will participate in upper extremity therapeutic exercise/activities with modified independence for 8 minutes. 7.  Patient will utilize energy conservation techniques during functional activities with min verbal cues. Prior Level of Function: Mod I with ADLs and functional mobility using SPC in home and RW in community, nieces and dtr-in-law assist as needed e.g. homemaking tasks, laundry is in garage with steps     2/10/2022 1350 by [facility]  Outcome: Progressing Towards Goal     OCCUPATIONAL THERAPY EVALUATION    Patient: Hermes Metzger (42 y.o. female)  Date: 2/10/2022  Primary Diagnosis: Dysarthria [R47.1]  A-fib ([address] [I48.91]        Precautions:   Fall,Skin,Aspiration  PLOF: Mod I with ADLs and functional mobility using SPC in home and RW in community, nieces and dtr-in-law assist as needed e.g. homemaking tasks, laundry is in garage with steps    ASSESSMENT :  Nursing/RN cleared for pt to participate in OT evaluation and tx session. Patient presents lying semi-reclined in bed, napping, easily awakens to verbal stimuli, reports fatigue and weakness, states spouse passed away four days ago (niece later confirmed as correct). Bed mobility: supine <-> sit edge of bed, able to scoot lateral to left seated edge of bed.  Following sitting edge of bed Vitals: O2 @  LPM: %, HR , BP -  nursing notified. Patient resting comfortably lying semi recliner in bed, call bell within reach & pt verbalized understanding and provided return demonstration to utilize for assist e.g. functional transfers in order to prevent falls. Patient will benefit from skilled intervention to address the above impairments. Patient's rehabilitation potential is considered to be Good  Factors which may influence rehabilitation potential include:   []             None noted  [x]             Mental ability/status  [x]             Medical condition  []             Home/family situation and support systems  [x]             Safety awareness  []             Pain tolerance/management  []             Other:      PLAN :  Recommendations and Planned Interventions:   [x]               Self Care Training                  [x]      Therapeutic Activities  [x]               Functional Mobility Training   [x]      Cognitive Retraining  [x]               Therapeutic Exercises           [x]      Endurance Activities  [x]               Balance Training                    [x]      Neuromuscular Re-Education  []               Visual/Perceptual Training     [x]      Home Safety Training  [x]               Patient Education                   [x]      Family Training/Education  []               Other (comment):    Frequency/Duration: Patient will be followed by occupational therapy 3-5 times a week to address goals. Discharge Recommendations: [facility] versus Home Health with 24/7 supv and assist e.g. ADLs  Further Equipment Recommendations for Discharge: patient has all recommended DME     SUBJECTIVE:   Patient stated I use my cane.     OBJECTIVE DATA SUMMARY:     Past Medical History:   Diagnosis Date    Atypical angina ([address]     Bulging disc 9/21/2010    Cardiac catheterization 10/16/1990    Patent coronary arteries. Normal LV function. Cardiac echocardiogram 10/02/2012    EF 60%. No WMA. Mild-mod LAE.       Cardiac nuclear imaging test, low risk 10/02/2012    No ischemia or prior infarction. EF 77%. No WMA. Low risk pharm stress test.  Profound vagal response to Lexiscan. Carotid duplex 01/25/2006    No significant stenosis bilaterally. Dizziness     Dyspnea     Fecal incontinence     Follow-up exam 9/21/2010    Hematuria     Hepatitis B     Hip pain 4/13/2010    Hypertension     Mixed stress and urge urinary incontinence     Overactive bladder     Spinal stenosis      Past Surgical History:   Procedure Laterality Date    CT ABD PELV W CONT      surgery for diverticulitis    HX APPENDECTOMY      HX BACK SURGERY      HX CHOLECYSTECTOMY      HX HEART CATHETERIZATION  10/16/90    HX ORTHOPAEDIC  0410-2381    5 back surgeries    HX ORTHOPAEDIC  3/06    left rotator cuff repair    IN BREAST SURGERY PROCEDURE UNLISTED      both breasts 3 times each     Barriers to Learning/Limitations: yes;  cognitive, sensory deficits-vision/hearing/speech, and altered mental status (i.e.Sedation, Confusion)  Compensate with: visual, verbal, tactile, kinesthetic cues/model    Home Situation:   Home Situation  Home Environment: Private residence  # Steps to Enter: 3  One/Two Story Residence: One story  Living Alone: Yes  Support Systems: Other Family Member(s)  Patient Expects to be Discharged to[de-identified] Home  Current DME Used/Available at Home: Janusz Dino, rolling,Cane, straight,Shower chair,Grab bars,Raised toilet seat  Tub or Shower Type: Tub/Shower combination  []  Right hand dominant   []  Left hand dominant    Cognitive/Behavioral Status:  Neurologic State: Alert  Orientation Level: Oriented to place;Oriented to person  Cognition: Follows commands  Safety/Judgement: Fall prevention    Skin: appears intact  Edema: none noted    Vision/Perceptual:  appears intact       Coordination: BUE  Coordination: Within functional limits  Fine Motor Skills-Upper: Left Intact; Right Intact    Gross Motor Skills-Upper: Left Intact; Right Intact    Balance:  Sitting: Intact  Standing: Impaired  Standing - Static: Good  Standing - Dynamic : Fair    Strength: BUE  Strength: Within functional limits     Tone & Sensation: BUE  Tone: Normal     Range of Motion: BUE  AROM: Within functional limits     Functional Mobility and Transfers for ADLs:  Bed Mobility:     Supine to Sit: Supervision  Sit to Supine: Supervision     Transfers:  Sit to Stand: Supervision  Stand to Sit: Supervision      Toilet Transfer : Stand-by assistance      ADL Assessment:   Feeding: Modified independent    Oral Facial Hygiene/Grooming: Stand-by assistance    Bathing: Stand-by assistance    Upper Body Dressing: Stand-by assistance    Lower Body Dressing: Stand-by assistance    Toileting: Stand by assistance     ADL Intervention:  Grooming  Position Performed: Standing  Washing Hands: Stand-by assistance    Lower Body Dressing Assistance  Socks: Stand-by assistance (simulation)  Leg Crossed Method Used: Yes  Position Performed: Seated edge of bed    Toileting  Toileting Assistance: Stand-by assistance  Bladder Hygiene: Stand-by assistance  Bowel Hygiene: Stand-by assistance    Cognitive Retraining  Safety/Judgement: Fall prevention    Pain:  Pain level pre-treatment: 0/10   Pain level post-treatment: 0/10     Activity Tolerance:   fair  Please refer to the flowsheet for vital signs taken during this treatment. After treatment:   [] Patient left in no apparent distress sitting up in chair  [x] Patient left in no apparent distress in bed  [x] Call bell left within reach  [x] Nursing notified  [] Caregiver present  [x] Bed alarm activated    COMMUNICATION/EDUCATION:   [x] Role of Occupational Therapy in the acute care setting  [x] Home safety education was provided and the patient/caregiver indicated understanding. [x] Patient/family have participated as able in goal setting and plan of care. [x] Patient/family agree to work toward stated goals and plan of care.   [] Patient understands intent and goals of therapy, but is neutral about his/her participation. [] Patient is unable to participate in goal setting and plan of care. Thank you for this referral.  Katarzyna Sutherland  Time Calculation: 24 mins    Eval Complexity: History: MEDIUM Complexity : Expanded review of history including physical, cognitive and psychosocial  history ; Examination: MEDIUM Complexity : 3-5 performance deficits relating to physical, cognitive , or psychosocial skils that result in activity limitations and / or participation restrictions; Decision Making:MEDIUM Complexity : Patient may present with comorbidities that affect occupational performnce.  Miniml to moderate modification of tasks or assistance (eg, physical or verbal ) with assesment(s) is necessary to enable patient to complete evaluation

## 2022-02-10 NOTE — PROGRESS NOTES
Problem: Mobility Impaired (Adult and Pediatric)  Goal: *Acute Goals and Plan of Care (Insert Text)  Description: Physical Therapy Goals  Initiated 2/10/2022 and to be accomplished within 7 day(s)  1. Patient will move from supine to sit and sit to supine in bed with modified independence. 2.  Patient will transfer from bed to chair and chair to bed with modified independence using the least restrictive device. 3.  Patient will perform sit to stand with modified independence. 4.  Patient will ambulate with modified independence for 50 feet with the least restrictive device. 5.  Patient will ascend/descend 3 stairs with handrail(s) with supervision/set-up. PLOF: Mod I with cane. Lives alone. 3 steps to enter home; 4 steps into garage for laundry. Outcome: Progressing Towards Goal   PHYSICAL THERAPY EVALUATION    Patient: Helena Will (73 y.o. female)  Date: 2/10/2022  Primary Diagnosis: Dysarthria [R47.1]  A-fib ([facility] [address] [I48.91]  Precautions: Fall,Skin,Aspiration  ASSESSMENT :  Oriented to self and place. Hard of hearing. Decreased safety awareness. Supervision for supine to sit. Seated EOB with good balance. Supervision for sit to stand. Amb 15ft x2 with personal cane and supervision. Min A at times d/t safety awareness. Performed dynamic balance tasks in bathroom with OT. Returned to seated at EOB. Supervision for sit to supine. Sorayaece assists with PLOF. Educated on need for RN assistance with mobility; verbalized understanding. Call bell in reach. Recommend 24/7 supervision at home d/t safety concerns. Would benefit from life alert d/t increased risk of falls. Patient will benefit from skilled intervention to address the above impairments.   Patient's rehabilitation potential is considered to be Fair  Factors which may influence rehabilitation potential include:   []         None noted  []         Mental ability/status  [x]         Medical condition  []         Home/family situation and support systems  []         Safety awareness  []         Pain tolerance/management  []         Other:      PLAN :  Recommendations and Planned Interventions:   [x]           Bed Mobility Training             [x]    Neuromuscular Re-Education  [x]           Transfer Training                   []    Orthotic/Prosthetic Training  [x]           Gait Training                          []    Modalities  [x]           Therapeutic Exercises           []    Edema Management/Control  [x]           Therapeutic Activities            [x]    Family Training/Education  [x]           Patient Education  []           Other (comment):    Frequency/Duration: Patient will be followed by physical therapy 3-5 times a week to address goals. Discharge Recommendations: Home Health with  supervision  Further Equipment Recommendations for Discharge: straight cane     SUBJECTIVE:   Patient stated My   4 days ago.     OBJECTIVE DATA SUMMARY:     Past Medical History:   Diagnosis Date    [facility]typ[facility] angina ([facility] [address]     Bulging disc 2010    Cardiac catheterization 10/16/1990    Patent coronary arteries. Normal LV function.  Cardiac echocardiogram 10/02/2012    EF 60%. No WMA. Mild-mod LAE.  Cardiac nuclear imaging test, low risk 10/02/2012    No ischemia or prior infarction. EF 77%. No WMA. Low risk pharm stress test.  Profound vagal response to Lexiscan.  Carotid duplex 2006    No significant stenosis bilaterally.     Dizziness     Dyspnea     Fecal incontinence     Follow-up exam 2010    Hematuria     Hepatitis B     Hip pain 2010    Hypertension     Mixed stress and urge urinary incontinence     Overactive bladder     Spinal stenosis      Past Surgical History:   Procedure Laterality Date    CT ABD PELV W CONT      surgery for diverticulitis    HX APPENDECTOMY      HX BACK SURGERY      HX CHOLECYSTECTOMY      HX HEART CATHETERIZATION  10/16/90    HX ORTHOPAEDIC  [id number]    5 back surgeries    HX ORTHOPAEDIC  3/06    left rotator cuff repair    SD BREAST SURGERY PROCEDURE UNLISTED      both breasts 3 times each     Barriers to Learning/Limitations: yes;  cognitive and sensory deficits-vision/hearing/speech  Compensate with: Visual Cues, Verbal Cues, Tactile Cues and Kinesthetic Cues    Home Situation:  Home Situation  Home Environment: Private residence  # Steps to Enter: 3  One/Two Story Residence: One story  Living Alone: Yes  Support Systems: Other Family Member(s)  Patient Expects to be Discharged to[de-identified] Home  Current DME Used/Available at Home: Cane, straight,Walker, rolling    Critical Behavior:  Neurologic State: Alert  Orientation Level: Oriented to person;Oriented to place  Cognition: Follows commands     Psychosocial  Patient Behaviors: Cooperative    Strength:    Manual Muscle Testing (LE)         R     L    Hip Flexion:   4/5 4/5  Knee EXT:   4/5 4/5  Knee FLEX:   4/5 4/5  Ankle DF:   4/5 4/5  _________________________________________________   Range Of Motion:  BLE AROM WFL   Functional Mobility:  Bed Mobility:  Supine to Sit: Supervision  Sit to Supine: Supervision  Transfers:  Sit to Stand: Supervision  Stand to Sit: Supervision  Balance:   Sitting: Intact  Standing: Impaired  Standing - Static: Good  Standing - Dynamic : Fair  Ambulation/Gait Training:  Distance (ft):  (15ftx2)   Assistive Device: Cane, straight  Ambulation - Level of Assistance: Supervision;Minimal assistance  Neuro Re-education:  Seated balance 10 minutes  Reaching tasks 10 minutes  Therapeutic Exercises:   Sit to stand x2  Pain:  Pain level pre-treatment: 0/10   Pain level post-treatment: 0/10     Activity Tolerance:   Good    After treatment:   []         Patient left in no apparent distress sitting up in chair  [x]         Patient left in no apparent distress in bed  [x]         Call bell left within reach  [x]         Nursing notified  []         Caregiver present  [x]         Bed alarm activated  []         SCDs applied    COMMUNICATION/EDUCATION:   [x]         Role of physical therapy and plan of care in the acute care setting. [x]         Fall prevention education was provided and the patient/caregiver indicated understanding. [x]         Patient/family have participated as able in goal setting and plan of care. []         Patient/family agree to work toward stated goals and plan of care. []         Patient understands intent and goals of therapy, but is neutral about his/her participation. []         Patient is unable to participate in goal setting/plan of care: ongoing with therapy staff.     Thank you for this referral.  Jeffrey Chatterjee, PT   Time Calculation: 23 mins    Eval Complexity: History: MEDIUM  Complexity : 1-2 comorbidities / personal factors will impact the outcome/ POC Exam:MEDIUM Complexity : 3 Standardized tests and measures addressing body structure, function, activity limitation and / or participation in recreation  Presentation: MEDIUM Complexity : Evolving with changing characteristics  Clinical Decision Making:Medium Complexity   Clinical judgement; ROM, MMT, functional mobility Overall Complexity:MEDIUM

## 2022-02-10 NOTE — PROGRESS NOTES
conducted an initial consultation and Spiritual Assessment for Jimbo Alvarez, who is a 80 y.o.,female. Patients Primary Language is: [place]. According to the patients EMR Sabianist Affiliation is: [place].     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 Listened empathically. Offered prayer and assurance of continued prayers on patient's behalf. Chart reviewed. The following outcomes where achieved:  Patient shared limited information about both their medical narrative and spiritual journey/beliefs. Patient processed feeling about current hospitalization. Patient expressed gratitude for 's visit. Assessment:  Patient does not have any Cheondoism/cultural needs that will affect patients preferences in health care. There are no spiritual or Cheondoism issues which require intervention at this time. Plan:  Chaplains will continue to follow and will provide pastoral care on an as needed/requested basis.  recommends bedside caregivers page  on duty if patient shows signs of acute spiritual or emotional distress.       [facility]   [phone]

## 2022-02-11 LAB
ANION GAP SERPL CALC-SCNC: 5 MMOL/L (ref 3–18)
BASOPHILS # BLD: 0.1 K/UL (ref 0–0.1)
BASOPHILS NFR BLD: 1 % (ref 0–2)
BUN SERPL-MCNC: 18 MG/DL (ref 7–18)
BUN/CREAT SERPL: 31 (ref 12–20)
CALCIUM SERPL-MCNC: 9 MG/DL (ref 8.5–10.1)
CHLORIDE SERPL-SCNC: 103 MMOL/L (ref 100–111)
CO2 SERPL-SCNC: 28 MMOL/L (ref 21–32)
CREAT SERPL-MCNC: 0.59 MG/DL (ref 0.6–1.3)
DIFFERENTIAL METHOD BLD: ABNORMAL
EOSINOPHIL # BLD: 0.1 K/UL (ref 0–0.4)
EOSINOPHIL NFR BLD: 1 % (ref 0–5)
ERYTHROCYTE [DISTWIDTH] IN BLOOD BY AUTOMATED COUNT: 14.4 % (ref 11.6–14.5)
GLUCOSE BLD STRIP.AUTO-MCNC: 155 MG/DL (ref 70–110)
GLUCOSE BLD STRIP.AUTO-MCNC: 166 MG/DL (ref 70–110)
GLUCOSE SERPL-MCNC: 104 MG/DL (ref 74–99)
HCT VFR BLD AUTO: 35.7 % (ref 35–45)
HGB BLD-MCNC: 11.5 G/DL (ref 12–16)
IMM GRANULOCYTES # BLD AUTO: 0.1 K/UL (ref 0–0.04)
IMM GRANULOCYTES NFR BLD AUTO: 1 % (ref 0–0.5)
LEFT CCA DIST DIAS: 10.6 CM/S
LEFT CCA DIST SYS: 71 CM/S
LEFT CCA MID DIAS: 9.84 CM/S
LEFT CCA MID SYS: 69.37 CM/S
LEFT CCA PROX DIAS: 9.8 CM/S
LEFT CCA PROX SYS: 87.5 CM/S
LEFT ECA DIAS: 5.05 CM/S
LEFT ECA SYS: 63.3 CM/S
LEFT ICA DIST DIAS: 10.6 CM/S
LEFT ICA DIST SYS: 68.8 CM/S
LEFT ICA MID DIAS: 10.6 CM/S
LEFT ICA MID SYS: 66.6 CM/S
LEFT ICA PROX DIAS: 8.4 CM/S
LEFT ICA PROX SYS: 67.7 CM/S
LEFT ICA/CCA SYS: 0.97
LEFT VERTEBRAL DIAS: 8.02 CM/S
LEFT VERTEBRAL SYS: 57.5 CM/S
LYMPHOCYTES # BLD: 3.6 K/UL (ref 0.9–3.6)
LYMPHOCYTES NFR BLD: 34 % (ref 21–52)
MCH RBC QN AUTO: 34.4 PG (ref 24–34)
MCHC RBC AUTO-ENTMCNC: 32.2 G/DL (ref 31–37)
MCV RBC AUTO: 106.9 FL (ref 78–100)
MONOCYTES # BLD: 0.8 K/UL (ref 0.05–1.2)
MONOCYTES NFR BLD: 7 % (ref 3–10)
NEUTS SEG # BLD: 6 K/UL (ref 1.8–8)
NEUTS SEG NFR BLD: 57 % (ref 40–73)
NRBC # BLD: 0 K/UL (ref 0–0.01)
NRBC BLD-RTO: 0 PER 100 WBC
PLATELET # BLD AUTO: 629 K/UL (ref 135–420)
PMV BLD AUTO: 9.2 FL (ref 9.2–11.8)
POTASSIUM SERPL-SCNC: 4.2 MMOL/L (ref 3.5–5.5)
RBC # BLD AUTO: 3.34 M/UL (ref 4.2–5.3)
RIGHT CCA DIST DIAS: 5.4 CM/S
RIGHT CCA DIST SYS: 60.9 CM/S
RIGHT CCA MID DIAS: 4.7 CM/S
RIGHT CCA MID SYS: 63.06 CM/S
RIGHT CCA PROX DIAS: 4 CM/S
RIGHT CCA PROX SYS: 51.7 CM/S
RIGHT ECA DIAS: 0 CM/S
RIGHT ECA SYS: 64.2 CM/S
RIGHT ICA DIST DIAS: 12.4 CM/S
RIGHT ICA DIST SYS: 86.2 CM/S
RIGHT ICA MID DIAS: 10.1 CM/S
RIGHT ICA MID SYS: 67.7 CM/S
RIGHT ICA PROX DIAS: 6.8 CM/S
RIGHT ICA PROX SYS: 49.5 CM/S
RIGHT ICA/CCA SYS: 1.4
RIGHT VERTEBRAL DIAS: 8.37 CM/S
RIGHT VERTEBRAL SYS: 56.4 CM/S
SODIUM SERPL-SCNC: 136 MMOL/L (ref 136–145)
VAS LEFT SUBCLAVIAN PROX EDV: 0 CM/S
VAS LEFT SUBCLAVIAN PROX PSV: 127.9 CM/S
VAS RIGHT SUBCLAVIAN PROX EDV: 0 CM/S
VAS RIGHT SUBCLAVIAN PROX PSV: 110.2 CM/S
WBC # BLD AUTO: 10.6 K/UL (ref 4.6–13.2)

## 2022-02-11 PROCEDURE — 97116 GAIT TRAINING THERAPY: CPT

## 2022-02-11 PROCEDURE — 85025 COMPLETE CBC W/AUTO DIFF WBC: CPT

## 2022-02-11 PROCEDURE — 99233 SBSQ HOSP IP/OBS HIGH 50: CPT | Performed by: NURSE PRACTITIONER

## 2022-02-11 PROCEDURE — 99232 SBSQ HOSP IP/OBS MODERATE 35: CPT | Performed by: EMERGENCY MEDICINE

## 2022-02-11 PROCEDURE — 97110 THERAPEUTIC EXERCISES: CPT

## 2022-02-11 PROCEDURE — 82962 GLUCOSE BLOOD TEST: CPT

## 2022-02-11 PROCEDURE — 74011250636 HC RX REV CODE- 250/636: Performed by: NURSE PRACTITIONER

## 2022-02-11 PROCEDURE — 36415 COLL VENOUS BLD VENIPUNCTURE: CPT

## 2022-02-11 PROCEDURE — 97535 SELF CARE MNGMENT TRAINING: CPT

## 2022-02-11 PROCEDURE — 80048 BASIC METABOLIC PNL TOTAL CA: CPT

## 2022-02-11 PROCEDURE — 92526 ORAL FUNCTION THERAPY: CPT

## 2022-02-11 PROCEDURE — 65660000000 HC RM CCU STEPDOWN

## 2022-02-11 PROCEDURE — 74011250637 HC RX REV CODE- 250/637: Performed by: EMERGENCY MEDICINE

## 2022-02-11 PROCEDURE — 74011250637 HC RX REV CODE- 250/637: Performed by: NURSE PRACTITIONER

## 2022-02-11 RX ORDER — AMLODIPINE BESYLATE 2.5 MG/1
2.5 TABLET ORAL DAILY
Status: DISCONTINUED | OUTPATIENT
Start: 2022-02-11 | End: 2022-02-13 | Stop reason: HOSPADM

## 2022-02-11 RX ADMIN — ATORVASTATIN CALCIUM 20 MG: 20 TABLET, FILM COATED ORAL at 22:40

## 2022-02-11 RX ADMIN — ASPIRIN 81 MG CHEWABLE TABLET 81 MG: 81 TABLET CHEWABLE at 08:28

## 2022-02-11 RX ADMIN — CALCIUM CARBONATE (ANTACID) CHEW TAB 500 MG 200 MG: 500 CHEW TAB at 12:20

## 2022-02-11 RX ADMIN — AMLODIPINE BESYLATE 2.5 MG: 2.5 TABLET ORAL at 12:20

## 2022-02-11 RX ADMIN — PANTOPRAZOLE SODIUM 20 MG: 20 TABLET, DELAYED RELEASE ORAL at 08:28

## 2022-02-11 RX ADMIN — CALCIUM CARBONATE (ANTACID) CHEW TAB 500 MG 200 MG: 500 CHEW TAB at 08:28

## 2022-02-11 RX ADMIN — ENOXAPARIN SODIUM 40 MG: 100 INJECTION SUBCUTANEOUS at 16:53

## 2022-02-11 RX ADMIN — CALCIUM CARBONATE (ANTACID) CHEW TAB 500 MG 200 MG: 500 CHEW TAB at 16:53

## 2022-02-11 NOTE — PROGRESS NOTES
[facility]: [phone])  [facility]: [phone]    Goals of care defined. MPOA are agreeable to completion a POST form for DDNR, limited medical interventions, NO Intubation, and NO feeding tubes. POST form signed by pt's gill Gupta.    Palliative team will sign off. Please consult team as needed, if appropriate. Thank you for the Palliative Medicine consult and allowing us to participate in the care of Ms. Yael Uriarte. CODE STATUS - DNR/DNI Limited Interventions NO Feeding tubes.       Francisco JETERN, RN, [facility]  Palliative Medicine Inpatient RN  [address]: [phone]

## 2022-02-11 NOTE — PROGRESS NOTES
Spoke with patient and two nieces at bedside. Patient is very eager to participate with therapy so she can return home. Discussed inpatient rehab/SNF. Copy of SNF facilities given to family.     Haley Chen RN

## 2022-02-11 NOTE — PROGRESS NOTES
[facility]/IP[facility] REFERRAL CONTACT NOTE  [id number] [place] [address] for Physical Rehabilitation    RE: Anais Camacho     Thank you for the opportunity to review this patient's case for admission to [address] [place] [address] for Physical Rehabilitation. Based on our pre-admission screening:     [x ] This patient does not meet criteria for admission to [facility] for Physical Rehabilitation due to:    [x ] Too functional, per documentation, patient does not require both Physical and Occupational Therapy Services at an acute rehabilitation level of intensity. Again, Thank you for this referral. Should you have any questions please do not hesitate to call. Sincerely,  Jose Alberto Colon. Gill Mcgrath, [id number]   Gill Mcgrath, RN  Admissions [facility] for Physical Rehabilitation  [phone]

## 2022-02-11 NOTE — PROGRESS NOTES
[facility] Hospitalist Group  Progress Note    Patient: Isrrael Machuca Age: 80 y.o. : 1930 MR#: [id number] SSN: [id number]  Date/Time: 2022    Subjective:     Patient is laying in bed in no apparent distress, awake and alert, follows commands and responds appropriately. Patient denies any jaw discomfort today    Assessment/Plan:     Possible TIA  Atrial fibrillation  Dementia  Leukocytosis  Hypokalemia  Hypocalcemia  Left TMJ tenderness, x-ray shows TMJ arthritis    Plan   Aspirin and statin  Diet as per speech  Leukocytosis may be a stress reaction-improved  PT and OT   Palliative care is following  Discussed with patient. I counseled patient to not drive until she obtains clearance from neurology to drive. I called patient's niece at phone #[phone] and updated her regarding patient's medical care. Niece states that  has given her a list of local facilities and she will be reviewing and letting case management know.   Discussed with     Case discussed with:  [x]Patient  []Family  []Nursing  []Case Management  DVT Prophylaxis:  [x]Lovenox  []Hep SQ  []SCDs  []Coumadin   []On Heparin gtt    Objective:   VS:   Visit Vitals  BP (!) 133/55   Pulse 84   Temp 99.4 °F (37.4 °C)   Resp 18   Ht 5' 5\" (1.651 m)   Wt 53.5 kg (118 lb)   SpO2 99%   Breastfeeding No   BMI 19.64 kg/m²      Tmax/24hrs: Temp (24hrs), Av.8 °F (37.1 °C), Min:98 °F (36.7 °C), Max:99.4 °F (37.4 °C)    Input/Output:     Intake/Output Summary (Last 24 hours) at 2022 1641  Last data filed at 2/10/2022 2132  Gross per 24 hour   Intake 220 ml   Output --   Net 220 ml       General:  Awake, follows simple commands  Cardiovascular:  S1S2+, RRR  Pulmonary:  CTA b/l  GI:  Soft, BS+, NT, ND  Extremities:  No edema  Speech is clear, patient is able to move all 4 extremities    Labs:    Recent Results (from the past 24 hour(s))   CBC WITH AUTOMATED DIFF    Collection Time: 22 1:47 AM   Result Value Ref Range    WBC 10.6 4.6 - 13.2 K/uL    RBC 3.34 (L) 4.20 - 5.30 M/uL    HGB 11.5 (L) 12.0 - 16.0 g/dL    HCT 35.7 35.0 - 45.0 %    .9 (H) 78.0 - 100.0 FL    MCH 34.4 (H) 24.0 - 34.0 PG    MCHC 32.2 31.0 - 37.0 g/dL    RDW 14.4 11.6 - 14.5 %    PLATELET 781 (H) 726 - 420 K/uL    MPV 9.2 9.2 - 11.8 FL    NRBC 0.0 0  WBC    ABSOLUTE NRBC 0.00 0.00 - 0.01 K/uL    NEUTROPHILS 57 40 - 73 %    LYMPHOCYTES 34 21 - 52 %    MONOCYTES 7 3 - 10 %    EOSINOPHILS 1 0 - 5 %    BASOPHILS 1 0 - 2 %    IMMATURE GRANULOCYTES 1 (H) 0.0 - 0.5 %    ABS. NEUTROPHILS 6.0 1.8 - 8.0 K/UL    ABS. LYMPHOCYTES 3.6 0.9 - 3.6 K/UL    ABS. MONOCYTES 0.8 0.05 - 1.2 K/UL    ABS. EOSINOPHILS 0.1 0.0 - 0.4 K/UL    ABS. BASOPHILS 0.1 0.0 - 0.1 K/UL    ABS. IMM.  GRANS. 0.1 (H) 0.00 - 0.04 K/UL    DF AUTOMATED     METABOLIC PANEL, BASIC    Collection Time: 02/11/22  1:47 AM   Result Value Ref Range    Sodium 136 136 - 145 mmol/L    Potassium 4.2 3.5 - 5.5 mmol/L    Chloride 103 100 - 111 mmol/L    CO2 28 21 - 32 mmol/L    Anion gap 5 3.0 - 18 mmol/L    Glucose 104 (H) 74 - 99 mg/dL    BUN 18 7.0 - 18 MG/DL    Creatinine 0.59 (L) 0.6 - 1.3 MG/DL    BUN/Creatinine ratio 31 (H) 12 - 20      GFR est AA >60 >60 ml/min/1.73m2    GFR est non-AA >60 >60 ml/min/1.73m2    Calcium 9.0 8.5 - 10.1 MG/DL   GLUCOSE, POC    Collection Time: 02/11/22  1:29 PM   Result Value Ref Range    Glucose (POC) 166 (H) 70 - 110 mg/dL     Additional Data Reviewed:      Signed By: Adriana Mari MD     February 11, 2022

## 2022-02-11 NOTE — PROGRESS NOTES
Problem: Mobility Impaired (Adult and Pediatric)  Goal: *Acute Goals and Plan of Care (Insert Text)  Description: Physical Therapy Goals  Initiated 2/10/2022 and to be accomplished within 7 day(s)  1. Patient will move from supine to sit and sit to supine in bed with modified independence. 2.  Patient will transfer from bed to chair and chair to bed with modified independence using the least restrictive device. 3.  Patient will perform sit to stand with modified independence. 4.  Patient will ambulate with modified independence for 50 feet with the least restrictive device. 5.  Patient will ascend/descend 3 stairs with handrail(s) with supervision/set-up. PLOF: Mod I with cane. Lives alone. 3 steps to enter home; 4 steps into garage for laundry. Outcome: Progressing Towards Goal   PHYSICAL THERAPY TREATMENT    Patient: Bradley Hall (70 y.o. female)  Date: 2/11/2022  Diagnosis: Dysarthria [R47.1]  A-fib ([facility] [address] [I48.91] <principal problem not specified>       Precautions: Fall,Skin,Aspiration  PLOF: see above    ASSESSMENT:  Pt performed supine LE exercises as noted below prior to initiating mobility. Pt performed bed mobility and standing transfers at a supervision level. Gait training was initiated with a SPC however patient had multiple losses of balance in the first 10 feet of ambulation requiring min A to remain standing. Pt was transitioned to a RW and ambulated with SBA/CGA. Pt ambulated 40 feet with RW twice with a seated rest break between trials. Pt was left sitting up in the recliner with needs in reach and family present. Progression toward goals:   [x]      Improving appropriately and progressing toward goals  []      Improving slowly and progressing toward goals  []      Not making progress toward goals and plan of care will be adjusted     PLAN:  Patient continues to benefit from skilled intervention to address the above impairments.   Continue treatment per established plan of care. Discharge Recommendations:  Home Physical Therapy with 24 hour supervision for safety  Further Equipment Recommendations for Discharge:  N/A     SUBJECTIVE:   Patient stated It feels so good to be out of that bed.     OBJECTIVE DATA SUMMARY:   Critical Behavior:  Neurologic State: Alert  Orientation Level: Oriented X4  Cognition: Follows commands  Safety/Judgement: Fall prevention  Functional Mobility Training:  Bed Mobility:  Supine to Sit: Supervision  Sit to Supine: Supervision  Transfers:  Sit to Stand: Supervision  Stand to Sit: Supervision     Balance:  Sitting: Intact  Standing: With support  Standing - Static: Fair  Standing - Dynamic :  (fair- with SPC; fair with RW)   Ambulation/Gait Training:  Distance (ft): 40 Feet (ft) (x2)  Assistive Device: Cane, straight;Walker, rolling  Ambulation - Level of Assistance: Stand-by assistance;Contact guard assistance  Gait Abnormalities: Decreased step clearance  Base of Support: Narrowed  Speed/Consuelo: Slow  Therapeutic Exercises:         EXERCISE   Sets   Reps   Active Active Assist   Passive Self ROM   Comments   Ankle Pumps 1 10  [x] [] [] []    Quad Sets/Glut Sets    [] [] [] [] Hold for 5 secs   Hamstring Sets   [] [] [] []    Short Arc Quads   [] [] [] []    Heel Slides 1 10 [x] [] [] []    Straight Leg Raises   [] [] [] []    Hip Abd/Add 1 10 [x] [] [] []    Long Arc Quads 1 10 [x] [] [] []    Seated Marching 1 10 [x] [] [] []    Standing Marching   [] [] [] []       [] [] [] []        Pain:  Pain level pre-treatment: 0/10  Pain level post-treatment: 0/10   Pain Intervention(s): n/a  Activity Tolerance:   good  Please refer to the flowsheet for vital signs taken during this treatment.   After treatment:   [x] Patient left in no apparent distress sitting up in chair  [] Patient left in no apparent distress in bed  [x] Call bell left within reach  [] Nursing notified  [x] Caregiver present  [] Bed alarm activated  [] SCDs applied      COMMUNICATION/EDUCATION:   [x]         Role of Physical Therapy in the acute care setting. [x]         Fall prevention education was provided and the patient/caregiver indicated understanding. [x]         Patient/family have participated as able in working toward goals and plan of care. [x]         Patient/family agree to work toward stated goals and plan of care. []         Patient understands intent and goals of therapy, but is neutral about his/her participation.   []         Patient is unable to participate in stated goals/plan of care: ongoing with therapy staff.  []         Other:        Juju Sandhu, PT   Time Calculation: 23 mins

## 2022-02-11 NOTE — ROUTINE PROCESS
Bedside and Verbal shift change report given to 80 [address] Giselle Thomson Se (oncoming nurse) by Esequiel Posey RN   (offgoing nurse). Report included the following information SBAR, Kardex, Intake/Output, MAR and Recent Results.

## 2022-02-11 NOTE — ACP (ADVANCE CARE PLANNING)
Palliative Medicine    [facility] (Physician Orders for Scope of Treatment)       Date of conversation: 2022   Location: [facility]  Length (minutes): 40    Participants:   [x] Other Surrogate Decision Maker / Next of Kin    Name: Keegan Harris    Relationship to Patient: Niece    Phone Number: [phone]     Other persons present:   Name: Mally Krishna     Relationship to patient: Niece   Name:     Relationship to patient:      [employer] Steps® ACP Facilitator: Severo Halo NP    Conversation Topics   (If Patient does not have decision making capacity, Agent/Surrogate responds based on understanding of how patient would respond if capable)    Understanding of Medical Condition/s AND Potential Complications:    Healthcare Agent/Other Surrogate: Pt's nieces are legal NOK. Pt's  just  on 22 and she has no children. All siblings are . Pt's nieces are agreeable to be her medical decision makers. State that prior to hospitalization the pt was living at home with her  and that they were taking care of each other. Reportedly pt was driving prior to admission. Family is hoping that the pt can go to SNF for rehab so that she can regain some strength. Do not believe that the pt can be home by herself d/t weakness and debility. Family is working to make plans for where pt will go after SNF. Discussed benefits and burdens of CPR and intubation in the setting of advanced age. Both of pt's nieces stated that they would not subject the pt to these measures knowing that it would not bring her back to a better state of health. They are agreeable to completing a POST form for DDNR, limited medical interventions, and NO feeding tubes.   POST form signed by pt's niece Keegan Harris.     Needs to discuss with spiritual/Yazidism advisor: [] Yes  [x] No    Needs more information about illness and complications:  [] Yes  [x] No      Cardiopulmonary Resuscitation        Order Elected for CPR:  []  Attempt Resuscitation [x]  Do Not Attempt Resuscitation      When NOT in Cardiopulmonary Arrest, Order Elected:      [] Comfort Measures  [x] Limited Additional Interventions  [] Full Interventions    Artificially Administered Nutrition, Order Elected:    [x] No Feeding Tube   [] Feeding Tube for a defined trial period  [] Feeding Tube long-term if indicated    The following was provided (check all that apply):      Healthcare Decision Information Cards:   [] Help with Breathing Facts   [] Tube Feeding Facts   [] CPR Facts      [] Review of existing Advance Directive  [] Assistance with Completion of New Advance Directive   [x] Review of [place] POST Form       Meeting Outcomes:   [] ACP discussion completed   [x] [place] form completed  [x] [place] prepared for Provider review and signature   [x] Original placed on Chart, if in facility (form to be sent with patient at discharge)  [x] Copy given to healthcare agent    [] Copy scanned to electronic medical record       Follow-up plan:     [] Schedule follow-up conversation to continue planning   [x] Referred individual to Provider for additional questions/concerns   [x] Advised patient/agent/surrogate to review completed POST form and update if needed with changes in condition, patient preferences or care setting     [] This note routed to one or more involved healthcare providers    Pt is now DNR/DNI. POST form completed. Thank you for the Palliative Medicine consult and allowing us to participate in the care of Mrs. Oracio Hugo. Will continue to monitor and provide support.     Linus Estes RN, BSN  Palliative Medicine Inpatient RN  DR. AGUIAR [facility]  Palliative COPE Line: [phone]

## 2022-02-11 NOTE — PROGRESS NOTES
Problem: Self Care Deficits Care Plan (Adult)  Goal: *Acute Goals and Plan of Care (Insert Text)  Description: Occupational Therapy Goals  Initiated 2/10/2022 within 7 day(s). 1.  Patient will perform grooming with modified independence. 2.  Patient will perform bathing with modified independence with good safety awareness. 3.  Patient will perform upper body dressing and lower body dressing with modified independence with good safety awareness. 4.  Patient will perform toilet transfers with modified independence using LRAD. 5.  Patient will perform all aspects of toileting with modified independence. 6.  Patient will participate in upper extremity therapeutic exercise/activities with modified independence for 8 minutes. 7.  Patient will utilize energy conservation techniques during functional activities with min verbal cues. Prior Level of Function: Mod I with ADLs and functional mobility using SPC in home and RW in community, nieces and dtr-in-law assist as needed e.g. homemaking tasks, laundry is in garage with steps       Outcome: Progressing Towards Goal   OCCUPATIONAL THERAPY TREATMENT    Patient: Vanessa Gruber (6 y.o. female)  Date: 2/11/2022  Diagnosis: Dysarthria [R47.1]  A-fib ([facility] [address] [I48.91] <principal problem not specified>       Precautions: Fall,Skin,Aspiration    Chart, occupational therapy assessment, plan of care, and goals were reviewed. ASSESSMENT:  Pt sleeping upon entry. Arouses to voice. [place]. Pt seen for toileting ADL and safety w/walker mgt and functional mobility. Pt requires vc's for hand placement w/functional transfers and min vc's for safety w/walker and functional mobility to bathroom. Pt demonstrates good dynamic standing balance w/toileting ADL demonstrating good use of grab bar for improved dynamic standing balance. Pt tolerates standing sinkside performing hand hygiene. Declines further ADL intervention and request return to supine 2/2 c/o fatigue.  No c/o pain. HR 90s throughout session. Progression toward goals:  [x]          Improving appropriately and progressing toward goals  []          Improving slowly and progressing toward goals  []          Not making progress toward goals and plan of care will be adjusted     PLAN:  Patient continues to benefit from skilled intervention to address the above impairments. Continue treatment per established plan of care. Discharge Recommendations:  Home Health for safety check  Further Equipment Recommendations for Discharge:  shower chair     SUBJECTIVE:   Patient stated Oh honey, I have plenty of young people with me all the time.     OBJECTIVE DATA SUMMARY:   Cognitive/Behavioral Status:  Neurologic State: Alert  Orientation Level: Oriented X4  Cognition: Follows commands  Safety/Judgement: Fall prevention    Functional Mobility and Transfers for ADLs:   Bed Mobility:  Supine to Sit: Modified independent  Sit to Supine: Modified independent   Transfers:  Sit to Stand: Supervision (w/walker)   Toilet Transfer : Supervision (w/grab bar)   Bathroom Mobility: Supervision/set up (w/walker)  Balance:  Sitting: Intact  Standing: Impaired; With support    ADL Intervention:  Grooming  Position Performed: Standing  Washing Hands: Modified independent    Toileting  Toileting Assistance: Modified independent  Bladder Hygiene: Modified independent  Clothing Management: Modified independent    Pain:  Pain level pre-treatment: 0/10   Pain level post-treatment: 0/10    Activity Tolerance:    Fair 2/2 c/o fatigue    Please refer to the flowsheet for vital signs taken during this treatment.   After treatment:   []  Patient left in no apparent distress sitting up in chair  [x]  Patient left in no apparent distress in bed  [x]  Call bell left within reach  []  Nursing notified  []  Caregiver present  []  Bed alarm activated    COMMUNICATION/EDUCATION:   [] Role of Occupational Therapy in the acute care setting  [] Home safety education was provided and the patient/caregiver indicated understanding. [] Patient/family have participated as able in working towards goals and plan of care. [x] Patient/family agree to work toward stated goals and plan of care. [] Patient understands intent and goals of therapy, but is neutral about his/her participation. [] Patient is unable to participate in goal setting and plan of care.       Thank you for this referral.  TERESE Miller  Time Calculation: 17 mins

## 2022-02-11 NOTE — PROGRESS NOTES
Problem: Falls - Risk of  Goal: *Absence of Falls  Description: Document Kavya Gaytan Fall Risk and appropriate interventions in the flowsheet.   Outcome: Progressing Towards Goal  Note: Fall Risk Interventions:  Mobility Interventions: Bed/chair exit alarm,Communicate number of staff needed for ambulation/transfer,Patient to call before getting OOB,PT Consult for mobility concerns,Strengthening exercises (ROM-active/passive),Utilize walker, cane, or other assistive device    Mentation Interventions: Adequate sleep, hydration, pain control,Bed/chair exit alarm,Door open when patient unattended,Evaluate medications/consider consulting pharmacy,More frequent rounding,Increase mobility,Reorient patient,Room close to nurse's station,Toileting rounds,Update white board    Medication Interventions: Bed/chair exit alarm,Evaluate medications/consider consulting pharmacy,Patient to call before getting OOB,Teach patient to arise slowly    Elimination Interventions: Bed/chair exit alarm,Call light in reach,Patient to call for help with toileting needs,Toileting schedule/hourly rounds    History of Falls Interventions: Bed/chair exit alarm,Consult care management for discharge planning,Evaluate medications/consider consulting pharmacy,Room close to nurse's station,Door open when patient unattended         Problem: Patient Education: Go to Patient Education Activity  Goal: Patient/Family Education  Outcome: Progressing Towards Goal     Problem: Pain  Goal: *Control of Pain  Outcome: Progressing Towards Goal  Goal: *PALLIATIVE CARE:  Alleviation of Pain  Outcome: Progressing Towards Goal     Problem: Patient Education: Go to Patient Education Activity  Goal: Patient/Family Education  Outcome: Progressing Towards Goal     Problem: Injury - Risk of, Adverse Drug Event  Goal: *Absence of adverse drug events  Outcome: Progressing Towards Goal  Goal: *Absence of medication errors  Outcome: Progressing Towards Goal  Goal: *Knowledge of prescribed medications  Outcome: Progressing Towards Goal     Problem: Patient Education: Go to Patient Education Activity  Goal: Patient/Family Education  Outcome: Progressing Towards Goal     Problem: Patient Education: Go to Patient Education Activity  Goal: Patient/Family Education  Outcome: Progressing Towards Goal     Problem: Afib Pathway: Day 1  Goal: Off Pathway (Use only if patient is Off Pathway)  Outcome: Progressing Towards Goal  Goal: Activity/Safety  Outcome: Progressing Towards Goal  Goal: Consults, if ordered  Outcome: Progressing Towards Goal  Goal: Diagnostic Test/Procedures  Outcome: Progressing Towards Goal  Goal: Nutrition/Diet  Outcome: Progressing Towards Goal  Goal: Discharge Planning  Outcome: Progressing Towards Goal  Goal: Medications  Outcome: Progressing Towards Goal  Goal: Respiratory  Outcome: Progressing Towards Goal  Goal: Treatments/Interventions/Procedures  Outcome: Progressing Towards Goal  Goal: Psychosocial  Outcome: Progressing Towards Goal  Goal: *Optimal pain control at patient's stated goal  Outcome: Progressing Towards Goal  Goal: *Hemodynamically stable  Outcome: Progressing Towards Goal  Goal: *Stable cardiac rhythm  Outcome: Progressing Towards Goal  Goal: *Lungs clear or at baseline  Outcome: Progressing Towards Goal  Goal: *Labs within defined limits  Outcome: Progressing Towards Goal  Goal: *Describes available resources and support systems  Outcome: Progressing Towards Goal     Problem: Afib Pathway: Day 2  Goal: Off Pathway (Use only if patient is Off Pathway)  Outcome: Progressing Towards Goal  Goal: Activity/Safety  Outcome: Progressing Towards Goal  Goal: Consults, if ordered  Outcome: Progressing Towards Goal  Goal: Diagnostic Test/Procedures  Outcome: Progressing Towards Goal  Goal: Nutrition/Diet  Outcome: Progressing Towards Goal  Goal: Discharge Planning  Outcome: Progressing Towards Goal  Goal: Medications  Outcome: Progressing Towards Goal  Goal: Respiratory  Outcome: Progressing Towards Goal  Goal: Treatments/Interventions/Procedures  Outcome: Progressing Towards Goal  Goal: Psychosocial  Outcome: Progressing Towards Goal  Goal: *Hemodynamically stable  Outcome: Progressing Towards Goal  Goal: *Optimal pain control at patient's stated goal  Outcome: Progressing Towards Goal  Goal: *Stable cardiac rhythm  Outcome: Progressing Towards Goal  Goal: *Lungs clear or at baseline  Outcome: Progressing Towards Goal  Goal: *Describes available resources and support systems  Outcome: Progressing Towards Goal     Problem: Afib Pathway: Day 3  Goal: Off Pathway (Use only if patient is Off Pathway)  Outcome: Progressing Towards Goal  Goal: Activity/Safety  Outcome: Progressing Towards Goal  Goal: Diagnostic Test/Procedures  Outcome: Progressing Towards Goal  Goal: Nutrition/Diet  Outcome: Progressing Towards Goal  Goal: Discharge Planning  Outcome: Progressing Towards Goal  Goal: Medications  Outcome: Progressing Towards Goal  Goal: Respiratory  Outcome: Progressing Towards Goal  Goal: Treatments/Interventions/Procedures  Outcome: Progressing Towards Goal  Goal: Psychosocial  Outcome: Progressing Towards Goal     Problem: Afib: Discharge Outcomes (not in CAT)  Goal: *Hemodynamically stable  Outcome: Progressing Towards Goal  Goal: *Stable cardiac rhythm  Outcome: Progressing Towards Goal  Goal: *Lungs clear or at baseline  Outcome: Progressing Towards Goal  Goal: *Optimal pain control at patient's stated goal  Outcome: Progressing Towards Goal  Goal: *Identifies cardiac risk factors  Outcome: Progressing Towards Goal  Goal: *Verbalizes home exercise program, activity guidelines, cardiac precautions  Outcome: Progressing Towards Goal  Goal: *Verbalizes understanding and describes prescribed diet  Outcome: Progressing Towards Goal  Goal: *Verbalizes understanding and describes medication purposes and frequencies  Outcome: Progressing Towards Goal  Goal: *Anxiety reduced or absent  Outcome: Progressing Towards Goal  Goal: *Understands and describes signs and symptoms to report to providers(Stroke Metric)  Outcome: Progressing Towards Goal  Goal: *Describes follow-up/return visits to physicians  Outcome: Progressing Towards Goal  Goal: *Describes available resources and support systems  Outcome: Progressing Towards Goal  Goal: *Influenza immunization  Outcome: Progressing Towards Goal  Goal: *Pneumococcal immunization  Outcome: Progressing Towards Goal  Goal: *Describes smoking cessation resources  Outcome: Progressing Towards Goal

## 2022-02-11 NOTE — PROGRESS NOTES
[facility]: [phone]  [facility]: [phone]     Patient Name: Ricki Beaz  YOB: 1930    Date of consult : 2/11/22  Reason for Consult: establish goals of care  Requesting Provider: Carmella Barfield NP   Primary Care Physician: Edison Gtz MD      SUMMARY:   Ricki Baez is a 80 y.o. female with a past history of dementia, atrial fibrillation, HTN, who was admitted on 2/9/2022 from home with a diagnosis of rule out CVA, A. fib with RVR. Current medical issues leading to Palliative Medicine involvement include: Supportive care and establish goals of care    CHIEF COMPLAINT: Confusion    HPI/SUBJECTIVE:    Patient is a 80-year-old  female that came into the emergency department yesterday with possible signs of CVA including dysarthria, slurred speech, left-sided jaw pain, shortness of breath and pain in bilateral knees. She was living with her  up until last Saturday when he passed away. The patient is:   [] Verbal and participatory  [x] Non-participatory due to: Confusion    GOALS OF CARE:  Patient/Health Care Proxy Stated Goals: Prolong life      TREATMENT PREFERENCES:   Code Status: Full Code         PALLIATIVE DIAGNOSES:   1. Goals of care/ACP  2. Dementia  3. General debility  4. Encounter for palliative medicine         PLAN:   2/11/22: This NP and MATT Kruger in to evaluate patient at the bedside. She was up and out of bed walking with her cane to the bathroom. Patient still appears quite frail but ambulating fairly well. Her 2 nieces were present for the meeting Marge Husain and Christo Black. Both have stepped up to assist patient in making medical decisions. At this time she is unable to really participate in goals of care discussions. Our team has discussed the burdens and benefits of CPR especially in somebody with advanced age.   Both are in agreement that the patient would never want extreme measures to keep her alive at end-of-life. For that reason they have completed a POST for DNR/DNI, limited interventions, and no feeding tube. They have asked about comfort care and hospice. At this time patient does not appear to meet eligibility for hospice services and the family is still interested in rehab especially possibly acute rehab if she qualifies. We have referred them to the case management team who is meeting with them today to discuss discharge planning. Goals of care: DNR/DNI, limited interventions, no feeding tubes. POST completed and placed on chart. Orders updated. 1.   Goals of care/ACP  This NP and MATT Kapoor met with patient at the bedside. She is extremely hard of hearing and only oriented to herself. For this reason she is unable to participate in goals of care discussions. We have reached out to her stepdaughter who states the patient does not have an AMD.  For this reason her nieces will be her next of kin and medical decision makers. We have set up a meeting for tomorrow at 10:30 AM to discuss goals of care moving forward. At this time patient remains a full code with full interventions  2. Dementia  Per family reported as moderate progressive when at home. Patient has some decline since recent hospitalization and now only oriented to herself. Remains very pleasant. Has been evaluated by neurologist here at the hospital.  3.  General debility  Patient's current palliative performance score is around 50% she has a bed to chair existence with some ability to ambulate with assistance. She is unable to do any work due to extensive disease progression and frailty, patient also needs considerable amount of assistance with her functional ADLs and self-care. Her intake of nutrition is normal to reduced.   4.  Encounter for palliative medicine  Due to advanced age and general decline with frailty patient does have a poor long-term prognosis and high risk for further decline especially since the recent loss of her primary caregiver her . 5.   Initial consult note routed to primary continuity provider  6. Communicated plan of care with: Palliative IDT      Advance Care Planning:  [] [facility] Interdisciplinary Team has updated the ACP Navigator with [address] and Patient Capacity    Primary Decision Maker ([address]): MAX is the niece    Medical Interventions: Full interventions   Other Instructions:         As far as possible, the palliative care team has discussed with patient / health care proxy about goals of care / treatment preferences for patient. HISTORY:     History obtained from: chart review   Active Problems:    A-fib ([facility] [address] (2/9/2022)      Dysarthria (2/9/2022)      Past Medical History:   Diagnosis Date    Atypical angina ([facility] [address]     Bulging disc 9/21/2010    Cardiac catheterization 10/16/1990    Patent coronary arteries. Normal LV function.  Cardiac echocardiogram 10/02/2012    EF 60%. No WMA. Mild-mod LAE.  Cardiac nuclear imaging test, low risk 10/02/2012    No ischemia or prior infarction. EF 77%. No WMA. Low risk pharm stress test.  Profound vagal response to Lexiscan.  Carotid duplex 01/25/2006    No significant stenosis bilaterally.     Dizziness     Dyspnea     Fecal incontinence     Follow-up exam 9/21/2010    Hematuria     Hepatitis B     Hip pain 4/13/2010    Hypertension     Mixed stress and urge urinary incontinence     Overactive bladder     Spinal stenosis       Past Surgical History:   Procedure Laterality Date    CT ABD PELV W CONT      surgery for diverticulitis    HX APPENDECTOMY      HX BACK SURGERY      HX CHOLECYSTECTOMY      HX HEART CATHETERIZATION  10/16/90    HX ORTHOPAEDIC  [id number]    5 back surgeries    HX ORTHOPAEDIC  3/06    left rotator cuff repair    AR BREAST SURGERY PROCEDURE UNLISTED      both breasts 3 times each Family History   Problem Relation Age of Onset    Other Mother         hx of heart disorder    Hypertension Mother     Stroke Mother     Other Father         hx of heart disorder    Hypertension Father     Diabetes Father     Cancer Other      History reviewed, no pertinent family history.   Social History     Tobacco Use    Smoking status: Former Smoker     Packs/day: 1.00     Years: 6.00     Pack years: 6.00     Quit date: 1971     Years since quittin.1    Smokeless tobacco: Never Used   Substance Use Topics    Alcohol use: Not Currently     Alcohol/week: 2.5 standard drinks     Types: 3 Glasses of wine per week     Allergies   Allergen Reactions    Iodinated Contrast Media Swelling     Swollen all over with welps      Current Facility-Administered Medications   Medication Dose Route Frequency    amLODIPine (NORVASC) tablet 2.5 mg  2.5 mg Oral DAILY    atorvastatin (LIPITOR) tablet 20 mg  20 mg Oral QHS    aspirin chewable tablet 81 mg  81 mg Oral DAILY    labetaloL (NORMODYNE;TRANDATE) 20 mg/4 mL (5 mg/mL) injection 5 mg  5 mg IntraVENous Q10MIN PRN    enoxaparin (LOVENOX) injection 40 mg  40 mg SubCUTAneous Q24H    pantoprazole (PROTONIX) tablet 20 mg  20 mg Oral ACB    ondansetron (ZOFRAN) injection 4 mg  4 mg IntraVENous Q6H PRN    acetaminophen (TYLENOL) tablet 650 mg  650 mg Oral Q4H PRN    calcium carbonate (TUMS) chewable tablet 200 mg [elemental]  200 mg Oral TID WITH MEALS          Clinical Pain Assessment (nonverbal scale for nonverbal patients): Clinical Pain Assessment  Severity: 0          Duration: for how long has pt been experiencing pain (e.g., 2 days, 1 month, years)  Frequency: how often pain is an issue (e.g., several times per day, once every few days, constant)     FUNCTIONAL ASSESSMENT:     Palliative Performance Scale (PPS):  PPS: 50    ECOG  ECOG Status : Limited self-care     PSYCHOSOCIAL/SPIRITUAL SCREENING:      Any spiritual / Spiritism concerns:  [] Yes /  [x] No    Caregiver Burnout:  [] Yes /  [x] No /  [] No Caregiver Present    Pt's 2 nieces are acting as NOK as decision makers. Has no caregiver at this time. Anticipatory grief assessment:   [x] Normal  / [] Maladaptive        REVIEW OF SYSTEMS:     Systems: constitutional, ears/nose/mouth/throat, respiratory, gastrointestinal, genitourinary, musculoskeletal, integumentary, neurologic, psychiatric, endocrine. Positive findings noted below. Modified ESAS Completed by: provider           Pain: 0           Dyspnea: 0               Positive and pertinent negative findings in ROS are noted above in HPI. The following systems were [x] reviewed / [] unable to be reviewed as noted in HPI  Other findings are noted below. PHYSICAL EXAM:     Constitutional: alert and interactive, very hard of hearing   Eyes: pupils equal, anicteric  ENMT: no nasal discharge, moist mucous membranes  Cardiovascular: regular rhythm, distal pulses intact  Respiratory: breathing not labored, symmetric  Gastrointestinal: soft non-tender, +bowel sounds  Musculoskeletal: no deformity, no tenderness to palpation  Skin: warm, dry  Neurologic: Only oriented to self,  following commands, moving all extremities  Psychiatric: full affect, no hallucinations    Other: Wt Readings from Last 3 Encounters:   02/10/22 53.5 kg (118 lb)   11/16/21 53.5 kg (118 lb)   08/09/19 55.3 kg (122 lb)     Blood pressure (!) 133/57, pulse 83, temperature 99.3 °F (37.4 °C), resp. rate 18, height 5' 5\" (1.651 m), weight 53.5 kg (118 lb), SpO2 99 %, not currently breastfeeding.   Pain:  Pain Scale 1: Numeric (0 - 10)  Pain Intensity 1: 0     Pain Location 1: Jaw  Pain Orientation 1: Left             LAB AND IMAGING FINDINGS:     Lab Results   Component Value Date/Time    WBC 10.6 02/11/2022 01:47 AM    HGB 11.5 (L) 02/11/2022 01:47 AM    PLATELET 226 (H) 05/20/4733 01:47 AM     Lab Results   Component Value Date/Time    Sodium 136 02/11/2022 01:47 AM Potassium 4.2 02/11/2022 01:47 AM    Chloride 103 02/11/2022 01:47 AM    CO2 28 02/11/2022 01:47 AM    BUN 18 02/11/2022 01:47 AM    Creatinine 0.59 (L) 02/11/2022 01:47 AM    Calcium 9.0 02/11/2022 01:47 AM    Magnesium 1.3 (L) 02/09/2022 12:55 PM    Phosphorus 2.6 02/09/2022 05:40 PM      Lab Results   Component Value Date/Time    Alk. phosphatase 33 (L) 02/09/2022 12:55 PM    Protein, total 3.7 (L) 02/09/2022 12:55 PM    Albumin 1.8 (L) 02/09/2022 12:55 PM    Globulin 1.9 (L) 02/09/2022 12:55 PM     Lab Results   Component Value Date/Time    INR 1.1 02/09/2022 12:55 PM    Prothrombin time 13.8 02/09/2022 12:55 PM    aPTT 37.8 (H) 02/09/2022 12:55 PM      No results found for: IRON, FE, TIBC, IBCT, PSAT, FERR   No results found for: PH, PCO2, PO2  No components found for: [place]   Lab Results   Component Value Date/Time     04/10/2017 06:30 PM    CK - MB 2.0 04/10/2017 06:30 PM              Total time: 35 minutes   Counseling / coordination time, spent as noted above:   > 50% counseling / coordination:  Time spent in direct consultation with the patient, medical team, and family     Prolonged service was provided for  []30 min   []75 min in face to face time in the presence of the patient, spent as noted above. Time Start:   Time End:     Disclaimer: Sections of this note are dictated using utilizing voice recognition software, which may have resulted in some phonetic based errors in grammar and contents. Even though attempts were made to correct all the mistakes, some may have been missed, and remained in the body of the document. If questions arise, please contact our department.

## 2022-02-11 NOTE — PROGRESS NOTES
Problem: Dysphagia (Adult)  Goal: *Acute Goals and Plan of Care (Insert Text)  Description: Patient will:  1. Tolerate PO trials with 0 s/s overt distress in 4/5 trials  2. Utilize compensatory swallow strategies/maneuvers (decrease bite/sip, size/rate, alt. liq/sol) with min cues in 4/5 trials  3. Perform oral-motor/laryngeal exercises to increase oropharyngeal swallow function with min cues  4. Complete an objective swallow study (i.e., MBSS) to assess swallow integrity, r/o aspiration, and determine of safest LRD, min A as indicated/ordered by MD     Rec:     Easy to chew with thin liquids  Aspiration precautions  HOB >45 during po intake, remain >30 for 30-45 minutes after po   Small bites/sips; alternate liquid/solid with slow feeding rate   Oral care TID  Meds per pt preference    Outcome: Progressing Towards Goal       SPEECH LANGUAGE PATHOLOGY DYSPHAGIA TREATMENT    Patient: Zaida Dickson (20 y.o. female)  Date: 2/11/2022  Diagnosis: Dysarthria [R47.1]  A-fib ([address] [I48.91] <principal problem not specified>      Precautions:  Fall,Skin,Aspiration  PLOF: Per HNP    ASSESSMENT:  Pt asleep upon entering room; pt A/Ox4 once lights were turned on. SLP and student SLP observed pt taking medications; 0 S/S witnessed. Pt seen with breakfast at EOB. Pt seen with thin liquids via cup/straw; laryngeal elevation WNL upon palpation, however, pt demo occasional throat clears post swallow without change in VQ. Pt tolerated purees and easy to chew diet with 0 S/S of aspiration. Regular texture trials administered demoing prolonged mastication. Minimal oral residue present post swallow with all solid presentations; given minimal cues to slow rate, swallow before taking next bite, and performing a liquid wash after solids, pt achieved clearance. Pt c/o jaw pain. Recommend continue diet and f/u 1-2x to ensure diet safety/tolerance.      Progression toward goals:  [x]         Improving appropriately and progressing toward goals  []         Improving slowly and progressing toward goals  []         Not making progress toward goals and plan of care will be adjusted     PLAN:  Recommendations and Planned Interventions:  See above. Patient continues to benefit from skilled intervention to address the above impairments. Continue treatment per established plan of care. Discharge Recommendations: To Be Determined     SUBJECTIVE:   Patient stated I have to be honest with you, my jaw is in pain and swollen. OBJECTIVE:   Cognitive and Communication Status:  Neurologic State: Alert  Orientation Level: Oriented to person  Cognition: Follows commands,Impulsive  Perception: Appears intact  Perseveration: No perseveration noted  Safety/Judgement: Fall prevention  Dysphagia Treatment:  Oral Assessment:  Oral Assessment  Labial: No impairment  Dentition: Natural,Intact  Oral Hygiene: adequate  Lingual: No impairment  Velum: No impairment  Mandible: No impairment  P.O. Trials:       Vocal quality prior to P.O.: No impairment   Consistency Presented: Thin liquid,Solid   How Presented: Self-fed/presented,Cup/sip,Spoon,Straw,Successive swallows       Bolus Acceptance: No impairment   Bolus Formation/Control: No impairment       Propulsion: No impairment   Oral Residue: None   Initiation of Swallow: No impairment   Laryngeal Elevation: Functional   Aspiration Signs/Symptoms: None   Pharyngeal Phase Characteristics: No impairment, issues, or problems    Effective Modifications: None   Cues for Modifications: None         Oral Phase Severity: No impairment   Pharyngeal Phase Severity : No impairment   Oral Motor Exercises:              Exercises:  Laryngeal Exercises:             PAIN:  Pain level pre-treatment: 5/10   Pain level post-treatment: 5/10   Pain Intervention(s): Medication (see MAR);  Rest, Ice, Repositioning  Response to intervention: Nurse notified, See doc flow    After treatment:   []              Patient left in no apparent distress sitting up in chair  [x]              Patient left in no apparent distress in bed- sitting on EOB  [x]              Call bell left within reach  [x]              Nursing notified  []              Family present  []              Caregiver present  [x]              Bed alarm activated      COMMUNICATION/EDUCATION:   [x] Aspiration precautions; swallow safety; compensatory techniques  []        Patient unable to participate in education; education ongoing with staff  [x]  Posted safety precautions in patient's room.   [] Oral-motor/laryngeal strengthening exercises      Maritza Melchor  Time Calculation: 27 mins

## 2022-02-11 NOTE — ROUTINE PROCESS
Bedside and Verbal shift change report given to Luis Eduardo Doyle RN (oncoming nurse) by BALTA Mera RN (offgoing nurse). Report included the following information SBAR, Kardex, MAR and Recent Results.

## 2022-02-12 PROCEDURE — 74011250636 HC RX REV CODE- 250/636: Performed by: INTERNAL MEDICINE

## 2022-02-12 PROCEDURE — 99232 SBSQ HOSP IP/OBS MODERATE 35: CPT | Performed by: INTERNAL MEDICINE

## 2022-02-12 PROCEDURE — 74011250637 HC RX REV CODE- 250/637: Performed by: NURSE PRACTITIONER

## 2022-02-12 PROCEDURE — 74011250636 HC RX REV CODE- 250/636: Performed by: NURSE PRACTITIONER

## 2022-02-12 PROCEDURE — 65660000000 HC RM CCU STEPDOWN

## 2022-02-12 PROCEDURE — 74011250637 HC RX REV CODE- 250/637: Performed by: EMERGENCY MEDICINE

## 2022-02-12 PROCEDURE — 74011000250 HC RX REV CODE- 250: Performed by: INTERNAL MEDICINE

## 2022-02-12 PROCEDURE — 2709999900 HC NON-CHARGEABLE SUPPLY

## 2022-02-12 RX ORDER — AMLODIPINE BESYLATE 2.5 MG/1
2.5 TABLET ORAL DAILY
Qty: 30 TABLET | Refills: 0 | Status: SHIPPED | OUTPATIENT
Start: 2022-02-13

## 2022-02-12 RX ORDER — CALCIUM CARBONATE 200(500)MG
200 TABLET,CHEWABLE ORAL
Qty: 20 TABLET | Refills: 0 | Status: SHIPPED
Start: 2022-02-12

## 2022-02-12 RX ORDER — ATORVASTATIN CALCIUM 20 MG/1
20 TABLET, FILM COATED ORAL
Qty: 30 TABLET | Refills: 0 | Status: SHIPPED | OUTPATIENT
Start: 2022-02-12

## 2022-02-12 RX ADMIN — AMLODIPINE BESYLATE 2.5 MG: 2.5 TABLET ORAL at 08:23

## 2022-02-12 RX ADMIN — CALCIUM CARBONATE (ANTACID) CHEW TAB 500 MG 200 MG: 500 CHEW TAB at 08:23

## 2022-02-12 RX ADMIN — ATORVASTATIN CALCIUM 20 MG: 20 TABLET, FILM COATED ORAL at 21:06

## 2022-02-12 RX ADMIN — CALCIUM CARBONATE (ANTACID) CHEW TAB 500 MG 200 MG: 500 CHEW TAB at 17:31

## 2022-02-12 RX ADMIN — WATER 2.5 MG: 1 INJECTION, SOLUTION INTRAMUSCULAR; INTRAVENOUS; SUBCUTANEOUS at 03:34

## 2022-02-12 RX ADMIN — ENOXAPARIN SODIUM 40 MG: 100 INJECTION SUBCUTANEOUS at 17:31

## 2022-02-12 RX ADMIN — PANTOPRAZOLE SODIUM 20 MG: 20 TABLET, DELAYED RELEASE ORAL at 08:23

## 2022-02-12 RX ADMIN — ASPIRIN 81 MG CHEWABLE TABLET 81 MG: 81 TABLET CHEWABLE at 08:23

## 2022-02-12 RX ADMIN — CALCIUM CARBONATE (ANTACID) CHEW TAB 500 MG 200 MG: 500 CHEW TAB at 12:36
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
## 2022-02-12 NOTE — PROGRESS NOTES
[facility]    Patient: Ricki Baez Age: 80 y.o. : 1930 MR#: [id number] SSN: [id number]  Date/Time: 2022    Subjective:     Patient seen in presence of her niece and patient's nurse. Patient is sitting up in a chair without any issues. Denies any headaches or dizziness. No chest pain or abdominal pain or nausea or vomiting or back pain. No tingling or numbness. Assessment:     1. 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 Presumed TIA  3. Paroxysmal atrial fibrillation, rate controlled  4. Advanced dementia  5. Leukocytosis, resolved  6. Hypokalemia, resolved  7. Hypertension    Plan:    Continue aspirin and statin  X-ray TMJ joint report reviewed  Continue Norvasc and monitor blood pressure  Continue home medication for the comorbidities  Patient is not a candidate for any anticoagulation due to risk of fall. Spoke to patient's niece at bedside. Plan to discharge her tomorrow with home health care. Discussed with her at length that patient needs to be monitored in home setting to see whether she gets sundowning. She verbalized understanding about it. Patient may have a couple of options when she goes home. If she does not have any issues, she can stay home and her personal aide versus she can be placed in assisted living facility if she continues to have issues with her dementia.     Case discussed with:  [x]Patient  []Family  []Nursing  []Case Management  DVT Prophylaxis:  [x]Lovenox  []Hep SQ  []SCDs  []Coumadin   []On Heparin gtt    Objective:   VS:   Visit Vitals  /66   Pulse 100   Temp 97.5 °F (36.4 °C)   Resp 18   Ht 5' 5\" (1.651 m)   Wt 53.5 kg (118 lb)   SpO2 96%   Breastfeeding No   BMI 19.64 kg/m²      Tmax/24hrs: Temp (24hrs), Av.6 °F (37 °C), Min:97.5 °F (36.4 °C), Max:99.5 °F (37.5 °C)    Input/Output:   No intake or output data in the 24 hours ending 22 1141    General:  Awake, follows simple commands  Cardiovascular:  S1S2+, RRR  Pulmonary:  CTA b/l  GI:  Soft, BS+, NT, ND  Extremities:  No edema  CNS: Speech clear, patient is able to move all 4 extremities    Labs:    Recent Results (from the past 24 hour(s))   GLUCOSE, POC    Collection Time: 02/11/22  1:29 PM   Result Value Ref Range    Glucose (POC) 166 (H) 70 - 110 mg/dL   GLUCOSE, POC    Collection Time: 02/11/22  5:18 PM   Result Value Ref Range    Glucose (POC) 155 (H) 70 - 110 mg/dL     Total time to take care of this patient was 30 minutes and more than 50% of time was spent counseling and coordinating care. Signed By: Rasheed Schaffer MD     February 12, 2022      Disclaimer: Sections of this note are dictated using utilizing voice recognition software, which may have resulted in some phonetic based errors in grammar and contents. Even though attempts were made to correct all the mistakes, some may have been missed, and remained in the body of the document. If questions arise, please contact our department.
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
## 2022-02-12 NOTE — ROUTINE PROCESS
Bedside and Verbal shift change report given to Yonatan Olson LPN (oncoming nurse) by Meliton Wilkins RN (offgoing nurse). Report included the following information SBAR.
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
## 2022-02-12 NOTE — PROGRESS NOTES
Patient is pleasantly confused. No apparent evidence of need for restraints at this current time. Niece (POA) is at bedside. Call placed to MD to reevaluate need for restraints. Will continue to monitor.
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
## 2022-02-12 NOTE — PROGRESS NOTES
Patient has dementia and sundowning with agitation. Will use restrains due to patient is at increased risk to herself and interfering with medical management. Family is in agreement of using restrains when I spoke to them earlier today.

## 2022-02-12 NOTE — PROGRESS NOTES
Notified patient niece of bilateral soft wrist restraints order. Niece stated she expected the call. Will continue to monitor patient.

## 2022-02-13 ENCOUNTER — HOME HEALTH ADMISSION (OUTPATIENT)
Dept: HOME HEALTH SERVICES | Facility: HOME HEALTH | Age: 87
End: 2022-02-13
Payer: MEDICARE

## 2022-02-13 VITALS
RESPIRATION RATE: 18 BRPM | SYSTOLIC BLOOD PRESSURE: 133 MMHG | HEART RATE: 98 BPM | HEIGHT: 65 IN | OXYGEN SATURATION: 99 % | TEMPERATURE: 98.5 F | WEIGHT: 118 LBS | DIASTOLIC BLOOD PRESSURE: 75 MMHG | BODY MASS INDEX: 19.66 KG/M2

## 2022-02-13 PROCEDURE — 99239 HOSP IP/OBS DSCHRG MGMT >30: CPT | Performed by: INTERNAL MEDICINE

## 2022-02-13 PROCEDURE — 2709999900 HC NON-CHARGEABLE SUPPLY

## 2022-02-13 PROCEDURE — 74011000258 HC RX REV CODE- 258: Performed by: INTERNAL MEDICINE

## 2022-02-13 RX ORDER — LANOLIN ALCOHOL/MO/W.PET/CERES
3 CREAM (GRAM) TOPICAL
Status: DISCONTINUED | OUTPATIENT
Start: 2022-02-13 | End: 2022-02-13 | Stop reason: HOSPADM

## 2022-02-13 RX ORDER — CALCIUM CARBONATE 200(500)MG
200 TABLET,CHEWABLE ORAL
Status: DISCONTINUED | OUTPATIENT
Start: 2022-02-13 | End: 2022-02-13 | Stop reason: HOSPADM

## 2022-02-13 RX ORDER — DEXTROSE MONOHYDRATE AND SODIUM CHLORIDE 5; .9 G/100ML; G/100ML
100 INJECTION, SOLUTION INTRAVENOUS CONTINUOUS
Status: DISCONTINUED | OUTPATIENT
Start: 2022-02-13 | End: 2022-02-13

## 2022-02-13 RX ADMIN — DEXTROSE MONOHYDRATE AND SODIUM CHLORIDE 100 ML/HR: 5; .9 INJECTION, SOLUTION INTRAVENOUS at 12:05

## 2022-02-13 NOTE — DISCHARGE SUMMARY
[facility]  Face to Face Encounter    Patients Name: Jimbo Alvarez    YOB: 1930    Primary Diagnosis:     1. 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 Presumed TIA  3. Paroxysmal atrial fibrillation, rate controlled  4. Advanced dementia  5. Leukocytosis, resolved  6. Hypokalemia, resolved  7. Hypertension    Date of Face to Face:   February 13, 2022                              Face to Face Encounter findings are related to primary reason for home care:   yes    1. I certify that the patient needs intermittent skilled nursing care, physical therapy and/or speech therapy. I will not be following this patient in the Community and Dr. Otilio Stanford MD  will be responsible for signing the [address] of Care. 2. Initial Orders for Care: Must be completed only if Face to Face MD will not be signing the [address] Skilled Nursing, Physical Therapy and Occupational Therapy    3. I certify that this patient is homebound for the following reason(s): Only leaves the home for medical reasons or [employer] services and are infrequent and of short duration for other reasons  and Decreased mental status requiring 24 hour supervision     4. I certify that this patient is under my care and that I, or a nurse practitioner or [address] [place] [address] working with me, had a Face-to-Face Encounter that meets the physician Face-to-Face Encounter requirements. Document the physical findings from the Face-to-Face Encounter that support the need for skilled services: Has new medications that requires skilled nursing teaching and monitoring for understanding and compliance  and Has new finding of weakness and altered mobility that requires skilled physical/occupational and/or speech therapy services for evaluation and interventions.      Elsi Salinas MD  2/13/2022

## 2022-02-13 NOTE — PROGRESS NOTES
Discharge planning    Discharge order noted for today. Freedom of choice obtained for any local home health agency. Referral placed in que for [address] [facility] and spoke with Yue. Met with Maribel Pascual and agreeable to the transition plan today. Transport has been arranged [address] [facility]  home health  orders have been forwarded to [facility] via [facility] Updated bedside RNMaira  to the transition plan.   Discharge information has been documented on the AVS.       STEFFANY AllenN, RN  Pager # [phone]  Care Manager

## 2022-02-13 NOTE — DISCHARGE INSTRUCTIONS
Patient armband removed and shredded  MyChart Activation    Thank you for requesting access to [facility]. Please follow the instructions below to securely access and download your online medical record. [facility] allows you to send messages to your doctor, view your test results, renew your prescriptions, schedule appointments, and more. How Do I Sign Up? 1. In your internet browser, go to [email or web address]  2. Click on the First Time User? Click Here link in the Sign In box. You will be redirect to the New Member Sign Up page. 3. Enter your [facility] Access Code exactly as it appears below. You will not need to use this code after youve completed the sign-up process. If you do not sign up before the expiration date, you must request a new code. [facility] Access Code: [email or web address]  Expires: 3/26/2022 12:43 PM (This is the date your [facility] access code will )    4. Enter the last four digits of your Social Security Number (xxxx) and Date of Birth (mm/dd/yyyy) as indicated and click Submit. You will be taken to the next sign-up page. 5. Create a [facility] ID. This will be your [facility] login ID and cannot be changed, so think of one that is secure and easy to remember. 6. Create a [facility] password. You can change your password at any time. 7. Enter your Password Reset Question and Answer. This can be used at a later time if you forget your password. 8. Enter your e-mail address. You will receive e-mail notification when new information is available in [address] 9. Click Sign Up. You can now view and download portions of your medical record. 10. Click the Download Summary menu link to download a portable copy of your medical information. Additional Information    If you have questions, please visit the Frequently Asked Questions section of the [employer] website at [email or web address] Remember, [facility] is NOT to be used for urgent needs.  For medical emergencies, dial 911.        DISCHARGE SUMMARY from Nurse    PATIENT INSTRUCTIONS:    What to do at Home:  Recommended activity: Activity as tolerated,     If you experience any of the following symptoms chest pain, shortness of breath, fever, chills, elevated temperature greater than 100.9 F, please follow up with nearest Emergency room. *  Please give a list of your current medications to your Primary Care Provider. *  Please update this list whenever your medications are discontinued, doses are      changed, or new medications (including over-the-counter products) are added. *  Please carry medication information at all times in case of emergency situations. These are general instructions for a healthy lifestyle:    No smoking/ No tobacco products/ Avoid exposure to second hand smoke  Surgeon General's Warning:  Quitting smoking now greatly reduces serious risk to your health. Obesity, smoking, and sedentary lifestyle greatly increases your risk for illness    A healthy diet, regular physical exercise & weight monitoring are important for maintaining a healthy lifestyle    You may be retaining fluid if you have a history of heart failure or if you experience any of the following symptoms:  Weight gain of 3 pounds or more overnight or 5 pounds in a week, increased swelling in our hands or feet or shortness of breath while lying flat in bed. Please call your doctor as soon as you notice any of these symptoms; do not wait until your next office visit. The discharge information has been reviewed with the caregiver. The caregiver verbalized understanding. Discharge medications reviewed with the caregiver and appropriate educational materials and side effects teaching were provided.   ___________________________________________________________________________________________________________________________________

## 2022-02-13 NOTE — PROGRESS NOTES
[facility]    Patient: Sony Simon Age: 80 y.o. : 1930 MR#: [id number] SSN: [id number]  Date/Time: 2022    Subjective:     Patient seen in presence of her niece. Patient is quite sleepy. Opens eyes and able to answer questions. She says she is feeling okay. Denies any chest pain abdominal pain. She has not eaten anything since this morning. Upon review of chart, it was found out that patient got Zyprexa 2.5 mg IV x1 around 3 AM due to agitation associated with her dementia. Assessment:     1. 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 Presumed TIA  3. Paroxysmal atrial fibrillation, rate controlled  4. Advanced dementia  5. Leukocytosis, resolved  6. Hypokalemia, resolved  7. Hypertension    Plan:    Advised nurses to provide patient IV fluid and only use p.o. medications once she is more awake. Continue aspirin and statin  X-ray TMJ joint report reviewed  Continue Norvasc and monitor blood pressure  Continue home medication for the comorbidities  Patient is not a candidate for any anticoagulation due to risk of fall. Plan was to discharge this patient with home health care today but because of patient got Zyprexa and now she is heavily sedated, she is not safe to be discharged until she wakes up. Patient's niece does not feel comfortable taking her home in this condition but she stated she is open to take her home if she wakes up. Please do not use any sedative agents like Zyprexa/Haldol or Ativan if patient stays here tonight. Can use soft restraints. We will put patient on melatonin. Anticipated date of discharge: Later today otherwise tomorrow if patient becomes less sleepy.     Case discussed with:  [x]Patient  []Family  []Nursing  []Case Management  DVT Prophylaxis:  [x]Lovenox  []Hep SQ  []SCDs  []Coumadin   []On Heparin gtt    Objective:   VS:   Visit Vitals  /67 (BP 1 Location: Right arm, BP Patient Position: At rest)   Pulse 84   Temp 97.2 °F (36.2 °C)   Resp 18   Ht 5' 5\" (1.651 m)   Wt 53.5 kg (118 lb)   SpO2 99%   Breastfeeding No   BMI 19.64 kg/m²      Tmax/24hrs: Temp (24hrs), Av.7 °F (36.5 °C), Min:97.2 °F (36.2 °C), Max:98.4 °F (36.9 °C)    Input/Output:     Intake/Output Summary (Last 24 hours) at 2022 1120  Last data filed at 2022 0410  Gross per 24 hour   Intake 320 ml   Output --   Net 320 ml       General: Sleepy, opens eyes and follows simple commands, not in acute distress currently. Cardiovascular:  S1S2+, RRR  Pulmonary: Diminished breath sound bibasilar, poor inspiratory effort, no wheezes currently. GI:  Soft, BS+, NT, ND  Extremities:  No pedal edema  CNS: Sleepy, follows some commands, moves all 4 extremities very well, no tremors noted. Labs:    No results found for this or any previous visit (from the past 24 hour(s)). Total time to take care of this patient was 30 minutes and more than 50% of time was spent counseling and coordinating care. Signed By: Dominic Parekh MD     2022      Disclaimer: Sections of this note are dictated using utilizing voice recognition software, which may have resulted in some phonetic based errors in grammar and contents. Even though attempts were made to correct all the mistakes, some may have been missed, and remained in the body of the document. If questions arise, please contact our department.

## 2022-02-13 NOTE — DISCHARGE SUMMARY
Physician Discharge Summary       Patient: Brandi Velazquez MRN: [id number]  SSN: [id number]    YOB: 1930  Age: 80 y.o. Sex: female    PCP: Fran Velasco MD    Allergies: Iodinated contrast media    Admit date: 2/9/2022  Admitting Provider: Raghav Velarde MD    Discharge date: 2/13/2022  Discharging Provider: Bhupinder Cochran MD    * Admission Diagnoses: Dysarthria [R47.1]  A-fib [facility]) [I48.91]    * Discharge Diagnoses:      1. 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 Presumed TIA  3. Paroxysmal atrial fibrillation, rate controlled  4. Advanced dementia  5. Leukocytosis, resolved  6. Hypokalemia, resolved  7. Hypertension    * Hospital Course: Patient presented to hospital on February 9, 2022 with complaint of dysarthria and left jaw pain. Please refer hospital admission H&P for further detail. Patient had normal CT head in the emergency room. MRI of the brain was reported no acute intracranial process other than moderately extensive parenchymal volume loss. Patient had bilateral carotid arterial Doppler done showed mild heterogeneous plaques bilaterally without evidence of significant stenosis. Echocardiogram showed ejection fraction 55-60 percentage and negative bubble study. Patient's dysarthria got better. X-ray of TMJ joint showed osteoarthritis of TM joints bilaterally right greater than left. She had no problem chewing the food. She has advanced dementia and had off-and-on agitation nighttime requiring soft restraints as well as antiagitation medications like Zyprexa which made her very sleepy on the day of discharge. However, patient started eating food and also walk and waking up. Patient's caregiver felt comfortable to take her home on the following medications. Patient initially had leukocytosis that got better. Patient had hypokalemia that was repleted appropriately.   It has been discussed at length with patient's caregiver that patient may end up going to assisted living home as she lives by herself but patient's next of kin has decided to watch her over the next 3 days before making a final decision. In my opinion, patient may do better at home at this point due to her significant advanced dementia. Patient will be discharged home on the following medications today. * Procedures: None  * No surgery found *      Consults: None    Significant Diagnostic Studies: CT head, MRI brain, echocardiogram and carotid Doppler as well as x-ray of the TMJ joints    Discharge Exam:  Please refer my progress note from February 13, 2022 for further detail    * Discharge Condition: improved  * Disposition: Home    Discharge Medications:  Current Discharge Medication List      START taking these medications    Details   atorvastatin (LIPITOR) 20 mg tablet Take 1 Tablet by mouth nightly. Qty: 30 Tablet, Refills: 0  Start date: 2/12/2022      calcium carbonate (TUMS) 200 mg calcium (500 mg) chew Take 1 Tablet by mouth three (3) times daily as needed for PRN Reason (Other) (GERD). Qty: 20 Tablet, Refills: 0  Start date: 2/12/2022      amLODIPine (NORVASC) 2.5 mg tablet Take 1 Tablet by mouth daily. Qty: 30 Tablet, Refills: 0  Start date: 2/13/2022         CONTINUE these medications which have NOT CHANGED    Details   tolterodine ER (DETROL-LA) 2 mg ER capsule Take 2 mg by mouth daily. aspirin delayed-release 81 mg tablet Take 1 Tab by mouth daily. Qty: 30 Tab, Refills: 1      omeprazole (PRILOSEC) 20 mg capsule Take 20 mg by mouth daily. donepeziL (Aricept) 5 mg tablet Take 5 mg by mouth nightly. nitroglycerin (NITROSTAT) 0.4 mg SL tablet 1 Tab by SubLINGual route every five (5) minutes as needed. Qty: 25 Tab, Refills: 3             * Follow-up Care/Patient Instructions:   Activity: PT/OT Eval and Treat  Diet: Cardiac Diet  Wound Care: None needed    Follow-up Information     Follow up With Specialties Details Why [address] [facility]  Your preferred agency, Chosen to continue managing health care needs [phone] [facility] [address]    Mitzy Smith, [address] [place], [place] [address] [facility]  [phone]          Follow-up Appointments   Procedures    FOLLOW UP VISIT Appointment in: Other ([facility]) With PCP in 3-5 days     With PCP in 3-5 days     Standing Status:   Standing     Number of Occurrences:   1     Order Specific Question:   Appointment in     Answer: Other (Specify)         Total time of discharge greater than 35 minutes    Disclaimer: Sections of this note are dictated using utilizing voice recognition software, which may have resulted in some phonetic based errors in grammar and contents. Even though attempts were made to correct all the mistakes, some may have been missed, and remained in the body of the document. If questions arise, please contact our department.       Signed:  Azeem Garrison MD  2/13/2022  2:07 PM

## 2022-02-13 NOTE — PROGRESS NOTES
Discharge instructions given to patient and family members verbally and written all questions answered, IV and armband discontinued, prescriptions given to be filled at patient's local pharmacy, personal belonging gathered by family members    As part of the discharge instructions, medications already given today were discussed with the patient. The next dose due of all ordered meds was highlighted as part of the medication discharge instructions. Discussed with the patient the importance of taking medications as directed, as well as the side effects and adverse reactions to medications ordered.

## 2022-02-14 ENCOUNTER — HOME CARE VISIT (OUTPATIENT)
Dept: HOME HEALTH SERVICES | Facility: HOME HEALTH | Age: 87
End: 2022-02-14
Payer: MEDICARE

## 2022-02-14 ENCOUNTER — HOME CARE VISIT (OUTPATIENT)
Dept: SCHEDULING | Facility: HOME HEALTH | Age: 87
End: 2022-02-14
Payer: MEDICARE

## 2022-02-14 VITALS
RESPIRATION RATE: 16 BRPM | HEART RATE: 83 BPM | OXYGEN SATURATION: 97 % | DIASTOLIC BLOOD PRESSURE: 58 MMHG | SYSTOLIC BLOOD PRESSURE: 112 MMHG | TEMPERATURE: 98.7 F

## 2022-02-14 PROCEDURE — 3331090002 HH PPS REVENUE DEBIT

## 2022-02-14 PROCEDURE — G0299 HHS/HOSPICE OF RN EA 15 MIN: HCPCS

## 2022-02-14 PROCEDURE — 3331090001 HH PPS REVENUE CREDIT

## 2022-02-14 PROCEDURE — 400013 HH SOC

## 2022-02-14 PROCEDURE — 400018 HH-NO PAY CLAIM PROCEDURE

## 2022-02-15 ENCOUNTER — HOME CARE VISIT (OUTPATIENT)
Dept: HOME HEALTH SERVICES | Facility: HOME HEALTH | Age: 87
End: 2022-02-15
Payer: MEDICARE

## 2022-02-15 PROCEDURE — 3331090001 HH PPS REVENUE CREDIT

## 2022-02-15 PROCEDURE — 3331090002 HH PPS REVENUE DEBIT

## 2022-02-16 ENCOUNTER — HOME CARE VISIT (OUTPATIENT)
Dept: HOME HEALTH SERVICES | Facility: HOME HEALTH | Age: 87
End: 2022-02-16
Payer: MEDICARE

## 2022-02-16 PROCEDURE — 3331090002 HH PPS REVENUE DEBIT

## 2022-02-16 PROCEDURE — 3331090001 HH PPS REVENUE CREDIT

## 2022-02-16 NOTE — HOME HEALTH
Skilled services/Home bound verification:     Skilled Reason for admission/summary of clinical condition:  Luh has PMH of Afib, advanced dementia . She currently is using her walker to walk. SHe does live alone ,   a week ago. Her nieces are working on a plan. .  This patient is homebound for the following reasons Requires considerable and taxing effort to leave the home , Requires the assistance of 1 or more persons to leave the home , Only leaves the home for medical reasons or [employer] services and are infrequent and of short duration for other reasons  and Decreased mental status requiring 24 hour supervision . Caregiver: relative. Caregiver assists with ADLS, meals and all other needs . Medications reconciled and all medications are available in the home this visit. The following education was provided regarding medications, medication interactions, and look alike medications (specify): all meds reconciled for admission . Medications  are effective at this time. High risk medication teaching regarding anticoagulants, hyperglycemic agents or opiod narcotics performed (specify) nitro and how to use it and when to use it ,    Dr Daisy Montague MD  notified of any discrepancies/medication interactions moderate interaction: tolterodine ER and donepeziL no Md feedback at this time     The concurrent use of donepezil with other agents that prolong the QTc interval may result in potentially life-threatening cardiac arrhythmias, including torsades de pointes(TdP).(1). Home health supplies by type and quantity ordered/delivered this visit include: none    Patient education provided this visit to include: what services are to be provided, when to call SN and when to call MD, s/s of UTI, making sure that she stays dry .   Patient/caregiver degree of understanding:good    Patient level of understanding of education provided: neices and patinet verbalized understanding     Eveline Education Provided: NA  Patient response to procedure performed:  no procedure preformed     Home exercise program/Homework provided: deep breating  every hour     Pt/Caregiver instructed on plan of care and are agreeable to plan of care at this time. Physician Mino Lorenz MD  notified of patient admission to home health and plan of care including anticipated frequency of SN and treatments/interventions/modalities of education . Discharge planning discussed with patient and caregiver. Discharge planning as follows: when goals met and education is complete . Pt/Caregiver did verbalize understanding of discharge planning. Next MD appointment TBD (date) with  MD/NP/PA. Patient/caregiver encouraged/instructed to keep appointment as lack of follow through with physician appointment could result in discontinuation of home care services for non-compliance.

## 2022-02-17 ENCOUNTER — HOME CARE VISIT (OUTPATIENT)
Dept: SCHEDULING | Facility: HOME HEALTH | Age: 87
End: 2022-02-17
Payer: MEDICARE

## 2022-02-17 ENCOUNTER — HOME CARE VISIT (OUTPATIENT)
Dept: HOME HEALTH SERVICES | Facility: HOME HEALTH | Age: 87
End: 2022-02-17
Payer: MEDICARE

## 2022-02-17 PROCEDURE — 3331090002 HH PPS REVENUE DEBIT

## 2022-02-17 PROCEDURE — G0155 HHCP-SVS OF CSW,EA 15 MIN: HCPCS

## 2022-02-17 PROCEDURE — 3331090001 HH PPS REVENUE CREDIT

## 2022-02-18 ENCOUNTER — HOME CARE VISIT (OUTPATIENT)
Dept: HOME HEALTH SERVICES | Facility: HOME HEALTH | Age: 87
End: 2022-02-18
Payer: MEDICARE

## 2022-02-18 PROCEDURE — 3331090001 HH PPS REVENUE CREDIT

## 2022-02-18 PROCEDURE — 3331090002 HH PPS REVENUE DEBIT

## 2022-02-19 PROCEDURE — 3331090002 HH PPS REVENUE DEBIT

## 2022-02-19 PROCEDURE — 3331090001 HH PPS REVENUE CREDIT

## 2022-02-20 PROCEDURE — 3331090002 HH PPS REVENUE DEBIT

## 2022-02-20 PROCEDURE — 3331090001 HH PPS REVENUE CREDIT

## 2022-02-21 ENCOUNTER — HOME CARE VISIT (OUTPATIENT)
Dept: HOME HEALTH SERVICES | Facility: HOME HEALTH | Age: 87
End: 2022-02-21
Payer: MEDICARE

## 2022-02-21 PROCEDURE — 3331090002 HH PPS REVENUE DEBIT

## 2022-02-21 PROCEDURE — 3331090001 HH PPS REVENUE CREDIT

## 2022-02-22 ENCOUNTER — HOME CARE VISIT (OUTPATIENT)
Dept: SCHEDULING | Facility: HOME HEALTH | Age: 87
End: 2022-02-22
Payer: MEDICARE

## 2022-02-22 VITALS
SYSTOLIC BLOOD PRESSURE: 121 MMHG | HEART RATE: 87 BPM | OXYGEN SATURATION: 98 % | DIASTOLIC BLOOD PRESSURE: 58 MMHG | TEMPERATURE: 97.7 F

## 2022-02-22 PROCEDURE — G0152 HHCP-SERV OF OT,EA 15 MIN: HCPCS

## 2022-02-22 PROCEDURE — G0151 HHCP-SERV OF PT,EA 15 MIN: HCPCS

## 2022-02-22 PROCEDURE — 3331090001 HH PPS REVENUE CREDIT

## 2022-02-22 PROCEDURE — 3331090002 HH PPS REVENUE DEBIT

## 2022-02-22 PROCEDURE — G0300 HHS/HOSPICE OF LPN EA 15 MIN: HCPCS

## 2022-02-22 NOTE — HOME HEALTH
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 Medications reconciled. No changes in medications during this visit. PATIENT EDUCATION PROVIDED THIS VISIT TO INCLUDE: FALL PREVENTION TECHNIQUES: monitor medication that may alter mental status,  keeping walking pathways clean and clear of clutter and throw rugs, keeping night lights on for ability to see and easily, good visibility for safe transitioning thresholds and proper use and good compliance of using AD. PRESSURE ULCER PREVENTION TECHNIQUES:  proper positioning strategies including frequency of repositioning, prevention of shear and friction, appropriate skin hygiene and protection from moisture. ENERGY CONSERVATION TECHNIQUES:  rest, slow down, pacing, and complete tasks in manageable steps. PATIENT LEVEL OF UNDERSTANDING OF EDUCATION PROVIDED: Patient verbalized agreement and able to partially teach back information provided. PATIENT RESPONSE TO PROCEDURE PERFORMED: Patient able to demonstrates steadier and safer transfers and ambulation using rollator. PT recommend using rollator vs cane. Patient and niece verbalized understanding and agreement. Pt/Caregiver instructed on plan of care and are agreeable to plan of care at this time. Plan of care and admission to home health status called to attending physician: Fran Velasco MD.    Discharge planning discussed with patient and caregiver. Discharge planning as follows: Discharge to self under the supervision of MD once patient has met all goals or reached maximum potential  Pt/Caregiver did verbalize understanding.  -  PMHx: Atypical angina ([address] Bulging disc 9/21/2010 Cardiac catheterization 10/16/1990 Patent coronary arteries. Normal LV function. Cardiac echocardiogram 10/02/2012 EF 60%. No WMA. Mild-mod LAE. Cardiac nuclear imaging test, low risk 10/02/2012 No ischemia or prior infarction. EF 77%. No WMA. Low risk pharm stress test. Profound vagal response to Lexiscan. Carotid duplex 01/25/2006 No significant stenosis bilaterally. Dizziness Dyspnea Fecal Incontinence Follow-up exam 9/21/2010 Hematuria Hepatitis B Hip pain 4/13/2010 Hypertension Mixed stress and urge urinary incontinence Overactive bladder Spinal stenosis    -  Social hx and home eval: Patient lives alone in one level house with 4 outside steps without railing. Patient has tub shower with shower seat, no grab bar. Patient's spouse recently passed away. CG Involvement: Niece Nadja Shell) and stepdaughter (patricia, Naida Caba) will be taking turns in providing care and assistance almost every day but niece reported there will day/s that nobody will go and see the patient. Primary caregiver is available will be able to provide short term care and assist with sponge bathing, dressing, walking,  meal prep and setup, medication management,  grocery shopping, household chores, transportation to MD appointment, transferring from bed to chair and transferring to assistive device  -  PLOF: Patient has a PLOF of indep with ADLs, bed mobility, transfers and household ambulation using SPC both for household and  supervision assistance for community distances and negotiating steps. -  Subjective:  Pain: . Patient c/o chronic generalized pain with highest pain level of 4/10 and least pain level of 0/10. She denies taking pain medication for the past 24 hours. -  Objective  Balance:  Unsupported sitting: Normal  Static Standing balance: Fair  Dynamic standing balance: NA d/t safety reasons  Tinetti balance and gait test: 12/28 indicative of high fall risks. ROM: WFL on bilateral LEs. BLE MMT:  R hip flex 4-/5   R hip Abd  4-/5   R hip add  4-/5   R knee flex  4-/5  R knee ext.   4-/5    R ankle DF 4-/5  L hip flex  4-/5  L hip Abd  4-/5  L hip add  4-/5  L knee flex  4-/5  L knee ext. 4-/5  L ankle DF  4-/5    FTST requires supervision assistance to safely complete sit to stand with bilateral UE push off  -  Assessment:    Patient is seen for initial PT eval today with POC established. Patient presents with decrease ability to safely complete functional mobility. Patient requiring supervision assistance in bed mobility for  LE management and intermittent verbal cues for proper techniques, touching  assistance in safe transfers to and from bed, chair and toilet using SPC with moderate verbal cues for proper hand placements and techniques. Patient has decreased static standing balance at [address] [facility] [address] and needs to hold on to [facility] for support with touching assistance to prevent LOB. Patient is limited with ambulation to 30ft using SPC requiring touching assistance. Impairments in gait include forward lean, downward gaze, decreased  hip/knee flexion during swing phase, unequal step length, decreased claire, wide FILI, ataxic gait. Needed minimal assistance to ascend  and descend 4 steps without  railing using SPC , non-reciprocal. Patient has increased fall risks as evidenced by decrease Tinetti score at 12/28. Patient has hearing problems. She also stated SOB after  ambulating 20 ft. Skilled PT services are warranted to educate patient on fall prevention, energy conservation techniques, and HEP. Therapeutic treatment will also focus to improve transfers, strengthen both LE, improve standing balance and address gait impairments to decrease fall risks and to improve independence with performing ADLs. -  Plan: During this visit, discussed to patient POC and visit frequency of 2w2, 1w2. Patient verbalized agreement.  Patient to received education on pain management techniques, graded therapeutic exercises, balance training, bed mobility training, transfers training, HEP ed, education on energy conservation techniques, fall prevention techniques ed, pressure ulcer prevention techniques, gait/steps management training and d/c planning.

## 2022-02-22 NOTE — Clinical Note
Mrs. Nancie Strauss presents with good therapy potiental to increase her BUE strength, balance and safety awarness for increased safety when completing ADLs, IADLs and functional mobility. Pt currently is able to complete her ADLs with MOD I to supervision. Pt is able to complete functional mobility with supervision to SBA using single point cane. She required increased assist for IADLs since she is no longer allowed to drive however is able to complete light house hold chores and simple meal prep. Pt and her niece feel she is near her baseline with pt biggest concern being that she is no longer allowed to drive. Pt and niece declining further HHOT services and OT to politely oblige. PLAN: D/C 1w1 with pt to discharge home with home health nursing and family members support.

## 2022-02-22 NOTE — Clinical Note
Therapy Functional Score Assessment  Question                                            Score   Grooming                            0       Upper Dressing   0      Lower Dressing   1      Bathing                 4      Toilet Transfer                   0    Transfer                1            Ambulation                         1   Dyspnea                     1       Pain Interfering with activity        1  Est number therapy visits      1

## 2022-02-22 NOTE — HOME HEALTH
Clinical Condition per EPIC:  Pateint has PMH of Afib, advanced dementia. She currently is using her walker to walk. She lives alone,   this month. Her nieces are working on a plan and have been assisting her. 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 PLOF: Pt lived with her  in a 1 story home and was independent with ADLs,IADLs and functional mobility using cane. Pt was driving up until two weeks ago    SUBJECTIVE: Pt expressed to talk louder since she is very hard of hearing. MEDICATION RECONCILIATION: Pt and niece expressed no new changes to medications since 2022. Educated to continue as directed by MD.  DME ORDERED/RECOMMENEDED: N/A    OBJECTIVE:  BATHING: Pt recently unable to access tub shower unit safVringo when alone. Pt has been sponge bathing at sink with SBA. TOILETING: Pt MOD I for toileting with all aspects including clothing managment   UB DRESSING: Pt MOD I for upper body dressin to shaun/doff shirt/jacket  LB DRESSING: Pt supervision for lower body dressing to shaun/doff socks, shoes and pants with pt using figure 4 dressin technique. GROOMING: Pt MOD I for grooming tasks including washing hands, face, hair care and oral care. FEEDING: Pt independent with self feeding. Modified Jose RPE 5/10 after performing ambulation, transfers, and I/ADL assessment     OT instructed/demonstrated pt the following with fair understanding:Pt niece present for reinforcement        IADL: Pt daughters and nieces assist with shopping, transportation, cleaning and medications. AMBULATION: Pt ambualted short house hold distance using cane with supervision. COUCH: Pt modifed Independent with sit to stand from couch.   TOILET: Pt MOD I for toilet transfer with hand rails   TUB SHOWER: Pt SBA for tub shower unit with shower chair. Pt educated on tub transfer bench option vs to only shower when someone is home with her. Pt and her niece education on tub transfer bench options however feel more comfortable with pt not using shower unless someone is present reguardless. PATIENT RESPONSE TO TREATMENT: Pt responded well to [facility] assessment with pt able to complete without a need for rest breaks or increased pain. PATIENT EDUCATION PROVIDED THIS VISIT: OT role, energy conservation, fall prevention/safety training ADL/IADL tasks, continue diet and medications as instructed per MD, consult MD or urgent care for medical assistance as opposed to ER unless situation emergent. PATIENT LEVEL OF UNDERSTANDING OF EDUCATION PROVIDED: Pt and her niece able to teach back role of OT with good understanding. Pt able to teach back home saftey precautions including to not reach out for movable objects when ambulating like door knobs. Pt niece able to teach back good understanding of the pt medications and medication shorting for the patient. REHAB POTENTIAL: Mrs. Ricci President presents with good therapy potienital to increase her BUE strength, balance and saftey awarness for increased saftey when completing ADLs, IADLs and functional mobility. Pt currently is able to complete her ADLs with MOD I to supervision. Pt is able to complete functional mobility with supervision to SBA using single point cane. She required increased assist for IADLs since she is no longer allowed to drive however is able to complete light house hold chores and simple meal prep. Pt and her niece feel she is near her baseline with pt biggest concern being that she is no longer allowed to drive. Pt and niece declining further HHOT services and OT to [address] [place] [address].      ASSESSMENT: Pt presents with increased BUE weakness of 4-/5 however near age appropriate strength and appears functional when she is completing transfers and ADLs. Pt standing balance is fair+ with use of single point cane and occasional reaching out for walls/furniture for support. She has rollator for outside distances and trips to the store. Pt could benifit from increased saftey education in her home and balance training to increase her safe participation with ADLs/IADLs. Pt and her niece expressed she is near her baseline level of independence with pt ablet to complete ADLs and  light meal prep and cleaning tasks with supervision. Pt has not been using shower when home alone and has been sponge bathing at sink level to reduce fall risks. Skilled Care Provided: Pt completed full occupational therapy assessment to include balance, coordination, strengthening, ADL education/training, functional mobility and home safety. PLAN: D/C 1w1 with pt to discharge home with home health nursing and family memebers support.

## 2022-02-23 VITALS
OXYGEN SATURATION: 99 % | DIASTOLIC BLOOD PRESSURE: 56 MMHG | HEART RATE: 73 BPM | RESPIRATION RATE: 16 BRPM | TEMPERATURE: 98.4 F | SYSTOLIC BLOOD PRESSURE: 108 MMHG

## 2022-02-23 PROCEDURE — 3331090002 HH PPS REVENUE DEBIT

## 2022-02-23 PROCEDURE — 3331090001 HH PPS REVENUE CREDIT

## 2022-02-24 VITALS
OXYGEN SATURATION: 98 % | HEART RATE: 62 BPM | TEMPERATURE: 97.7 F | DIASTOLIC BLOOD PRESSURE: 65 MMHG | RESPIRATION RATE: 16 BRPM | SYSTOLIC BLOOD PRESSURE: 120 MMHG

## 2022-02-24 PROCEDURE — 3331090001 HH PPS REVENUE CREDIT

## 2022-02-24 PROCEDURE — 3331090002 HH PPS REVENUE DEBIT

## 2022-02-24 NOTE — HOME HEALTH
Skilled reason for visit: Afib, advanced dementia education of diagnosis and care/teaching related to medication mgt ,disease mgt and assessment. patient and daughter requested nursing discharge-stated she is doing well. Reviewed medications and care plan for changes. patient/cg to continue to take medications as prescribed. patient aware to monitor for effectiveness and to notify staff of any adverse reactions to medications/any changes to medication regimen. reviewed side effects, purposes, dosage, frequencies  patient encouraged to monitor for increase in pain and to continue with current pain management and to notify staff/md if pain becomes intolerable. Caregiver involvement: Family and friend are available at all times for assistance with iadls, adls, meal prep, medication management, taking to md appointments. Agency Progress toward goals: meeting goals  Home health supplies by type and quantity ordered/delivered this visit: not applicable  Patient education provided this visit: assessment, teaching   Patient level of understanding of education provided: Understanding of education went well teaching and feedback welcomed and patients questions answered. Patient's Progress towards personal goals: - progressing well  Patient is aware of fall risk. Reviewed safety precautions with patient. Educated on ambulation and or bedbound safety if applicable. INSTRUCTED PATIENT AND CG THAT SHOULD ANY NEEDS OR CONCERNS ARISE TO FIRST CALL OUR OFFICE, OR THE DR'S OFFICE  OR GO TO AN URGENT CARE CENTER AND NOT TO THE ED FOR NON-LIFE THREATENING EVENTS. IF IT IS LIFE THREATENING THEN CALL 911 OR GO TO THE CLOSEST ER. Progress toward goals- continuing to work towards goals as reviewed in careplan with patient on each visit. Careplan goals reviewed. Home exercise program:    activity as tolerated, trying to get physical activity 4-5 x weekly.  stopping activity if causing shortness of breath or chest pain, dizziness or weakness Continued need for the following skills: Nursing, The following discharge planning was discussed with the pt/caregiver: Patient will be discharged once education has completed, and patient is medically stable. Plan for next visit: continue with plan of care and teaching with each visit until discharged.       Patient and/or caregiver notified and agrees to changes in the [address]

## 2022-02-25 ENCOUNTER — HOME CARE VISIT (OUTPATIENT)
Dept: SCHEDULING | Facility: HOME HEALTH | Age: 87
End: 2022-02-25
Payer: MEDICARE

## 2022-02-25 PROCEDURE — 3331090001 HH PPS REVENUE CREDIT

## 2022-02-25 PROCEDURE — G0151 HHCP-SERV OF PT,EA 15 MIN: HCPCS

## 2022-02-25 PROCEDURE — 3331090002 HH PPS REVENUE DEBIT

## 2022-02-26 PROCEDURE — 3331090001 HH PPS REVENUE CREDIT

## 2022-02-26 PROCEDURE — 3331090002 HH PPS REVENUE DEBIT

## 2022-02-27 PROCEDURE — 3331090002 HH PPS REVENUE DEBIT

## 2022-02-27 PROCEDURE — 3331090001 HH PPS REVENUE CREDIT

## 2022-02-28 ENCOUNTER — HOME CARE VISIT (OUTPATIENT)
Dept: SCHEDULING | Facility: HOME HEALTH | Age: 87
End: 2022-02-28
Payer: MEDICARE

## 2022-02-28 VITALS
RESPIRATION RATE: 17 BRPM | TEMPERATURE: 98.2 F | OXYGEN SATURATION: 99 % | SYSTOLIC BLOOD PRESSURE: 118 MMHG | HEART RATE: 72 BPM | DIASTOLIC BLOOD PRESSURE: 58 MMHG

## 2022-02-28 VITALS
TEMPERATURE: 97.8 F | OXYGEN SATURATION: 97 % | RESPIRATION RATE: 16 BRPM | SYSTOLIC BLOOD PRESSURE: 112 MMHG | DIASTOLIC BLOOD PRESSURE: 70 MMHG | HEART RATE: 83 BPM

## 2022-02-28 PROCEDURE — G0151 HHCP-SERV OF PT,EA 15 MIN: HCPCS

## 2022-02-28 PROCEDURE — 3331090001 HH PPS REVENUE CREDIT

## 2022-02-28 PROCEDURE — 3331090002 HH PPS REVENUE DEBIT

## 2022-02-28 NOTE — HOME HEALTH
SUBJECTIVE: Patient reported she has been using her walker all the time and today is a good day. She denies pain for the past 24 hours and denies taking pain medication. CAREGIVER INVOLVEMENT/ASSISTANCE NEEDED FOR: Patient's step daughter - Jaguar Hogan is present during this visit and can assist with bathing, dressing, walking, bathroom, meal prep and setup, medication management,  grocery shopping, household chores, transportation to MD appointment, home exercise program, transferring from bed to chair and transferring to [address] [place] [address] ORDERED/DELIVERED THIS VISIT INCLUDE: NA  OBJECTIVE:  See interventions. PATIENT RESPONSE TO TREATMENT: Patient able to demonstrates 1 out of 3 (locking rollator, using SPC to walk inside the bathroom, turn lights on) safety measures when performing bathroom/toilet transfers. She was able to remember to turn lights on on first trial, remembers to use [facility] on second trial and remembers to lock rollator on third trial.    PATIENT LEVEL OF UNDERSTANDING OF EDUCATION PROVIDED: Patient continues to require max cues on safe bathroom/toilet transfers using SPC. She was able to safely negotiates 4 steps in the garage using [facility] without cues. ASSESSMENT OF PROGRESS TOWARD GOALS: Patient is now indep in using rollator for household ambulation/ navigation except in bathroom/toilet transfer where rollator can not fit in. Emphasized to patient the importance of assistance of another person when negoatiating steps from the garage where laundry room is located. CONTINUED NEED FOR THE FOLLOWING SKILLS:  graded therapeutic exercises, balance training, transfers training, HEP ed, education on energy conservation techniques, gait/steps management training and d/c planning. PLAN FOR NEXT VISIT: dynamic standing balance, HEP education, bathroom transfers, steps management and outside home ambulation using rollator.     THE FOLLOWING DISCHARGE PLANNING WAS DISCUSSED WITH THE PATIENT/CAREGIVER: Anticipated last day of therapy on 3/15/22. Patient and cg verbalized agreement and understanding.

## 2022-02-28 NOTE — HOME HEALTH
SUBJECTIVE: Patient reported fatigue since this morning. CAREGIVER INVOLVEMENT/ASSISTANCE NEEDED FOR: Patient's niece Marium Rios)  present during this visit and is able to assist with bathing, dressing, walking, turn in bed, bathroom, meal prep and setup, medication management, grocery shopping, household chores, transportation to MD appointment, home exercise program, transferring from bed to chair and transferring to assistive device  [address] ORDERED/DELIVERED THIS VISIT INCLUDE: NA  OBJECTIVE:  See interventions. PATIENT RESPONSE TO TREATMENT: Patient requires max cues for safe bathroom transfers using SPC. Patient tends to hold to walls vs using SPC    PATIENT LEVEL OF UNDERSTANDING OF EDUCATION PROVIDED: Patientand cg  able to verbalized undestanding but only Cg able to teach back information provided. ASSESSMENT OF PROGRESS TOWARD GOALS: Patient is now consistently using rollator for household mobility and she reported feeling more steady using it. CONTINUED NEED FOR THE FOLLOWING SKILLS:  graded therapeutic exercises, balance training, transfers training, HEP ed, education on energy conservation techniques, gait training and d/c planning. PLAN FOR NEXT VISIT: HEP education, transfers and short distance gait training using FWW.   THE FOLLOWING DISCHARGE PLANNING WAS DISCUSSED WITH THE PATIENT/CAREGIVER: Discharge to self under the supervision of MD once patient has met all goals or reached maximum potential

## 2022-03-01 PROCEDURE — 3331090001 HH PPS REVENUE CREDIT

## 2022-03-01 PROCEDURE — 3331090002 HH PPS REVENUE DEBIT

## 2022-03-02 ENCOUNTER — HOME CARE VISIT (OUTPATIENT)
Dept: HOME HEALTH SERVICES | Facility: HOME HEALTH | Age: 87
End: 2022-03-02
Payer: MEDICARE

## 2022-03-02 ENCOUNTER — HOME CARE VISIT (OUTPATIENT)
Dept: SCHEDULING | Facility: HOME HEALTH | Age: 87
End: 2022-03-02
Payer: MEDICARE

## 2022-03-02 PROCEDURE — 3331090002 HH PPS REVENUE DEBIT

## 2022-03-02 PROCEDURE — 3331090001 HH PPS REVENUE CREDIT

## 2022-03-02 PROCEDURE — G0151 HHCP-SERV OF PT,EA 15 MIN: HCPCS

## 2022-03-02 NOTE — HOME HEALTH
SUBJECTIVE: Patient reported she's not feeling well because he R hip and R shoulder are bothering her. Patient reported she's having this pain for a long time now and it comes and goes. She manages her pain by resting. CG reported patient packed her spouse belongings yesterday and its few bags of clothes. CAREGIVER INVOLVEMENT/ASSISTANCE NEEDED FOR: Patient's niece present during this visit and can assist  bathing, dressing, walking,, bathroom, meal prep and setup, medication management,  grocery shopping, household chores, transportation to MD appointment, home exercise program, transferring from bed to chair and transferring to assistive device  [address] ORDERED/DELIVERED THIS VISIT INCLUDE: NA  OBJECTIVE:  See interventions. PATIENT RESPONSE TO TREATMENT:  Patient able to demonstrates HEP which includes seated heel/toes raises, seated kickouts, seated marches, sit to stand with bilateral UE push off, supported heel raises, hip abduction, leg curls using written instruction provided and intermittent cues for proper exercise execution. PATIENT LEVEL OF UNDERSTANDING OF EDUCATION PROVIDED: Patient verbalized understanding of HEP and able to partially teach back using wriiten instruction provided. ASSESSMENT OF PROGRESS TOWARD GOALS: Patient is now safely navigating her home using rollator and demonstrates good compliance. CONTINUED NEED FOR THE FOLLOWING SKILLS: graded therapeutic exercises, balance training, HEP ed, education on energy conservation techniques, gait/steps management training and d/c planning. PLAN FOR NEXT VISIT: dynamic standing balance as patient continues to make her bed by herself. outside home ambulation using rollator. THE FOLLOWING DISCHARGE PLANNING WAS DISCUSSED WITH THE PATIENT/CAREGIVER: Discharge to self under the supervision of MD once patient has met all goals or reached maximum potential. Anticipated last day of therapy on 3/16/22. Patient and cg verbalized agreement.

## 2022-03-03 PROCEDURE — 3331090001 HH PPS REVENUE CREDIT

## 2022-03-03 PROCEDURE — 3331090002 HH PPS REVENUE DEBIT

## 2022-03-04 VITALS
SYSTOLIC BLOOD PRESSURE: 118 MMHG | OXYGEN SATURATION: 99 % | TEMPERATURE: 98.8 F | DIASTOLIC BLOOD PRESSURE: 56 MMHG | HEART RATE: 78 BPM | RESPIRATION RATE: 17 BRPM

## 2022-03-04 PROCEDURE — 3331090001 HH PPS REVENUE CREDIT

## 2022-03-04 PROCEDURE — 3331090002 HH PPS REVENUE DEBIT

## 2022-03-05 PROCEDURE — 3331090001 HH PPS REVENUE CREDIT

## 2022-03-05 PROCEDURE — 3331090002 HH PPS REVENUE DEBIT

## 2022-03-06 PROCEDURE — 3331090001 HH PPS REVENUE CREDIT

## 2022-03-06 PROCEDURE — 3331090002 HH PPS REVENUE DEBIT

## 2022-03-07 PROCEDURE — 3331090002 HH PPS REVENUE DEBIT

## 2022-03-07 PROCEDURE — 3331090001 HH PPS REVENUE CREDIT

## 2022-03-08 PROCEDURE — 3331090002 HH PPS REVENUE DEBIT

## 2022-03-08 PROCEDURE — 3331090001 HH PPS REVENUE CREDIT

## 2022-03-09 ENCOUNTER — HOME CARE VISIT (OUTPATIENT)
Dept: SCHEDULING | Facility: HOME HEALTH | Age: 87
End: 2022-03-09
Payer: MEDICARE

## 2022-03-09 PROCEDURE — 3331090001 HH PPS REVENUE CREDIT

## 2022-03-09 PROCEDURE — G0151 HHCP-SERV OF PT,EA 15 MIN: HCPCS

## 2022-03-09 PROCEDURE — 3331090002 HH PPS REVENUE DEBIT

## 2022-03-10 VITALS
HEART RATE: 71 BPM | RESPIRATION RATE: 16 BRPM | DIASTOLIC BLOOD PRESSURE: 54 MMHG | SYSTOLIC BLOOD PRESSURE: 118 MMHG | TEMPERATURE: 98.6 F | OXYGEN SATURATION: 99 %

## 2022-03-10 PROCEDURE — 3331090001 HH PPS REVENUE CREDIT

## 2022-03-10 PROCEDURE — 3331090002 HH PPS REVENUE DEBIT

## 2022-03-11 PROCEDURE — 3331090002 HH PPS REVENUE DEBIT

## 2022-03-11 PROCEDURE — 3331090001 HH PPS REVENUE CREDIT

## 2022-03-11 NOTE — HOME HEALTH
SUBJECTIVE:  Patient reported today is a good day because her shoulders are not hurting but she stated she has an accident in the bed and will be changing sheets later with her niece Andrey Kramer. Derick Barron CAREGIVER INVOLVEMENT/ASSISTANCE NEEDED FOR: Niece present throughout this visit and reported that she tighten the loose screw in the railing from the steps at the garage. She also brought in clock/ calendar monitor and she placed it in the dining where patient's pill is to help patient to properly take her medications. She stated that patient's step daughter will not able to visit patient for now because step daughter is unwell at the moment. She said she normally comes MWF but now added 1 visit on weekend. Derick Barron HOME HEALTH SUPPLIES BY TYPE AND QUANTITY ORDERED/DELIVERED THIS VISIT INCLUDE: NA  .  MEDICATIONS RECONCILED AND UPDATED: no changes in medications at this time  . OBJECTIVE:  See interventions. Derick Barron PATIENT RESPONSE TO TREATMENT:  PT recommend to reposition chair that is sitting bedside patient's dresser for patient to be able to bring her rollator closer to the bathroom door to decrease risk of falls. She strongly said that she is that chair to put some things and would like the chair to stay where it is. She verbalized she will bring her rollator as close as she can to the bathroom door rather than leaving it infront of the dresser. Derick Barron PATIENT LEVEL OF UNDERSTANDING OF EDUCATION PROVIDED: Patient verbalized understanding of fall prevention recommendations such as proper positioning of rollator when using bathroom, when getting in and out of the bed, not to do laundry by herself, not to change/make bed by herself but continued verbal cues needs for consistent compliance. .    ASSESSMENT OF PROGRESS TOWARD GOALS: Patient is now indep using rollator when performing transfers and household ambulation.  She will continue to require assistance in outside home ambulation, steps management and  IADLs such as housekeeping, medication management, meal preparation, scheduling MD f/u. During this visit, PT recommend assistance of another person few hours during the day to assist patient is ADLS and ADLs. Patient will benefit from using BSC at night to decrease risk of falls when patient use the bathroom at night but patient doesn't have anyone to help her empty the commode on days that niece is not present. PT also discussed life/medical alert system to detech falls and for cgs to have peace of mind whenever they are not with the patient. CG stated she brings it food whenever she's here and she plans to enroll patient to meals on wheels for few days a week. She is also looking for PCA services couple hours/day and said she will call [facility] to ask where to look. She also in agreement with patient having life/medical alert system and said she will discuss it with her and patient's step daughter. .  CONTINUED NEED FOR THE FOLLOWING SKILLS: d/c on next visit to reached max rehab potentials and met goals. Pipo Robin PLAN FOR NEXT VISIT: d/c from [facility] services on next visit. .  THE FOLLOWING DISCHARGE PLANNING WAS DISCUSSED WITH THE PATIENT/CAREGIVER: Patient and cg agreed with d/c on 3/16/22 and patient signed [address].

## 2022-03-12 PROCEDURE — 3331090002 HH PPS REVENUE DEBIT

## 2022-03-12 PROCEDURE — 3331090001 HH PPS REVENUE CREDIT

## 2022-03-13 PROCEDURE — 3331090002 HH PPS REVENUE DEBIT

## 2022-03-13 PROCEDURE — 3331090001 HH PPS REVENUE CREDIT

## 2022-03-14 PROCEDURE — 3331090001 HH PPS REVENUE CREDIT

## 2022-03-14 PROCEDURE — 3331090002 HH PPS REVENUE DEBIT

## 2022-03-15 PROCEDURE — 3331090002 HH PPS REVENUE DEBIT

## 2022-03-15 PROCEDURE — 3331090001 HH PPS REVENUE CREDIT

## 2022-03-16 ENCOUNTER — HOME CARE VISIT (OUTPATIENT)
Dept: SCHEDULING | Facility: HOME HEALTH | Age: 87
End: 2022-03-16
Payer: MEDICARE

## 2022-03-16 PROCEDURE — 3331090003 HH PPS REVENUE ADJ

## 2022-03-16 PROCEDURE — 3331090002 HH PPS REVENUE DEBIT

## 2022-03-16 PROCEDURE — 400013 HH SOC

## 2022-03-16 PROCEDURE — G0151 HHCP-SERV OF PT,EA 15 MIN: HCPCS

## 2022-03-16 PROCEDURE — 3331090001 HH PPS REVENUE CREDIT

## 2022-03-16 NOTE — HOME HEALTH
Patient d/c from PT d/t reached goals and reached max rehab potentials at this time. During this visit, patient presents with the following clinical findings:    SUBJECTIVE: Patient denies fall since evaluation. She stated she had a great weekend and today is good day except for her R big toe. Patient c/o intermittent sore pain on R big toe with highest pain level of 5/10 and least pain level of 0/10. Patient denies taking pain medications. Patient reported her niece will cut her toenails today and cg stated she will also set up for podiatrist appointment. CAREGIVER ASSISTANCE: Niece present throughout this visit. She comes 3-4x/day for few hours and is able to assist with bathing, dressing, walking, bathroom, meal prep and setup, medication management, grocery shopping, household chores, transportation to MD appointment, home exercise program, transferring from bed to chair and transferring to assistive device    MEDICATIONS RECONCILED AND UPDATED: As per cg, patient cardiologist d/c amlodipine, aspirin, atorvastatin, omeprazole. MMT: presents with :  R hip flex 4/5   R hip Abd 4/5   R hip add 4/5   R hip ext. 4/5  R knee flex 4/5  R knee ext. 4/5    R ankle DF 4/5  L hip flex 4/5  L hip Abd 4/5  L hip ext. 4/5    L hip add 4/5  L knee flex 4/5  L knee ext. 4/5  L ankle DF 4/5    FTST indep with bilateral UE push off in 17.32 seconds  compare to : R hip flex 4-/5   R hip Abd 4-/5   R hip add 4-/5   R knee flex 4-/5  R knee ext. 4-/5   R ankle DF 4-/5  L hip flex 4-/5  L hip Abd 4-/5  L hip add 4-/5  L knee flex 4-/5  L knee ext. 4-/5  L ankle DF 4-/5   FTST requires supervision assistance to safely complete sit to stand with bilateral UE push off during initial evaluation. BALANCE: Patient is moderate risk of falls evidenced by  19/28 on Tinetti balance and gait test  compare to 12/28 during initial evaluation.     BED MOBILITY: Patient is now indep in bed mobility without cues from supervision assistance in bed mobility for LE management and intermittent verbal cues for proper techniques during initial evaluation. TRANSFERS: Patient is now indep in transfers to and from bed, chair, toilet and car using rollator without cues from touching assistance in safe transfers to and from bed, chair and toilet using SPC with moderate verbal cues for proper hand placements and techniques during initial evaluation. GAIT: Patient is now indep in household ambulation using rollator and is able to ambulate 150ft with 2 turns with supervision assistance  on even and uneven surfaces using rollator with forward lean compare to 30ft using SPC requiring touching assistance. Impairments in gait include forward lean, downward gaze, decreased hip/knee flexion during swing phase, unequal step length, decreased claire, wide FILI, ataxic gait during initial evaluation. STAIRS: Patient able to negotiates 4 outside steps  without railing with supervision assistance descending and touching assistance descending, non-reciprocal pattern compare to minimal assistance to ascend and descend 4 steps without railing using SPC , non-reciprocal. during initial evaluation. ASSESSMENT: Patient d/c to self with gill as cg and under supervision of MD. Patient has met the goals established at the start of care and instructed to continue to perform HEP and recommend to use rollator for safe transfers and ambulation to prevent falls. Pt verbalized understanding. Patient education provided this visit to include: FALL PREVENTION TECHNIQUES: monitor medication that may alter mental status,  keeping walking pathways clean and clear of clutter and throw rugs, keeping night lights on for ability to see and easily, good visibility for safe transitioning thresholds and proper use and good compliance of using AD.   PAIN MANAGEMENT TECHNIQUES: take pain medication as prescribed by MD, application of cold pack for soreness and achy  type of pain /hot packs for achy, stiffness type of pain on painful area x 20 mins, positioning, and relaxation. PRESSURE ULCER PREVENTION TECHNIQUES:  proper positioning strategies including frequency of repositioning, prevention of shear and friction, appropriate skin hygiene and protection from moisture. ENERGY CONSERVATION TECHNIQUES:  rest, slow down, pacing, and complete tasks in manageable steps. Patient level of understanding of education provided: Patient verbalized understanding, able to partially teach back information provided. Patient response to procedure performed: Patient has been very receptive PT recommendations such as good compliance in using rollator at all times, teach back that she is not supposed to make bed, do laundry, get mails by herself. Patient's cg had arranged meal on wheels 2x/week for patient and patient is very pleased about it. Niece is still working on getting another person to help patient few hours on days she is not with the patient. She is also looking for life alert services that will be appropriate for the patient. Patient/caregiver instructed to contact MD if medical issue arises. Pt/cg verbalized understanding. Care coordination done with PTA regarding POC, progress made and d/c plans. MD notified regarding HHPT d/c.

## 2022-03-18 VITALS
SYSTOLIC BLOOD PRESSURE: 118 MMHG | OXYGEN SATURATION: 99 % | HEART RATE: 78 BPM | RESPIRATION RATE: 16 BRPM | TEMPERATURE: 98.6 F | DIASTOLIC BLOOD PRESSURE: 56 MMHG

## 2022-10-23 ENCOUNTER — APPOINTMENT (OUTPATIENT)
Dept: GENERAL RADIOLOGY | Age: 87
DRG: 871 | End: 2022-10-23
Attending: EMERGENCY MEDICINE
Payer: MEDICARE

## 2022-10-23 ENCOUNTER — APPOINTMENT (OUTPATIENT)
Dept: CT IMAGING | Age: 87
DRG: 871 | End: 2022-10-23
Attending: EMERGENCY MEDICINE
Payer: MEDICARE

## 2022-10-23 ENCOUNTER — HOSPITAL ENCOUNTER (INPATIENT)
Age: 87
LOS: 4 days | Discharge: NURSING, REHAB OR LONG-TERM CARE | DRG: 871 | End: 2022-10-27
Attending: EMERGENCY MEDICINE | Admitting: STUDENT IN AN ORGANIZED HEALTH CARE EDUCATION/TRAINING PROGRAM
Payer: MEDICARE

## 2022-10-23 DIAGNOSIS — U07.1 COVID: Primary | ICD-10-CM

## 2022-10-23 PROBLEM — D72.825 BANDEMIA: Status: ACTIVE | Noted: 2022-10-23

## 2022-10-23 PROBLEM — E05.90 HYPERTHYROIDISM: Status: ACTIVE | Noted: 2022-10-23

## 2022-10-23 PROBLEM — R65.10 SIRS (SYSTEMIC INFLAMMATORY RESPONSE SYNDROME) (HCC): Status: ACTIVE | Noted: 2022-10-23

## 2022-10-23 PROBLEM — R65.20 SEVERE SEPSIS (HCC): Status: ACTIVE | Noted: 2022-10-23

## 2022-10-23 PROBLEM — J96.01 ACUTE RESPIRATORY FAILURE WITH HYPOXIA (HCC): Status: ACTIVE | Noted: 2022-10-23

## 2022-10-23 PROBLEM — A41.9 SEVERE SEPSIS (HCC): Status: ACTIVE | Noted: 2022-10-23

## 2022-10-23 PROBLEM — I48.0 PAF (PAROXYSMAL ATRIAL FIBRILLATION) (HCC): Status: ACTIVE | Noted: 2022-02-09

## 2022-10-23 LAB
ALBUMIN SERPL-MCNC: 3.5 G/DL (ref 3.4–5)
ALBUMIN/GLOB SERPL: 1.2 {RATIO} (ref 0.8–1.7)
ALP SERPL-CCNC: 55 U/L (ref 45–117)
ALT SERPL-CCNC: 18 U/L (ref 13–56)
ANION GAP SERPL CALC-SCNC: 10 MMOL/L (ref 3–18)
APPEARANCE UR: CLEAR
ARTERIAL PATENCY WRIST A: ABNORMAL
AST SERPL-CCNC: 34 U/L (ref 10–38)
BACTERIA URNS QL MICRO: ABNORMAL /HPF
BASE EXCESS BLD CALC-SCNC: 0 MMOL/L
BASE EXCESS BLD CALC-SCNC: 0.8 MMOL/L
BASOPHILS # BLD: 0 K/UL (ref 0–0.1)
BASOPHILS NFR BLD: 0 % (ref 0–2)
BDY SITE: ABNORMAL
BILIRUB SERPL-MCNC: 0.8 MG/DL (ref 0.2–1)
BILIRUB UR QL: NEGATIVE
BUN SERPL-MCNC: 26 MG/DL (ref 7–18)
BUN/CREAT SERPL: 32 (ref 12–20)
CALCIUM SERPL-MCNC: 8.4 MG/DL (ref 8.5–10.1)
CHLORIDE SERPL-SCNC: 100 MMOL/L (ref 100–111)
CK SERPL-CCNC: 431 U/L (ref 26–192)
CO2 SERPL-SCNC: 27 MMOL/L (ref 21–32)
COLOR UR: ABNORMAL
COVID-19 RAPID TEST, COVR: DETECTED
CREAT SERPL-MCNC: 0.81 MG/DL (ref 0.6–1.3)
CRP SERPL-MCNC: 5.9 MG/DL (ref 0–0.3)
D DIMER PPP FEU-MCNC: 0.78 UG/ML(FEU)
DIFFERENTIAL METHOD BLD: ABNORMAL
EOSINOPHIL # BLD: 0 K/UL (ref 0–0.4)
EOSINOPHIL NFR BLD: 0 % (ref 0–5)
EPITH CASTS URNS QL MICRO: ABNORMAL /LPF (ref 0–5)
ERYTHROCYTE [DISTWIDTH] IN BLOOD BY AUTOMATED COUNT: 16.7 % (ref 11.6–14.5)
FOLATE SERPL-MCNC: 18.1 NG/ML (ref 3.1–17.5)
GAS FLOW.O2 O2 DELIVERY SYS: ABNORMAL L/MIN
GLOBULIN SER CALC-MCNC: 3 G/DL (ref 2–4)
GLUCOSE BLD STRIP.AUTO-MCNC: 163 MG/DL (ref 70–110)
GLUCOSE SERPL-MCNC: 184 MG/DL (ref 74–99)
GLUCOSE UR STRIP.AUTO-MCNC: NEGATIVE MG/DL
GRAN CASTS URNS QL MICRO: ABNORMAL /LPF
HCO3 BLD-SCNC: 24.7 MMOL/L (ref 22–26)
HCO3 BLD-SCNC: 26.5 MMOL/L (ref 22–26)
HCT VFR BLD AUTO: 38.1 % (ref 35–45)
HGB BLD-MCNC: 12.5 G/DL (ref 12–16)
HGB UR QL STRIP: ABNORMAL
IMM GRANULOCYTES # BLD AUTO: 0 K/UL
IMM GRANULOCYTES NFR BLD AUTO: 0 %
KETONES UR QL STRIP.AUTO: 15 MG/DL
LACTATE BLD-SCNC: 1.58 MMOL/L (ref 0.4–2)
LACTATE BLD-SCNC: 3.2 MMOL/L (ref 0.4–2)
LEUKOCYTE ESTERASE UR QL STRIP.AUTO: ABNORMAL
LYMPHOCYTES # BLD: 1.2 K/UL (ref 0.9–3.6)
LYMPHOCYTES NFR BLD: 8 % (ref 21–52)
MCH RBC QN AUTO: 36.1 PG (ref 24–34)
MCHC RBC AUTO-ENTMCNC: 32.8 G/DL (ref 31–37)
MCV RBC AUTO: 110.1 FL (ref 78–100)
MONOCYTES # BLD: 0.3 K/UL (ref 0.05–1.2)
MONOCYTES NFR BLD: 2 % (ref 3–10)
NEUTS BAND NFR BLD MANUAL: 14 % (ref 0–5)
NEUTS SEG # BLD: 13.8 K/UL (ref 1.8–8)
NEUTS SEG NFR BLD: 76 % (ref 40–73)
NITRITE UR QL STRIP.AUTO: NEGATIVE
NRBC # BLD: 0 K/UL (ref 0–0.01)
NRBC BLD-RTO: 0 PER 100 WBC
PCO2 BLD: 36.1 MMHG (ref 35–45)
PCO2 BLD: 49.6 MMHG (ref 35–45)
PH BLD: 7.34 [PH] (ref 7.35–7.45)
PH BLD: 7.44 [PH] (ref 7.35–7.45)
PH UR STRIP: 5 [PH] (ref 5–8)
PLATELET # BLD AUTO: 545 K/UL (ref 135–420)
PLATELET COMMENTS,PCOM: ABNORMAL
PMV BLD AUTO: 9.7 FL (ref 9.2–11.8)
PO2 BLD: 26 MMHG (ref 80–100)
PO2 BLD: 70 MMHG (ref 80–100)
POTASSIUM SERPL-SCNC: 4.7 MMOL/L (ref 3.5–5.5)
PROCALCITONIN SERPL-MCNC: 0.34 NG/ML
PROT SERPL-MCNC: 6.5 G/DL (ref 6.4–8.2)
PROT UR STRIP-MCNC: 100 MG/DL
RBC # BLD AUTO: 3.46 M/UL (ref 4.2–5.3)
RBC #/AREA URNS HPF: ABNORMAL /HPF (ref 0–5)
RBC MORPH BLD: ABNORMAL
RBC MORPH BLD: ABNORMAL
SAO2 % BLD: 43.2 % (ref 92–97)
SAO2 % BLD: 94.6 % (ref 92–97)
SERVICE CMNT-IMP: ABNORMAL
SERVICE CMNT-IMP: ABNORMAL
SODIUM SERPL-SCNC: 137 MMOL/L (ref 136–145)
SOURCE, COVRS: ABNORMAL
SP GR UR REFRACTOMETRY: 1.02 (ref 1–1.03)
SPECIMEN TYPE: ABNORMAL
SPECIMEN TYPE: ABNORMAL
TROPONIN-HIGH SENSITIVITY: 28 NG/L (ref 0–54)
TSH SERPL DL<=0.05 MIU/L-ACNC: 0.63 UIU/ML (ref 0.36–3.74)
UROBILINOGEN UR QL STRIP.AUTO: 1 EU/DL (ref 0.2–1)
VIT B12 SERPL-MCNC: 474 PG/ML (ref 211–911)
WBC # BLD AUTO: 15.3 K/UL (ref 4.6–13.2)
WBC URNS QL MICRO: ABNORMAL /HPF (ref 0–4)

## 2022-10-23 PROCEDURE — 96365 THER/PROPH/DIAG IV INF INIT: CPT

## 2022-10-23 PROCEDURE — 87086 URINE CULTURE/COLONY COUNT: CPT

## 2022-10-23 PROCEDURE — 74011250636 HC RX REV CODE- 250/636: Performed by: EMERGENCY MEDICINE

## 2022-10-23 PROCEDURE — 74011000258 HC RX REV CODE- 258: Performed by: PHYSICIAN ASSISTANT

## 2022-10-23 PROCEDURE — 72170 X-RAY EXAM OF PELVIS: CPT

## 2022-10-23 PROCEDURE — 85379 FIBRIN DEGRADATION QUANT: CPT

## 2022-10-23 PROCEDURE — 71045 X-RAY EXAM CHEST 1 VIEW: CPT

## 2022-10-23 PROCEDURE — 36600 WITHDRAWAL OF ARTERIAL BLOOD: CPT

## 2022-10-23 PROCEDURE — 82550 ASSAY OF CK (CPK): CPT

## 2022-10-23 PROCEDURE — 87150 DNA/RNA AMPLIFIED PROBE: CPT

## 2022-10-23 PROCEDURE — 84145 PROCALCITONIN (PCT): CPT

## 2022-10-23 PROCEDURE — 70450 CT HEAD/BRAIN W/O DYE: CPT

## 2022-10-23 PROCEDURE — 82607 VITAMIN B-12: CPT

## 2022-10-23 PROCEDURE — 84443 ASSAY THYROID STIM HORMONE: CPT

## 2022-10-23 PROCEDURE — 94761 N-INVAS EAR/PLS OXIMETRY MLT: CPT

## 2022-10-23 PROCEDURE — 74011000258 HC RX REV CODE- 258: Performed by: EMERGENCY MEDICINE

## 2022-10-23 PROCEDURE — 81001 URINALYSIS AUTO W/SCOPE: CPT

## 2022-10-23 PROCEDURE — 87040 BLOOD CULTURE FOR BACTERIA: CPT

## 2022-10-23 PROCEDURE — 87635 SARS-COV-2 COVID-19 AMP PRB: CPT

## 2022-10-23 PROCEDURE — 85025 COMPLETE CBC W/AUTO DIFF WBC: CPT

## 2022-10-23 PROCEDURE — 84484 ASSAY OF TROPONIN QUANT: CPT

## 2022-10-23 PROCEDURE — 87186 SC STD MICRODIL/AGAR DIL: CPT

## 2022-10-23 PROCEDURE — 74011000250 HC RX REV CODE- 250: Performed by: EMERGENCY MEDICINE

## 2022-10-23 PROCEDURE — 93005 ELECTROCARDIOGRAM TRACING: CPT

## 2022-10-23 PROCEDURE — 96367 TX/PROPH/DG ADDL SEQ IV INF: CPT

## 2022-10-23 PROCEDURE — 99223 1ST HOSP IP/OBS HIGH 75: CPT | Performed by: PHYSICIAN ASSISTANT

## 2022-10-23 PROCEDURE — 82803 BLOOD GASES ANY COMBINATION: CPT

## 2022-10-23 PROCEDURE — 74011250637 HC RX REV CODE- 250/637: Performed by: EMERGENCY MEDICINE

## 2022-10-23 PROCEDURE — 99285 EMERGENCY DEPT VISIT HI MDM: CPT

## 2022-10-23 PROCEDURE — 72125 CT NECK SPINE W/O DYE: CPT

## 2022-10-23 PROCEDURE — 74011000250 HC RX REV CODE- 250: Performed by: PHYSICIAN ASSISTANT

## 2022-10-23 PROCEDURE — 83605 ASSAY OF LACTIC ACID: CPT

## 2022-10-23 PROCEDURE — 74011250636 HC RX REV CODE- 250/636: Performed by: PHYSICIAN ASSISTANT

## 2022-10-23 PROCEDURE — 82962 GLUCOSE BLOOD TEST: CPT

## 2022-10-23 PROCEDURE — 86140 C-REACTIVE PROTEIN: CPT

## 2022-10-23 PROCEDURE — 96375 TX/PRO/DX INJ NEW DRUG ADDON: CPT

## 2022-10-23 PROCEDURE — 65270000046 HC RM TELEMETRY

## 2022-10-23 PROCEDURE — 74011636637 HC RX REV CODE- 636/637: Performed by: PHYSICIAN ASSISTANT

## 2022-10-23 PROCEDURE — XW033E5 INTRODUCTION OF REMDESIVIR ANTI-INFECTIVE INTO PERIPHERAL VEIN, PERCUTANEOUS APPROACH, NEW TECHNOLOGY GROUP 5: ICD-10-PCS | Performed by: HOSPITALIST

## 2022-10-23 PROCEDURE — 87077 CULTURE AEROBIC IDENTIFY: CPT

## 2022-10-23 PROCEDURE — 80053 COMPREHEN METABOLIC PANEL: CPT

## 2022-10-23 RX ORDER — DONEPEZIL HYDROCHLORIDE 5 MG/1
5 TABLET, FILM COATED ORAL
Status: DISCONTINUED | OUTPATIENT
Start: 2022-10-23 | End: 2022-10-28 | Stop reason: HOSPADM

## 2022-10-23 RX ORDER — VANCOMYCIN HYDROCHLORIDE
1250 ONCE
Status: COMPLETED | OUTPATIENT
Start: 2022-10-24 | End: 2022-10-24

## 2022-10-23 RX ORDER — ONDANSETRON 2 MG/ML
4 INJECTION INTRAMUSCULAR; INTRAVENOUS
Status: DISCONTINUED | OUTPATIENT
Start: 2022-10-23 | End: 2022-10-28 | Stop reason: HOSPADM

## 2022-10-23 RX ORDER — KETOROLAC TROMETHAMINE 15 MG/ML
15 INJECTION, SOLUTION INTRAMUSCULAR; INTRAVENOUS
Status: DISCONTINUED | OUTPATIENT
Start: 2022-10-23 | End: 2022-10-25

## 2022-10-23 RX ORDER — METHIMAZOLE 5 MG/1
5 TABLET ORAL DAILY
COMMUNITY
Start: 2022-08-23

## 2022-10-23 RX ORDER — ATORVASTATIN CALCIUM 20 MG/1
20 TABLET, FILM COATED ORAL
Status: DISCONTINUED | OUTPATIENT
Start: 2022-10-23 | End: 2022-10-28 | Stop reason: HOSPADM

## 2022-10-23 RX ORDER — INSULIN LISPRO 100 [IU]/ML
INJECTION, SOLUTION INTRAVENOUS; SUBCUTANEOUS EVERY 6 HOURS
Status: DISCONTINUED | OUTPATIENT
Start: 2022-10-24 | End: 2022-10-24

## 2022-10-23 RX ORDER — POLYETHYLENE GLYCOL 3350 17 G/17G
17 POWDER, FOR SOLUTION ORAL DAILY PRN
Status: DISCONTINUED | OUTPATIENT
Start: 2022-10-23 | End: 2022-10-28 | Stop reason: HOSPADM

## 2022-10-23 RX ORDER — SODIUM CHLORIDE 0.9 % (FLUSH) 0.9 %
5-10 SYRINGE (ML) INJECTION AS NEEDED
Status: DISCONTINUED | OUTPATIENT
Start: 2022-10-23 | End: 2022-10-28 | Stop reason: HOSPADM

## 2022-10-23 RX ORDER — ENOXAPARIN SODIUM 100 MG/ML
40 INJECTION SUBCUTANEOUS EVERY 24 HOURS
Status: DISCONTINUED | OUTPATIENT
Start: 2022-10-23 | End: 2022-10-24 | Stop reason: SDUPTHER

## 2022-10-23 RX ORDER — ACETAMINOPHEN 325 MG/1
650 TABLET ORAL
Status: DISCONTINUED | OUTPATIENT
Start: 2022-10-23 | End: 2022-10-28 | Stop reason: HOSPADM

## 2022-10-23 RX ORDER — SODIUM CHLORIDE 0.9 % (FLUSH) 0.9 %
5-40 SYRINGE (ML) INJECTION AS NEEDED
Status: DISCONTINUED | OUTPATIENT
Start: 2022-10-23 | End: 2022-10-28 | Stop reason: HOSPADM

## 2022-10-23 RX ORDER — DEXTROSE MONOHYDRATE 100 MG/ML
0-250 INJECTION, SOLUTION INTRAVENOUS AS NEEDED
Status: DISCONTINUED | OUTPATIENT
Start: 2022-10-23 | End: 2022-10-28 | Stop reason: HOSPADM

## 2022-10-23 RX ORDER — SODIUM CHLORIDE, SODIUM LACTATE, POTASSIUM CHLORIDE, CALCIUM CHLORIDE 600; 310; 30; 20 MG/100ML; MG/100ML; MG/100ML; MG/100ML
75 INJECTION, SOLUTION INTRAVENOUS CONTINUOUS
Status: DISCONTINUED | OUTPATIENT
Start: 2022-10-23 | End: 2022-10-23

## 2022-10-23 RX ORDER — DEXAMETHASONE SODIUM PHOSPHATE 4 MG/ML
6 INJECTION, SOLUTION INTRA-ARTICULAR; INTRALESIONAL; INTRAMUSCULAR; INTRAVENOUS; SOFT TISSUE EVERY 24 HOURS
Status: DISCONTINUED | OUTPATIENT
Start: 2022-10-24 | End: 2022-10-26

## 2022-10-23 RX ORDER — MONTELUKAST SODIUM 4 MG/1
TABLET, CHEWABLE ORAL
COMMUNITY
Start: 2022-10-20 | End: 2022-10-27

## 2022-10-23 RX ORDER — METHIMAZOLE 5 MG/1
5 TABLET ORAL DAILY
Status: DISCONTINUED | OUTPATIENT
Start: 2022-10-24 | End: 2022-10-28 | Stop reason: HOSPADM

## 2022-10-23 RX ORDER — SODIUM CHLORIDE 0.9 % (FLUSH) 0.9 %
5-40 SYRINGE (ML) INJECTION EVERY 8 HOURS
Status: DISCONTINUED | OUTPATIENT
Start: 2022-10-23 | End: 2022-10-28 | Stop reason: HOSPADM

## 2022-10-23 RX ORDER — DEXTROSE, SODIUM CHLORIDE, SODIUM LACTATE, POTASSIUM CHLORIDE, AND CALCIUM CHLORIDE 5; .6; .31; .03; .02 G/100ML; G/100ML; G/100ML; G/100ML; G/100ML
75 INJECTION, SOLUTION INTRAVENOUS CONTINUOUS
Status: DISCONTINUED | OUTPATIENT
Start: 2022-10-23 | End: 2022-10-25

## 2022-10-23 RX ORDER — MAGNESIUM SULFATE 100 %
4 CRYSTALS MISCELLANEOUS AS NEEDED
Status: DISCONTINUED | OUTPATIENT
Start: 2022-10-23 | End: 2022-10-28 | Stop reason: HOSPADM

## 2022-10-23 RX ORDER — ASPIRIN 81 MG/1
81 TABLET ORAL DAILY
Status: DISCONTINUED | OUTPATIENT
Start: 2022-10-24 | End: 2022-10-28 | Stop reason: HOSPADM

## 2022-10-23 RX ORDER — ACETAMINOPHEN 500 MG
500 TABLET ORAL
Status: DISCONTINUED | OUTPATIENT
Start: 2022-10-23 | End: 2022-10-23 | Stop reason: ALTCHOICE

## 2022-10-23 RX ORDER — ONDANSETRON 4 MG/1
4 TABLET, ORALLY DISINTEGRATING ORAL
Status: DISCONTINUED | OUTPATIENT
Start: 2022-10-23 | End: 2022-10-28 | Stop reason: HOSPADM

## 2022-10-23 RX ADMIN — AZITHROMYCIN DIHYDRATE 500 MG: 500 INJECTION, POWDER, LYOPHILIZED, FOR SOLUTION INTRAVENOUS at 20:00

## 2022-10-23 RX ADMIN — Medication 2 UNITS: at 23:17

## 2022-10-23 RX ADMIN — ACETAMINOPHEN 650 MG: 325 SUPPOSITORY RECTAL at 20:01

## 2022-10-23 RX ADMIN — VANCOMYCIN HYDROCHLORIDE 1250 MG: 10 INJECTION, POWDER, LYOPHILIZED, FOR SOLUTION INTRAVENOUS at 23:47

## 2022-10-23 RX ADMIN — ENOXAPARIN SODIUM 40 MG: 100 INJECTION SUBCUTANEOUS at 22:31

## 2022-10-23 RX ADMIN — PIPERACILLIN AND TAZOBACTAM 4.5 G: 4; .5 INJECTION, POWDER, FOR SOLUTION INTRAVENOUS at 22:31

## 2022-10-23 RX ADMIN — SODIUM CHLORIDE, PRESERVATIVE FREE 10 ML: 5 INJECTION INTRAVENOUS at 22:00

## 2022-10-23 RX ADMIN — CEFTRIAXONE 1 G: 1 INJECTION, POWDER, FOR SOLUTION INTRAMUSCULAR; INTRAVENOUS at 20:00

## 2022-10-23 RX ADMIN — KETOROLAC TROMETHAMINE 15 MG: 15 INJECTION, SOLUTION INTRAMUSCULAR; INTRAVENOUS at 22:47

## 2022-10-23 RX ADMIN — FAMOTIDINE 20 MG: 10 INJECTION, SOLUTION INTRAVENOUS at 22:18

## 2022-10-23 RX ADMIN — SODIUM CHLORIDE, SODIUM LACTATE, POTASSIUM CHLORIDE, CALCIUM CHLORIDE AND DEXTROSE MONOHYDRATE 75 ML/HR: 5; 600; 310; 30; 20 INJECTION, SOLUTION INTRAVENOUS at 22:19

## 2022-10-23 RX ADMIN — DEXAMETHASONE SODIUM PHOSPHATE 10 MG: 4 INJECTION INTRA-ARTICULAR; INTRALESIONAL; INTRAMUSCULAR; INTRAVENOUS; SOFT TISSUE at 21:27

## 2022-10-23 RX ADMIN — REMDESIVIR 200 MG: 100 INJECTION, POWDER, LYOPHILIZED, FOR SOLUTION INTRAVENOUS at 23:06

## 2022-10-23 NOTE — ED TRIAGE NOTES
Unwitnessed fall at home, found by neighbor through window, unknown down time. Lives alone. Per daughter the patient is extremely hard of hearing. Per EMS she was complaining of pain \"all over\" chief complaint headache. Per EMS patient was recently exposed to Herb Aloe on Tuesday. No flu like symptoms. Per EMS 88% on RA, denied SOB, lung sounds clear. Placed on 4L nasal cannula, came up to 96% and headache relieved. All other vitals WNR. No trauma found.

## 2022-10-23 NOTE — Clinical Note
Status[de-identified] INPATIENT [101]   Type of Bed: Telemetry [19]   Cardiac Monitoring Required?: No   Inpatient Hospitalization Certified Necessary for the Following Reasons: 3.  Patient receiving treatment that can only be provided in an inpatient setting (further clarification in H&P documentation)   Admitting Diagnosis: Duncan [[id number]]   Admitting Diagnosis: SIRS (systemic inflammatory response syndrome) [facility]) [[id number]]   Admitting Physician: Elieser Hermosillo [[id number]]   Attending Physician: Elieser Hermosillo [[id number]]   Estimated Length of Stay: 2 Midnights   Discharge Plan[de-identified] Home with Office Follow-up

## 2022-10-23 NOTE — ED PROVIDER NOTES
44-year-old female with multiple medical issues including dementia who lives by herself presents to the emergency department status post fall. We do not know exactly what time the patient fell. Her neighbor saw her on the ground through the window. Contacted her daughter and the daughter contacted EMS. He was reported the patient was hypoxic at 88%. They put her on 4 L that she is at 96%. She told the medic she had a headache but after getting oxygen the headache resolved. Patient is very hard of hearing. History obtained from EMS. Her daughter is reported to be on the way to the hospital.  No other history obtained. Also medic told me that daughter told him a friend visited on Tuesday who had COVID. So the patient has a positive exposure. Past Medical History:   Diagnosis Date    Atypical angina ([address]     Bulging disc 9/21/2010    Cardiac catheterization 10/16/1990    Patent coronary arteries. Normal LV function.  Cardiac echocardiogram 10/02/2012    EF 60%. No WMA. Mild-mod LAE.  Cardiac nuclear imaging test, low risk 10/02/2012    No ischemia or prior infarction. EF 77%. No WMA. Low risk pharm stress test.  Profound vagal response to Lexiscan.  Carotid duplex 01/25/2006    No significant stenosis bilaterally.     Dizziness     Dyspnea     Fecal incontinence     Follow-up exam 9/21/2010    Hematuria     Hepatitis B     Hip pain 4/13/2010    Hypertension     Mixed stress and urge urinary incontinence     Overactive bladder     Spinal stenosis        Past Surgical History:   Procedure Laterality Date    CT ABD PELV W CONT      surgery for diverticulitis    HX APPENDECTOMY      HX BACK SURGERY      HX CHOLECYSTECTOMY      HX HEART CATHETERIZATION  10/16/90    HX ORTHOPAEDIC  [id number]    5 back surgeries    HX ORTHOPAEDIC  3/06    left rotator cuff repair    VA BREAST SURGERY PROCEDURE UNLISTED      both breasts 3 times each         Family History:   Problem Relation Age of Onset    Other Mother         hx of heart disorder    Hypertension Mother     Stroke Mother     Other Father         hx of heart disorder    Hypertension Father     Diabetes Father     Cancer Other        Social History     Socioeconomic History    Marital status:      Spouse name: Not on file    Number of children: Not on file    Years of education: Not on file    Highest education level: Not on file   Occupational History    Not on file   Tobacco Use    Smoking status: Former     Packs/day: 1.00     Years: 6.00     Pack years: 6.00     Types: Cigarettes     Quit date: 1971     Years since quittin.8    Smokeless tobacco: Never   Substance and Sexual Activity    Alcohol use: Not Currently     Alcohol/week: 2.5 standard drinks     Types: 3 Glasses of wine per week    Drug use: No    Sexual activity: Not on file   Other Topics Concern    Not on file   Social History Narrative    Not on file     Social Determinants of Health     Financial Resource Strain: Not on file   Food Insecurity: Not on file   Transportation Needs: Not on file   Physical Activity: Not on file   Stress: Not on file   Social Connections: Not on file   Intimate Partner Violence: Not on file   Housing Stability: Not on file         ALLERGIES: Iodinated contrast media    Review of Systems   Unable to perform ROS: Dementia     There were no vitals filed for this visit. Physical Exam  Vitals and nursing note reviewed. Constitutional:       General: She is not in acute distress. Appearance: Normal appearance. She is not ill-appearing, toxic-appearing or diaphoretic. HENT:      Head: Normocephalic and atraumatic. Nose: Nose normal.      Mouth/Throat:      Mouth: Mucous membranes are moist.   Eyes:      Extraocular Movements: Extraocular movements intact. Neck:      Vascular: No carotid bruit. Cardiovascular:      Rate and Rhythm: Normal rate and regular rhythm.       Pulses: Normal pulses. Heart sounds: Normal heart sounds. No murmur heard. No friction rub. No gallop. Pulmonary:      Effort: Pulmonary effort is normal. No respiratory distress. Breath sounds: No stridor. No wheezing, rhonchi or rales. Chest:      Chest wall: No tenderness. Abdominal:      General: There is no distension. Palpations: Abdomen is soft. There is no mass. Tenderness: There is no abdominal tenderness. There is no right CVA tenderness, left CVA tenderness, guarding or rebound. Hernia: No hernia is present. Musculoskeletal:         General: No swelling, tenderness, deformity or signs of injury. Normal range of motion. Cervical back: Normal range of motion and neck supple. No rigidity or tenderness. Right lower leg: No edema. Left lower leg: No edema. Lymphadenopathy:      Cervical: No cervical adenopathy. Skin:     General: Skin is warm. Coloration: Skin is not jaundiced or pale. Findings: No bruising, erythema, lesion or rash. Neurological:      General: No focal deficit present. Mental Status: She is alert and oriented to person, place, and time. Cranial Nerves: No cranial nerve deficit. Sensory: No sensory deficit. Motor: No weakness. Coordination: Coordination normal.      Gait: Gait normal.      Deep Tendon Reflexes: Reflexes normal.   Psychiatric:         Mood and Affect: Mood normal.         Behavior: Behavior normal.        MDM  Number of Diagnoses or Management Options  Diagnosis management comments: Cussed case with hospitalist who will admit the patient. Her daughter did not arrive to bedside. Patient has sepsis with her fever heart rate and elevated WBCs. For she is not hypoxic I covered her with antibiotics prophylactically because of the reported hypoxemia and fever. Hospitalist asked me to add a D-dimer. Because of her medical history I will admit her to telemetry setting.   Patient is nontoxic no distress.     Differential diagnosis COVID COVID-pneumonia pulmonary embolism sepsis           Procedures

## 2022-10-24 ENCOUNTER — APPOINTMENT (OUTPATIENT)
Dept: GENERAL RADIOLOGY | Age: 87
DRG: 871 | End: 2022-10-24
Attending: INTERNAL MEDICINE
Payer: MEDICARE

## 2022-10-24 PROBLEM — R65.10 SIRS (SYSTEMIC INFLAMMATORY RESPONSE SYNDROME) (HCC): Status: ACTIVE | Noted: 2022-10-24

## 2022-10-24 PROBLEM — I48.91 ATRIAL FIBRILLATION (HCC): Status: ACTIVE | Noted: 2022-10-24

## 2022-10-24 LAB
25(OH)D3 SERPL-MCNC: 26.6 NG/ML (ref 30–100)
ANION GAP SERPL CALC-SCNC: 7 MMOL/L (ref 3–18)
ATRIAL RATE: 108 BPM
ATRIAL RATE: 96 BPM
BASOPHILS # BLD: 0 K/UL (ref 0–0.1)
BASOPHILS NFR BLD: 0 % (ref 0–2)
BUN SERPL-MCNC: 26 MG/DL (ref 7–18)
BUN/CREAT SERPL: 41 (ref 12–20)
CALCIUM SERPL-MCNC: 7.7 MG/DL (ref 8.5–10.1)
CALCULATED P AXIS, ECG09: 83 DEGREES
CALCULATED R AXIS, ECG10: -3 DEGREES
CALCULATED R AXIS, ECG10: 37 DEGREES
CALCULATED T AXIS, ECG11: 59 DEGREES
CALCULATED T AXIS, ECG11: 81 DEGREES
CHLORIDE SERPL-SCNC: 105 MMOL/L (ref 100–111)
CK SERPL-CCNC: 426 U/L (ref 26–192)
CO2 SERPL-SCNC: 26 MMOL/L (ref 21–32)
CREAT SERPL-MCNC: 0.63 MG/DL (ref 0.6–1.3)
CRP SERPL-MCNC: 8.4 MG/DL (ref 0–0.3)
DIAGNOSIS, 93000: NORMAL
DIAGNOSIS, 93000: NORMAL
DIFFERENTIAL METHOD BLD: ABNORMAL
EOSINOPHIL # BLD: 0 K/UL (ref 0–0.4)
EOSINOPHIL NFR BLD: 0 % (ref 0–5)
ERYTHROCYTE [DISTWIDTH] IN BLOOD BY AUTOMATED COUNT: 17.1 % (ref 11.6–14.5)
EST. AVERAGE GLUCOSE BLD GHB EST-MCNC: 103 MG/DL
GLUCOSE BLD STRIP.AUTO-MCNC: 134 MG/DL (ref 70–110)
GLUCOSE BLD STRIP.AUTO-MCNC: 153 MG/DL (ref 70–110)
GLUCOSE BLD STRIP.AUTO-MCNC: 192 MG/DL (ref 70–110)
GLUCOSE SERPL-MCNC: 260 MG/DL (ref 74–99)
HBA1C MFR BLD: 5.2 % (ref 4.2–5.6)
HCT VFR BLD AUTO: 34.4 % (ref 35–45)
HGB BLD-MCNC: 11.3 G/DL (ref 12–16)
IMM GRANULOCYTES # BLD AUTO: 0 K/UL
IMM GRANULOCYTES NFR BLD AUTO: 0 %
LYMPHOCYTES # BLD: 0.6 K/UL (ref 0.9–3.6)
LYMPHOCYTES NFR BLD: 4 % (ref 21–52)
MCH RBC QN AUTO: 36.1 PG (ref 24–34)
MCHC RBC AUTO-ENTMCNC: 32.8 G/DL (ref 31–37)
MCV RBC AUTO: 109.9 FL (ref 78–100)
MONOCYTES # BLD: 0.1 K/UL (ref 0.05–1.2)
MONOCYTES NFR BLD: 1 % (ref 3–10)
NEUTS BAND NFR BLD MANUAL: 6 % (ref 0–5)
NEUTS SEG # BLD: 13.1 K/UL (ref 1.8–8)
NEUTS SEG NFR BLD: 89 % (ref 40–73)
NRBC # BLD: 0 K/UL (ref 0–0.01)
NRBC BLD-RTO: 0 PER 100 WBC
P-R INTERVAL, ECG05: 144 MS
PLATELET # BLD AUTO: 342 K/UL (ref 135–420)
PLATELET COMMENTS,PCOM: ABNORMAL
PMV BLD AUTO: 10 FL (ref 9.2–11.8)
POTASSIUM SERPL-SCNC: 3.6 MMOL/L (ref 3.5–5.5)
Q-T INTERVAL, ECG07: 314 MS
Q-T INTERVAL, ECG07: 316 MS
QRS DURATION, ECG06: 60 MS
QRS DURATION, ECG06: 66 MS
QTC CALCULATION (BEZET), ECG08: 396 MS
QTC CALCULATION (BEZET), ECG08: 442 MS
RBC # BLD AUTO: 3.13 M/UL (ref 4.2–5.3)
RBC MORPH BLD: ABNORMAL
SODIUM SERPL-SCNC: 138 MMOL/L (ref 136–145)
VANCOMYCIN SERPL-MCNC: 8.6 UG/ML (ref 5–40)
VENTRICULAR RATE, ECG03: 118 BPM
VENTRICULAR RATE, ECG03: 96 BPM
WBC # BLD AUTO: 13.8 K/UL (ref 4.6–13.2)

## 2022-10-24 PROCEDURE — 74011250636 HC RX REV CODE- 250/636: Performed by: PHYSICIAN ASSISTANT

## 2022-10-24 PROCEDURE — 97162 PT EVAL MOD COMPLEX 30 MIN: CPT

## 2022-10-24 PROCEDURE — 97166 OT EVAL MOD COMPLEX 45 MIN: CPT

## 2022-10-24 PROCEDURE — 80048 BASIC METABOLIC PNL TOTAL CA: CPT

## 2022-10-24 PROCEDURE — 99232 SBSQ HOSP IP/OBS MODERATE 35: CPT | Performed by: HOSPITALIST

## 2022-10-24 PROCEDURE — 92526 ORAL FUNCTION THERAPY: CPT

## 2022-10-24 PROCEDURE — 74011000250 HC RX REV CODE- 250: Performed by: PHYSICIAN ASSISTANT

## 2022-10-24 PROCEDURE — 83036 HEMOGLOBIN GLYCOSYLATED A1C: CPT

## 2022-10-24 PROCEDURE — 87040 BLOOD CULTURE FOR BACTERIA: CPT

## 2022-10-24 PROCEDURE — 74011000258 HC RX REV CODE- 258: Performed by: PHYSICIAN ASSISTANT

## 2022-10-24 PROCEDURE — 80202 ASSAY OF VANCOMYCIN: CPT

## 2022-10-24 PROCEDURE — 74011636637 HC RX REV CODE- 636/637: Performed by: PHYSICIAN ASSISTANT

## 2022-10-24 PROCEDURE — 86140 C-REACTIVE PROTEIN: CPT

## 2022-10-24 PROCEDURE — 65270000046 HC RM TELEMETRY

## 2022-10-24 PROCEDURE — 85025 COMPLETE CBC W/AUTO DIFF WBC: CPT

## 2022-10-24 PROCEDURE — 97535 SELF CARE MNGMENT TRAINING: CPT

## 2022-10-24 PROCEDURE — 74011000258 HC RX REV CODE- 258: Performed by: STUDENT IN AN ORGANIZED HEALTH CARE EDUCATION/TRAINING PROGRAM

## 2022-10-24 PROCEDURE — 74011250636 HC RX REV CODE- 250/636: Performed by: STUDENT IN AN ORGANIZED HEALTH CARE EDUCATION/TRAINING PROGRAM

## 2022-10-24 PROCEDURE — 97530 THERAPEUTIC ACTIVITIES: CPT

## 2022-10-24 PROCEDURE — 2709999900 HC NON-CHARGEABLE SUPPLY

## 2022-10-24 PROCEDURE — 93005 ELECTROCARDIOGRAM TRACING: CPT

## 2022-10-24 PROCEDURE — 74011000250 HC RX REV CODE- 250: Performed by: EMERGENCY MEDICINE

## 2022-10-24 PROCEDURE — 82306 VITAMIN D 25 HYDROXY: CPT

## 2022-10-24 PROCEDURE — 92610 EVALUATE SWALLOWING FUNCTION: CPT

## 2022-10-24 PROCEDURE — 36415 COLL VENOUS BLD VENIPUNCTURE: CPT

## 2022-10-24 PROCEDURE — 82550 ASSAY OF CK (CPK): CPT

## 2022-10-24 PROCEDURE — 82962 GLUCOSE BLOOD TEST: CPT

## 2022-10-24 PROCEDURE — 71045 X-RAY EXAM CHEST 1 VIEW: CPT

## 2022-10-24 RX ORDER — METOPROLOL TARTRATE 5 MG/5ML
5 INJECTION INTRAVENOUS
Status: DISCONTINUED | OUTPATIENT
Start: 2022-10-24 | End: 2022-10-28 | Stop reason: HOSPADM

## 2022-10-24 RX ORDER — ENOXAPARIN SODIUM 100 MG/ML
1 INJECTION SUBCUTANEOUS EVERY 12 HOURS
Status: DISCONTINUED | OUTPATIENT
Start: 2022-10-24 | End: 2022-10-28 | Stop reason: HOSPADM

## 2022-10-24 RX ORDER — DILTIAZEM HYDROCHLORIDE 5 MG/ML
15 INJECTION INTRAVENOUS
Status: COMPLETED | OUTPATIENT
Start: 2022-10-24 | End: 2022-10-24

## 2022-10-24 RX ORDER — INSULIN LISPRO 100 [IU]/ML
INJECTION, SOLUTION INTRAVENOUS; SUBCUTANEOUS
Status: DISCONTINUED | OUTPATIENT
Start: 2022-10-24 | End: 2022-10-28 | Stop reason: HOSPADM

## 2022-10-24 RX ADMIN — DEXAMETHASONE SODIUM PHOSPHATE 6 MG: 4 INJECTION, SOLUTION INTRAMUSCULAR; INTRAVENOUS at 23:47

## 2022-10-24 RX ADMIN — SODIUM CHLORIDE, PRESERVATIVE FREE 10 ML: 5 INJECTION INTRAVENOUS at 14:00

## 2022-10-24 RX ADMIN — SODIUM CHLORIDE, PRESERVATIVE FREE 10 ML: 5 INJECTION INTRAVENOUS at 23:50

## 2022-10-24 RX ADMIN — DILTIAZEM HYDROCHLORIDE 15 MG: 5 INJECTION, SOLUTION INTRAVENOUS at 02:31

## 2022-10-24 RX ADMIN — SODIUM CHLORIDE 2.5 MG/HR: 900 INJECTION, SOLUTION INTRAVENOUS at 02:32

## 2022-10-24 RX ADMIN — ENOXAPARIN SODIUM 60 MG: 100 INJECTION SUBCUTANEOUS at 23:51

## 2022-10-24 RX ADMIN — Medication 2 UNITS: at 05:19

## 2022-10-24 RX ADMIN — ENOXAPARIN SODIUM 60 MG: 100 INJECTION SUBCUTANEOUS at 09:00

## 2022-10-24 RX ADMIN — SODIUM CHLORIDE, PRESERVATIVE FREE 10 ML: 5 INJECTION INTRAVENOUS at 05:15

## 2022-10-24 RX ADMIN — PIPERACILLIN SODIUM AND TAZOBACTAM SODIUM 3.38 G: 3; .375 INJECTION, POWDER, LYOPHILIZED, FOR SOLUTION INTRAVENOUS at 05:11

## 2022-10-24 RX ADMIN — FAMOTIDINE 20 MG: 10 INJECTION, SOLUTION INTRAVENOUS at 23:48

## 2022-10-24 RX ADMIN — PIPERACILLIN SODIUM AND TAZOBACTAM SODIUM 3.38 G: 3; .375 INJECTION, POWDER, LYOPHILIZED, FOR SOLUTION INTRAVENOUS at 13:00

## 2022-10-24 NOTE — PROGRESS NOTES
Problem: Dysphagia (Adult)  Goal: *Acute Goals and Plan of Care (Insert Text)  Description: Patient will:  1. Tolerate PO trials with 0 s/s overt distress in 4/5 trials  2. Utilize compensatory swallow strategies/maneuvers (decrease bite/sip, size/rate, alt. liq/sol) with min cues in 4/5 trials  3. Perform oral-motor/laryngeal exercises to increase oropharyngeal swallow function with min cues  4. Complete an objective swallow study (i.e., MBSS) to assess swallow integrity, r/o aspiration, and determine of safest LRD, min A    Recommend:   Soft and bite sized diet with honey thick liquids   Meds crushed in puree as able   Aspiration precautions  HOB >45 degrees during all intake and for at least 30 min after intake  Small bites/sips, Feed slowly, alternating bites/sips   Oral care three times daily   Outcome: Progressing Towards Goal     SPEECH LANGUAGE PATHOLOGY BEDSIDE SWALLOW EVALUATION/TREATMENT    Patient: Vanessa Gruber (21 y.o. female)  Date: 10/24/2022  Primary Diagnosis: COVID [U07.1]  SIRS (systemic inflammatory response syndrome) (Former[facility]) [R65.10]  Atrial fibrillation ([facility]) [I48.91]  Precautions: Aspiration     PLOF: As per H&P    ASSESSMENT :  Based on the objective data described below, the patient presents with mild-mod oral and suspected mod pharyngeal dysphagia. Pt alert and oriented to person and place, following commands and denying hx of dysphagia. [facility] ill-fitting dentures; otherwise, structures functional for speech/deglutition. Pt demo immediate wet/weak cough with trials of thin and NTL with decreased laryngeal elevation to palpation. Demo improved tolerance of HTL as no overt s/sx aspiration indicated. Pt demo prolonged/inefficient bolus manipulation with regular solids. Able to masticate/clear soft and bite sized items with no overt s/sx aspiration or distress.   Recommend initiate soft and bite sized diet with HTL, meds crushed in puree with aspiration precautions in place. D/w pt and RN. TREATMENT :  Skilled therapy initiated; Educated pt on aspiration precautions and importance of compensatory swallow techniques to decrease aspiration risk (decrease rate of intake & sip/bite size, upright @HOB for all po intake and ~30 minutes after po). Pt able to verbalize understanding; however, question carryover. Patient will benefit from skilled intervention to address the above impairments. Patient's rehabilitation potential is considered to be Fair  Factors which may influence rehabilitation potential include:   []            None noted  [x]            Mental ability/status  [x]            Medical condition  []            Home/family situation and support systems  [x]            Safety awareness  []            Pain tolerance/management  []            Other:      PLAN :  Recommendations and Planned Interventions:  As above   Frequency/Duration: Patient will be followed by speech-language pathology 1-2 times per day/3-7 days per week to address goals. SUBJECTIVE:   Patient stated \"That's really cold    OBJECTIVE:     Past Medical History:   Diagnosis Date    Atypical angina ([address]     Bulging disc 9/21/2010    Cardiac catheterization 10/16/1990    Patent coronary arteries. Normal LV function. Cardiac echocardiogram 10/02/2012    EF 60%. No WMA. Mild-mod LAE. Cardiac nuclear imaging test, low risk 10/02/2012    No ischemia or prior infarction. EF 77%. No WMA. Low risk pharm stress test.  Profound vagal response to Lexiscan. Carotid duplex 01/25/2006    No significant stenosis bilaterally.     Dizziness     Dyspnea     Fecal incontinence     Follow-up exam 9/21/2010    Hematuria     Hepatitis B     Hip pain 4/13/2010    Hypertension     Hyperthyroidism 10/23/2022    Mixed stress and urge urinary incontinence     Overactive bladder     Spinal stenosis      Past Surgical History:   Procedure Laterality Date    CT ABD PELV W CONT      surgery for diverticulitis    HX APPENDECTOMY      HX BACK SURGERY      HX CHOLECYSTECTOMY      HX HEART CATHETERIZATION  10/16/90    HX ORTHOPAEDIC  [id number]    5 back surgeries    HX ORTHOPAEDIC  3/06    left rotator cuff repair    MS BREAST SURGERY PROCEDURE UNLISTED      both breasts 3 times each     Prior Level of Function/Home Situation: home per chart review   Diet prior to admission: regular per pt report   Current Diet:  NPO   Cognitive and Communication Status:  Neurologic State: Alert  Orientation Level: Oriented to person, Oriented to place  Cognition: Follows commands  Oral Assessment:  Oral Assessment  Labial: No impairment  Dentition:  (Dentures; ill fitting)  Oral Hygiene: Good  Lingual: No impairment  Velum: No impairment  Mandible: No impairment  P.O. Trials:  Patient Position: [facility] at [facility]  Vocal quality prior to P.O.: Low volume  Consistency Presented: Solid; Thin liquid;Honey thick liquid; Nectar thick liquid;Pudding  How Presented: Self-fed/presented;Cup/sip;Spoon;Straw;Successive swallows  Bolus Acceptance: No impairment  Bolus Formation/Control: Impaired  Type of Impairment: Delayed;Poor;Posterior;Mastication  Propulsion: Delayed (# of seconds); Discoordination  Oral Residue: Less than 10% of bolus;10-50% of bolus; Lingual  Initiation of Swallow: Delayed (# of seconds)  Laryngeal Elevation: Decreased;Weak  Aspiration Signs/Symptoms: Weak cough; Change vocal quality  Pharyngeal Phase Characteristics: Altered vocal quality;Poor endurance; Suspected pharyngeal residue  Effective Modifications: Small sips and bites  Cues for Modifications:  Moderate  Oral Phase Severity: Mild-moderate  Pharyngeal Phase Severity : Moderate    PAIN:  Start of Eval: not reported  End of Eval: not reported      After treatment:   []            Patient left in no apparent distress sitting up in chair  [x]            Patient left in no apparent distress in bed  [x]            Call bell left within reach  [x]            Nursing notified  [] Family present  []            Caregiver present  []            Bed alarm activated    COMMUNICATION/EDUCATION:   [x]            Aspiration precautions; swallow safety; compensatory techniques. []            Patient/family have participated as able in goal setting and plan of care. [x]            Patient/family agree to work toward stated goals and plan of care. []            Patient understands intent and goals of therapy; neutral about participation. []            Patient unable to participate in goal setting/plan of care; educ ongoing with interdisciplinary staff  [x]         Posted safety precautions in patient's room.     Thank you for this referral,  Bart Walls M.S., [facility]  Speech-Language Pathologist

## 2022-10-24 NOTE — PROGRESS NOTES
conducted an initial consultation and Spiritual Assessment for Citlali Sharma, who is a 80 y.o.,female. Patients Primary Language is: [place]. According to the patients EMR [facility]ai Affiliation is: [facility].     The reason the Patient came to the hospital is:   Patient Active Problem List    Diagnosis Date Noted    Atrial fibrillation ([address] 10/24/2022    SIRS (systemic inflammatory response syndrome) ([address] 10/24/2022    Severe sepsis ([address] 10/23/2022    COVID 10/23/2022    Bandemia 10/23/2022    Hyperthyroidism 10/23/2022    Acute respiratory failure with hypoxia ([address] 10/23/2022    PAF (paroxysmal atrial fibrillation) ([address] 02/09/2022    Dysarthria 02/09/2022    Chest pain, unspecified 12/12/2012    HTN (hypertension) 10/02/2012    OA (osteoarthritis) 10/02/2012    Spinal stenosis 10/02/2012    Urinary incontinence 10/02/2012    Dementia ([address] 10/02/2012    Dizziness and vertigo in the setting of known labyrinthine disease, 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 Offered prayer and assurance of continued prayers on patient's behalf. Chart reviewed. The following outcomes where achieved: The patient at this time is not able to comprehend, and therefore, complete an Advance Directive because of patients medical condition. See patients diagnosis above. Assessment:  Patient does not have any Jewish/cultural needs that will affect patients preferences in health care. There are no spiritual or Jewish issues which require intervention at this time.      Plan:  Chaplains will continue to follow and will provide pastoral care on an as needed/requested basis.  recommends bedside caregivers page  on duty if patient shows signs of acute spiritual or emotional distress.     [address]  [phone])

## 2022-10-24 NOTE — DIABETES MGMT
Diabetes/ Glycemic Control Plan of Care    Admitted last evening s/p fall - found to be COVID-19 positive. Receiving steroids and D5 infusion. No previous history of DM - A1c 5.2%  Has BG monitoring and corrective humalog ordered   Will continue to monitor    Fasting/ Morning blood glucose:   Lab Results   Component Value Date/Time    Glucose 260 (H) 10/24/2022 03:57 AM    Glucose (POC) 192 (H) 10/24/2022 05:16 AM     IV Fluids containing dextrose: dextrose 5% lactated ringers at 75 ml/hr  Steroids:     Start     Dose Route Frequency Ordered Stop    10/24/22 2100  dexamethasone (DECADRON) 4 mg/mL injection 6 mg         6 mg IV EVERY 24 HOURS 10/23/22 2129 11/02/22 2059             Blood glucose values: Within target range (70-180mg/dL):  no    Current insulin orders: Corrective humalog    Current A1c:   Lab Results   Component Value Date/Time    Hemoglobin A1c 5.2 10/24/2022 03:57 AM      Nutrition/Diet:   Active Orders   Diet    ADULT DIET Dysphagia - Soft & Bite Sized; Moderately Thick (Honey)      Home diabetes medications:    Patient is on no antihyperglycemic meds. Plan/Goals:   Blood glucose will be within target of 70 - 180 mg/dl within 72 hours  Reinforce dietary and medication compliance at home.           Education:  [] Refer to Diabetes Education Record                       [x] Education not indicated at this time     Tawanda Patel MPH RN 1 [facility] [phone]

## 2022-10-24 NOTE — PROGRESS NOTES
Pt noted with HR in 140's. Uncontrolled a-fib. No medications ordered. Hospitalist paged at this time. Awaiting return call.

## 2022-10-24 NOTE — PROGRESS NOTES
Problem: Self Care Deficits Care Plan (Adult)  Goal: *Acute Goals and Plan of Care (Insert Text)  Description: Occupational Therapy Goals  Initiated 10/24/2022 within 7 day(s). 1.  Patient will perform grooming with modified independence. 2.  Patient will perform bathing with modified independence. 3.  Patient will perform upper body dressing and lower body dressing with modified independence. 4.  Patient will perform toilet transfers with modified independence using RW with good balance. 5.  Patient will perform all aspects of toileting with modified independence. 6.  Patient will participate in upper extremity therapeutic exercise/activities with modified independence for 8 minutes. 7.  Patient will utilize energy conservation techniques during functional activities with min verbal cues. Prior Level of Function: pt presents with confusion and unable to provide accurate PLOF, does report she used a RW for functional mobility \"all of the time\"     Outcome: Progressing Towards Goal   OCCUPATIONAL THERAPY EVALUATION    Patient: Barney Schneider (65 y.o. female)  Date: 10/24/2022  Primary Diagnosis: COVID [U07.1]  SIRS (systemic inflammatory response syndrome) ([facility]) [R65.10]  Atrial fibrillation ([address] [I48.91]       Precautions:   Contact, Fall, Other (comment) (droplet+)    ASSESSMENT :  Nursing/RN cleared for pt to participate in OT evaluation and tx session. Patient was seen with PT to maximize patient safety, participation, and functional mobility in preparation for self-care tasks. Patient presents lying semi-reclined in bed, very [place] and appears confused-nursing notified. Bed mobility: Min A supine <-> sit edge of bed. STS with Min A x 2, side stepping towards right with hand held assist and Min A x 2 for simulated bedside commode transfer, pt declines need to toilet.   Patient lying semi reclined in bed, call bell within reach & pt verbalized understanding and provided return demonstration to utilize for assist e.g. functional transfers in order to prevent falls. Patient will benefit from skilled intervention to address the above impairments. Patient's rehabilitation potential is considered to be Good  Factors which may influence rehabilitation potential include:   []             None noted  [x]             Mental ability/status  [x]             Medical condition  [x]             Home/family situation and support systems  []             Safety awareness  []             Pain tolerance/management  []             Other:      PLAN :  Recommendations and Planned Interventions:   [x]               Self Care Training                  [x]      Therapeutic Activities  [x]               Functional Mobility Training   [x]      Cognitive Retraining  [x]               Therapeutic Exercises           [x]      Endurance Activities  [x]               Balance Training                    [x]      Neuromuscular Re-Education  []               Visual/Perceptual Training     [x]      Home Safety Training  [x]               Patient Education                   [x]      Family Training/Education  []               Other (comment):    Frequency/Duration: Patient will be followed by occupational therapy 3-5 times a week to address goals. Further Equipment Recommendations for Discharge: bedside commode, shower chair, and rolling walker    AMPAC:   Current research shows that an AM-PAC score of 17 or less is not associated with a discharge to the patient's home setting. Based on an AM-PAC score of 15/24 and their current ADL deficits; it is recommended that the patient have 3-5 sessions per week of Occupational Therapy at d/c to increase the patient's independence. This AMPAC score should be considered in conjunction with interdisciplinary team recommendations to determine the most appropriate discharge setting.  Patient's social support, diagnosis, medical stability, and prior level of function should also be taken into consideration. SUBJECTIVE:   Patient stated I live with a little boy, he's as cute as a peach.     OBJECTIVE DATA SUMMARY:     Past Medical History:   Diagnosis Date    Atypical angina ([address]     Bulging disc 9/21/2010    Cardiac catheterization 10/16/1990    Patent coronary arteries. Normal LV function. Cardiac echocardiogram 10/02/2012    EF 60%. No WMA. Mild-mod LAE. Cardiac nuclear imaging test, low risk 10/02/2012    No ischemia or prior infarction. EF 77%. No WMA. Low risk pharm stress test.  Profound vagal response to Lexiscan. Carotid duplex 01/25/2006    No significant stenosis bilaterally.     Dizziness     Dyspnea     Fecal incontinence     Follow-up exam 9/21/2010    Hematuria     Hepatitis B     Hip pain 4/13/2010    Hypertension     Hyperthyroidism 10/23/2022    Mixed stress and urge urinary incontinence     Overactive bladder     Spinal stenosis      Past Surgical History:   Procedure Laterality Date    CT ABD PELV W CONT      surgery for diverticulitis    HX APPENDECTOMY      HX BACK SURGERY      HX CHOLECYSTECTOMY      HX HEART CATHETERIZATION  10/16/90    HX ORTHOPAEDIC  [id number]    5 back surgeries    HX ORTHOPAEDIC  3/06    left rotator cuff repair    MA BREAST SURGERY PROCEDURE UNLISTED      both breasts 3 times each     Barriers to Learning/Limitations: yes;  cognitive and altered mental status (i.e.Sedation, Confusion)  Compensate with: visual, verbal, tactile, kinesthetic cues/model    Home Situation:   Home Situation  Home Environment: Private residence  One/Two Story Residence: One story  Living Alone: Yes  Support Systems: Child(cherise)  Patient Expects to be Discharged to[de-identified] Home  Current DME Used/Available at Home: Walker, rolling  []  Right hand dominant   []  Left hand dominant    Cognitive/Behavioral Status:  Neurologic State: Alert;Confused  Orientation Level: Oriented to person  Cognition: Follows commands  Safety/Judgement: Fall prevention    Skin: appears intact  Edema: none noted    Vision/Perceptual:  appears intact    Coordination: BUE  Coordination: Generally decreased, functional  Fine Motor Skills-Upper: Left Intact; Right Intact    Gross Motor Skills-Upper: Left Intact; Right Intact    Balance:  Sitting: Impaired  Sitting - Static: Fair (occasional)  Sitting - Dynamic: Fair (occasional)  Standing: Impaired; With support  Standing - Static: Fair (-)  Standing - Dynamic : Fair (-)    Strength: BUE  Strength: Generally decreased, functional                Tone & Sensation: BUE  Tone: Normal  Range of Motion: BUE  AROM: Generally decreased, functional     Functional Mobility and Transfers for ADLs:  Bed Mobility:     Supine to Sit: Minimum assistance  Sit to Supine: Minimum assistance     Transfers:  Sit to Stand: Minimum assistance;Assist x2  Stand to Sit: Minimum assistance;Assist x2     ADL Assessment:   Feeding: Modified independent    Oral Facial Hygiene/Grooming: Stand-by assistance    Bathing: Minimum assistance    Upper Body Dressing: Minimum assistance    Lower Body Dressing: Minimum assistance    ADL Inte  Cognitive Retraining  Safety/Judgement: Fall prevention      Pain:  Pain level pre-treatment: 0/10   Pain level post-treatment: 0/10     Activity Tolerance:   poor  Please refer to the flowsheet for vital signs taken during this treatment. After treatment:   [] Patient left in no apparent distress sitting up in chair  [x] Patient left in no apparent distress in bed  [x] Call bell left within reach  [x] Nursing notified  [] Caregiver present  [x] Bed alarm activated    COMMUNICATION/EDUCATION:   [x] Role of Occupational Therapy in the acute care setting  [x] Home safety education was provided and the patient/caregiver indicated understanding. [x] Patient/family have participated as able in goal setting and plan of care. [x] Patient/family agree to work toward stated goals and plan of care.   [] Patient understands intent and goals of therapy, but is neutral about his/her participation. [] Patient is unable to participate in goal setting and plan of care. Thank you for this referral.  Crystal Arzola  Time Calculation: 17 mins    Eval Complexity: History: MEDIUM Complexity : Expanded review of history including physical, cognitive and psychosocial  history ; Examination: MEDIUM Complexity : 3-5 performance deficits relating to physical, cognitive , or psychosocial skils that result in activity limitations and / or participation restrictions; Decision Making:MEDIUM Complexity : Patient may present with comorbidities that affect occupational performnce. Miniml to moderate modification of tasks or assistance (eg, physical or verbal ) with assesment(s) is necessary to enable patient to complete evaluation     [facility]-PAC® Daily Activity Inpatient Short Form (6-Clicks)*    How much HELP from another person does the patient currently need    (If the patient hasn't done an activity recently, how much help from another person do you think he/she would need if he/she tried?)   Total (Total A or Dep)   A Lot  (Mod to Max A)   A Little (Sup or Min A)   None (Mod I to I)   Putting on and taking off regular lower body clothing? [] 1 [x] 2 [] 3 [] 4   2. Bathing (including washing, rinsing,      drying)? [] 1 [x] 2 [] 3 [] 4   3. Toileting, which includes using toilet, bedpan or urinal?   [] 1 [x] 2 [] 3 [] 4   4. Putting on and taking off regular upper body clothing? [] 1 [] 2 [x] 3 [] 4   5. Taking care of personal grooming such as brushing teeth? [] 1 [] 2 [x] 3 [] 4   6. Eating meals?    [] 1 [] 2 [x] 3 [] 4

## 2022-10-24 NOTE — PROGRESS NOTES
Received report from NNEKA Vyas at this time. Pt admitted to room 201 via bed transport. Alert and oriented x3. [place]. IV fluids cont. Droplet precautions cont due to positive covid 23. Spoke with daughter in law and update given. Vital signs stable at this time. Oriented to room. Bed alarm on and functioning. Bed noted in lowest position with call bell in reach. Will cont to monitor pt throughout shift.

## 2022-10-24 NOTE — ED NOTES
Spoke with dr. Aleja Durbin regarding cardizem order.  Verbal order rb&v to hold cardizem at this time, will restart for HR >100

## 2022-10-24 NOTE — ACP (ADVANCE CARE PLANNING)
Advance Care Planning   Advance Care Planning Inpatient Note  [address] [facility] Department    Today's Date: 10/24/2022  Unit: [facility] 2S TELEMETRY    Received request from admission screening. Upon review of chart and communication with care team, request Health Care Provider's clarification of patient's decision making capacity and Spiritual Care will defer Advance Care planning with patient at this time. Patient was/were present in the room during visit.       Juan Pablo Ham,  on 10/24/2022 at 1:37 PM

## 2022-10-24 NOTE — ED NOTES
Pt asleep in no distress, lights dimmed. Arousable to voice. Now in afib. Rpt ekg completed and given to md gutierrez. Hospitalist paged.

## 2022-10-24 NOTE — PROGRESS NOTES
Update:  Received a call from the ED nurse, patient went into atrial fibrillation heart rate in the 110 to 130s. Patient with a history of PAF, not on anticoagulation. Vital signs remained stable  Plan:  -Diltiazem IV push follow with infusion, titrate to heart rate less than 110  -Weightbase Lovenox for anticoagulation ordered    Problem list updated.

## 2022-10-24 NOTE — PROGRESS NOTES
[facility] Hospitalist Group  Progress Note    Patient: Citlali Sharma Age: 80 y.o. : 1930 MR#: [id number] SSN: [id number]  Date/Time: 10/24/2022     Subjective:     Pt seen & evaluated - lying in bed, NAD , confused at baseline   Noted H&P from yesterday       Assessment/Plan:     Severe Sepsis - likely 2y to [place] 19, continue IV Abx , ID on board   Acute Hypoxic Resp failure - on 2 lt NC currently , continue to monitor O2 levels   COVID infn - ID on board - continue Remdesvir   Hyperthyroid - continue methimazole   PAF - HR fluctuating , will continue to follow , if no improvement will consult Cardiology for further input. Dysphagia - continue to remain NPO   Dementia - continue supportive care & donepezil. DVT Px - Lovenox   DNR              Lisa Cancino MD  10/24/22      Case discussed with:  []Patient  []Family  []Nursing  []Case Management  DVT Prophylaxis:  []Lovenox  []Hep SQ  []SCDs  []Coumadin   []On Heparin gtt    Objective:   VS: Visit Vitals  /65   Pulse (!) 124   Temp 97.7 °F (36.5 °C)   Resp 17   Ht 5' 7\" (1.702 m)   Wt 55 kg (121 lb 4.1 oz)   SpO2 98%   BMI 18.99 kg/m²      Tmax/24hrs: Temp (24hrs), Av.6 °F (37.6 °C), Min:97.7 °F (36.5 °C), Max:101.4 °F (38.6 °C)  IOBRIEFNo intake or output data in the 24 hours ending 10/24/22 1627    General:  Lying in bed, NAD   Pulmonary: Decreased BS in bases   Cardiovascular: Tachycardic 25  GI:  Soft, Non distended, Non tender. + Bowel sounds. Extremities:  No edema, cyanosis, clubbing. No calf tenderness.    Neurologic: Confused   Additional:    Medications:   Current Facility-Administered Medications   Medication Dose Route Frequency    enoxaparin (LOVENOX) injection 60 mg  1 mg/kg SubCUTAneous Q12H    [START ON 10/25/2022] vancomycin (VANCOCIN) 750 mg in 0.9% sodium chloride 250 mL (VIAL-MATE)  750 mg IntraVENous Q24H    [Held by provider] dilTIAZem (CARDIZEM) 100 mg in 0.9% sodium chloride (MBP/ADV) 100 mL infusion  5 mg/hr IntraVENous CONTINUOUS    insulin lispro (HUMALOG) injection   SubCUTAneous AC&HS    sodium chloride (NS) flush 5-10 mL  5-10 mL IntraVENous PRN    [Held by provider] aspirin delayed-release tablet 81 mg  81 mg Oral DAILY    [Held by provider] atorvastatin (LIPITOR) tablet 20 mg  20 mg Oral QHS    [Held by provider] donepeziL (ARICEPT) tablet 5 mg  5 mg Oral QHS    [Held by provider] methIMAzole (TAPAZOLE) tablet 5 mg  5 mg Oral DAILY    sodium chloride (NS) flush 5-40 mL  5-40 mL IntraVENous Q8H    sodium chloride (NS) flush 5-40 mL  5-40 mL IntraVENous PRN    acetaminophen (TYLENOL) tablet 650 mg  650 mg Oral Q6H PRN    Or    acetaminophen (TYLENOL) suppository 650 mg  650 mg Rectal Q6H PRN    polyethylene glycol (MIRALAX) packet 17 g  17 g Oral DAILY PRN    ondansetron (ZOFRAN ODT) tablet 4 mg  4 mg Oral Q8H PRN    Or    ondansetron (ZOFRAN) injection 4 mg  4 mg IntraVENous Q6H PRN    dexamethasone (DECADRON) 4 mg/mL injection 6 mg  6 mg IntraVENous Q24H    dextrose 5% lactated ringers infusion  75 mL/hr IntraVENous CONTINUOUS    glucose chewable tablet 16 g  4 Tablet Oral PRN    glucagon (GLUCAGEN) injection 1 mg  1 mg IntraMUSCular PRN    dextrose 10% infusion 0-250 mL  0-250 mL IntraVENous PRN    remdesivir 100 mg in 0.9% sodium chloride 250 mL IVPB  100 mg IntraVENous Q24H    famotidine (PF) (PEPCID) 20 mg in 0.9% sodium chloride 10 mL injection  20 mg IntraVENous Q24H    ketorolac (TORADOL) injection 15 mg  15 mg IntraVENous Q6H PRN       Imaging:   XR Results (most recent):  Results from Hospital Encounter encounter on 10/23/22    XR PELV 1 OR 2 V    Narrative  EXAM: XR PELV 1 OR 2 V    CLINICAL INDICATION: Pain all over, unwitnessed fall at home. COMPARISON: 10/13/2008    TECHNIQUE: Single view of the pelvis was obtained. FINDINGS:    BONES: Osteopenia. No definite acute fracture within the limitations of this  single view study.  Advanced degenerative changes of the lower lumbar spine. Moderate degenerative changes at the bilateral SI joints. Mild joint space  narrowing at the bilateral hips. Enthesopathy noted at the right greater than  left greater trochanters. SOFT TISSUES: Catheter projects at the pelvis. Impression  1. No radiographic evidence of acute osseous abnormality. 2. 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 CT Results (most recent):  Results from Hospital Encounter encounter on 10/23/22    CT SPINE CERV WO CONT    Narrative  CT CERVICAL SPINE    REASON FOR EXAM: fall  COMPARISON: Reference to CT soft tissue neck from 12/29/2015, CT cervical spine  12/29/2015. MRI cervical spine 8/8/2013    TECHNIQUE: 2.5 mm helically acquired images from the base of skull to the lung  apex. Sagittal and coronal reformations were obtained for better evaluation of  alignment, disk space height, interfacet relations, and vertebral integrity. All CT scans at this facility are performed using dose optimization technique as  appropriate to a performed exam, to include automated exposure control,  adjustment of the MA and/or kV according to patient size (including appropriate  matching for site-specific examinations) or use of  iterative reconstruction  technique. FINDINGS:    Alignment: Slightly accentuated cervical lordosis, similar findings on the  comparison exams. Trace amount of anterolisthesis of C6 on C7 unchanged. Vertebral body heights: Normal.    Fracture: None. Degenerative changes: Significant multilevel degenerative changes, mostly  involving the facet joints. Autofusion of the right C2-C3 and right C4-C5 facet  joints. Hypertrophic changes in the left C3/C4, right C5/C6 facet joints. There  does not appear to be any critical central spinal canal narrowing. Soft tissues: The prevertebral and paraspinous soft tissues show no acute  findings.  There are multiple small thyroid nodules, decreased in size from 2015  CT. Visualized lung apices: There are areas of pleural/parenchymal scarring in the  lung apices that appears stable from 2015 CT cervical spine. Impression  No findings of acute cervical spine fracture. Multilevel degenerative changes in the cervical spine with some associated mild  anterolisthesis of C6 on C7. Similar findings on the CT from 2015.      02/09/22    ECHO ADULT COMPLETE 02/10/2022 2/10/2022    Interpretation Summary    Left Ventricle: Left ventricle size is normal. Normal wall thickness. Normal wall motion. Normal left ventricular systolic function with a visually estimated EF of 55 - 60%. Normal diastolic function. Interatrial Septum: Agitated saline study was negative with and without provocation. Pulmonary Arteries: Pulmonary hypertension not present. The estimated pulmonary artery systolic pressure is 31 mmHg. Signed by: Daryn Cartwright MD on 2/10/2022 11:15 AM       MRI Results (most recent):  Results from [place] encounter on 02/09/22    MRI BRAIN WO CONT    Narrative  EXAM: MRI of the Head without contrast    INDICATION: rule out cva dysarthria. TECHNIQUE: MRI of the head without IV contrast. Multiplanar multisequence MR  images of the brain without contrast.    IV contrast:  None    COMPARISON: Head CT dated 2/9/2021    FINDINGS: No focus of restricted diffusion appreciated. The ventricles and sulci  are symmetric but moderately enlarged. Moderately extensive white matter  hyperintensities on FLAIR weighted imaging are noted in the periventricular and  subcortical white matter. No midline shift, mass effect, or mass lesion  appreciated. The grey-white junction is intact. No evidence for an acute  infarct identified. No focus of abnormal susceptibility appreciated. The  vascular flow voids are intact. The cerebellopontine angles are unremarkable. The visualized internal auditory canals and semicircular canals are  unremarkable.  The pituitary is normal. The mastoid air cells are unremarkable. The visualized paranasal sinuses are unremarkable. The orbits are unremarkable. The scalp and skull are unremarkable. Impression  1. No acute intracranial process. 2.  Moderately extensive parenchymal volume loss and chronic small vessel  ischemic changes. Labs:    Recent Results (from the past 48 hour(s))   CULTURE, BLOOD    Collection Time: 10/23/22  5:15 PM    Specimen: Blood   Result Value Ref Range    Special Requests: NO SPECIAL REQUESTS      Culture result: NO GROWTH AFTER 11 HOURS     EKG, 12 LEAD, INITIAL    Collection Time: 10/23/22  6:37 PM   Result Value Ref Range    Ventricular Rate 96 BPM    Atrial Rate 96 BPM    P-R Interval 144 ms    QRS Duration 60 ms    Q-T Interval 314 ms    QTC Calculation (Bezet) 396 ms    Calculated P Axis 83 degrees    Calculated R Axis 37 degrees    Calculated T Axis 81 degrees    Diagnosis       Normal sinus rhythm  Septal infarct (cited on or before 09-FEB-2022)  Abnormal ECG  When compared with ECG of 09-FEB-2022 12:49,  Sinus rhythm has replaced Atrial fibrillation  Questionable change in initial forces of Septal leads  Confirmed by Estuardo Mast MD, Edilson Leonardo ([id number]) on 10/24/2022 10:39:32 AM     CULTURE, BLOOD    Collection Time: 10/23/22  6:45 PM    Specimen: Blood   Result Value Ref Range    Special Requests: NO SPECIAL REQUESTS      GRAM STAIN AEROBIC BOTTLE GRAM POSITIVE COCCI IN GROUPS      GRAM STAIN        SMEAR CALLED TO AND CORRECTLY REPEATED BY: NNEKA LOMBARDI,2S,AT 1344 TO 1200 [facility] ON 10/24/22    Culture result: CULTURE IN PROGRESS,FURTHER UPDATES TO FOLLOW      Culture result:        Sent to [facility] for ID/Susceptibility if indicated.    TROPONIN-HIGH SENSITIVITY    Collection Time: 10/23/22  6:45 PM   Result Value Ref Range    Troponin-High Sensitivity 28 0 - 54 ng/L   URINALYSIS W/ RFLX MICROSCOPIC    Collection Time: 10/23/22  6:45 PM   Result Value Ref Range    Color DARK YELLOW      Appearance CLEAR      Specific gravity 1.023 1.005 - 1.030      pH (UA) 5.0 5.0 - 8.0      Protein 100 (A) NEG mg/dL    Glucose Negative NEG mg/dL    Ketone 15 (A) NEG mg/dL    Bilirubin Negative NEG      Blood SMALL (A) NEG      Urobilinogen 1.0 0.2 - 1.0 EU/dL    Nitrites Negative NEG      Leukocyte Esterase TRACE (A) NEG     METABOLIC PANEL, COMPREHENSIVE    Collection Time: 10/23/22  6:45 PM   Result Value Ref Range    Sodium 137 136 - 145 mmol/L    Potassium 4.7 3.5 - 5.5 mmol/L    Chloride 100 100 - 111 mmol/L    CO2 27 21 - 32 mmol/L    Anion gap 10 3.0 - 18 mmol/L    Glucose 184 (H) 74 - 99 mg/dL    BUN 26 (H) 7.0 - 18 MG/DL    Creatinine 0.81 0.6 - 1.3 MG/DL    BUN/Creatinine ratio 32 (H) 12 - 20      eGFR >60 >60 ml/min/1.73m2    Calcium 8.4 (L) 8.5 - 10.1 MG/DL    Bilirubin, total 0.8 0.2 - 1.0 MG/DL    ALT (SGPT) 18 13 - 56 U/L    AST (SGOT) 34 10 - 38 U/L    Alk. phosphatase 55 45 - 117 U/L    Protein, total 6.5 6.4 - 8.2 g/dL    Albumin 3.5 3.4 - 5.0 g/dL    Globulin 3.0 2.0 - 4.0 g/dL    A-G Ratio 1.2 0.8 - 1.7     CBC WITH AUTOMATED DIFF    Collection Time: 10/23/22  6:45 PM   Result Value Ref Range    WBC 15.3 (H) 4.6 - 13.2 K/uL    RBC 3.46 (L) 4.20 - 5.30 M/uL    HGB 12.5 12.0 - 16.0 g/dL    HCT 38.1 35.0 - 45.0 %    .1 (H) 78.0 - 100.0 FL    MCH 36.1 (H) 24.0 - 34.0 PG    MCHC 32.8 31.0 - 37.0 g/dL    RDW 16.7 (H) 11.6 - 14.5 %    PLATELET 791 (H) 198 - 420 K/uL    MPV 9.7 9.2 - 11.8 FL    NRBC 0.0 0  WBC    ABSOLUTE NRBC 0.00 0.00 - 0.01 K/uL    NEUTROPHILS 76 (H) 40 - 73 %    BAND NEUTROPHILS 14 (H) 0 - 5 %    LYMPHOCYTES 8 (L) 21 - 52 %    MONOCYTES 2 (L) 3 - 10 %    EOSINOPHILS 0 0 - 5 %    BASOPHILS 0 0 - 2 %    IMMATURE GRANULOCYTES 0 %    ABS. NEUTROPHILS 13.8 (H) 1.8 - 8.0 K/UL    ABS. LYMPHOCYTES 1.2 0.9 - 3.6 K/UL    ABS. MONOCYTES 0.3 0.05 - 1.2 K/UL    ABS. EOSINOPHILS 0.0 0.0 - 0.4 K/UL    ABS. BASOPHILS 0.0 0.0 - 0.1 K/UL    ABS. IMM.  GRANS. 0.0 K/UL    DF MANUAL      PLATELET COMMENTS Increased Platelets      RBC COMMENTS MACROCYTOSIS  1+        RBC COMMENTS OVALOCYTES  PRESENT       CK    Collection Time: 10/23/22  6:45 PM   Result Value Ref Range     (H) 26 - 192 U/L   URINE MICROSCOPIC ONLY    Collection Time: 10/23/22  6:45 PM   Result Value Ref Range    WBC 3 to 5 0 - 4 /hpf    RBC 0 to 3 0 - 5 /hpf    Epithelial cells FEW 0 - 5 /lpf    Bacteria 2+ (A) NEG /hpf    Granular cast 5 to 7 NEG /lpf   D DIMER    Collection Time: 10/23/22  6:45 PM   Result Value Ref Range    D DIMER 0.78 (H) <0.46 ug/ml(FEU)   TSH 3RD GENERATION    Collection Time: 10/23/22  6:45 PM   Result Value Ref Range    TSH 0.63 0.36 - 3.74 uIU/mL   PROCALCITONIN    Collection Time: 10/23/22  6:45 PM   Result Value Ref Range    Procalcitonin 0.34 ng/mL   C REACTIVE PROTEIN, QT    Collection Time: 10/23/22  6:45 PM   Result Value Ref Range    C-Reactive protein 5.9 (H) 0 - 0.3 mg/dL   VITAMIN B12 & FOLATE    Collection Time: 10/23/22  6:45 PM   Result Value Ref Range    Vitamin B12 474 211 - 911 pg/mL    Folate 18.1 (H) 3.10 - 17.50 ng/mL   BLOOD GAS,LACTIC ACID, POC    Collection Time: 10/23/22  6:53 PM   Result Value Ref Range    pH (POC) 7.34 (L) 7.35 - 7.45      pCO2 (POC) 49.6 (H) 35.0 - 45.0 MMHG    pO2 (POC) 26 (LL) 80 - 100 MMHG    HCO3 (POC) 26.5 (H) 22 - 26 MMOL/L    sO2 (POC) 43.2 (L) 92 - 97 %    Base excess (POC) 0.0 mmol/L    Specimen type (POC) VENOUS BLOOD      Performed by Jodie Hand     Lactic Acid (POC) 3.20 (HH) 0.40 - 2.00 mmol/L   COVID-19 RAPID TEST    Collection Time: 10/23/22  8:06 PM   Result Value Ref Range    Specimen source Nasopharyngeal      COVID-19 rapid test Detected (AA) NOTD     BLOOD GAS, ARTERIAL POC    Collection Time: 10/23/22  8:31 PM   Result Value Ref Range    Device: ROOM AIR      pH (POC) 7.44 7.35 - 7.45      pCO2 (POC) 36.1 35.0 - 45.0 MMHG    pO2 (POC) 70 (L) 80 - 100 MMHG    HCO3 (POC) 24.7 22 - 26 MMOL/L    sO2 (POC) 94.6 92 - 97 %    Base excess (POC) 0.8 mmol/L    Allens test (POC) NOT APPLICABLE      Site RIGHT BRACHIAL      Specimen type (POC) ARTERIAL      Performed by Victoria Sherman    POC LACTIC ACID    Collection Time: 10/23/22 10:42 PM   Result Value Ref Range    Lactic Acid (POC) 1.58 0.40 - 2.00 mmol/L   GLUCOSE, POC    Collection Time: 10/23/22 11:10 PM   Result Value Ref Range    Glucose (POC) 163 (H) 70 - 110 mg/dL   EKG, 12 LEAD, INITIAL    Collection Time: 10/24/22  2:00 AM   Result Value Ref Range    Ventricular Rate 118 BPM    Atrial Rate 108 BPM    QRS Duration 66 ms    Q-T Interval 316 ms    QTC Calculation (Bezet) 442 ms    Calculated R Axis -3 degrees    Calculated T Axis 59 degrees    Diagnosis       Atrial fibrillation with rapid ventricular response  Septal infarct (cited on or before 09-FEB-2022)  Abnormal ECG  Confirmed by Estuardo Mast MD, Edilson Leonardo ([id number]) on 10/24/2022 11:23:55 AM     VITAMIN D, 25 HYDROXY    Collection Time: 10/24/22  3:57 AM   Result Value Ref Range    Vitamin D 25-Hydroxy 26.6 (L) 30 - 100 ng/mL   CBC WITH AUTOMATED DIFF    Collection Time: 10/24/22  3:57 AM   Result Value Ref Range    WBC 13.8 (H) 4.6 - 13.2 K/uL    RBC 3.13 (L) 4.20 - 5.30 M/uL    HGB 11.3 (L) 12.0 - 16.0 g/dL    HCT 34.4 (L) 35.0 - 45.0 %    .9 (H) 78.0 - 100.0 FL    MCH 36.1 (H) 24.0 - 34.0 PG    MCHC 32.8 31.0 - 37.0 g/dL    RDW 17.1 (H) 11.6 - 14.5 %    PLATELET 757 918 - 423 K/uL    MPV 10.0 9.2 - 11.8 FL    NRBC 0.0 0  WBC    ABSOLUTE NRBC 0.00 0.00 - 0.01 K/uL    NEUTROPHILS 89 (H) 40 - 73 %    BAND NEUTROPHILS 6 (H) 0 - 5 %    LYMPHOCYTES 4 (L) 21 - 52 %    MONOCYTES 1 (L) 3 - 10 %    EOSINOPHILS 0 0 - 5 %    BASOPHILS 0 0 - 2 %    IMMATURE GRANULOCYTES 0 %    ABS. NEUTROPHILS 13.1 (H) 1.8 - 8.0 K/UL    ABS. LYMPHOCYTES 0.6 (L) 0.9 - 3.6 K/UL    ABS. MONOCYTES 0.1 0.05 - 1.2 K/UL    ABS. EOSINOPHILS 0.0 0.0 - 0.4 K/UL    ABS. BASOPHILS 0.0 0.0 - 0.1 K/UL    ABS. IMM.  GRANS. 0.0 K/UL    DF MANUAL      PLATELET COMMENTS ADEQUATE PLATELETS RBC COMMENTS OVALOCYTES  FEW       CK    Collection Time: 10/24/22  3:57 AM   Result Value Ref Range     (H) 26 - 192 U/L   C REACTIVE PROTEIN, QT    Collection Time: 10/24/22  3:57 AM   Result Value Ref Range    C-Reactive protein 8.4 (H) 0 - 0.3 mg/dL   HEMOGLOBIN A1C WITH EAG    Collection Time: 10/24/22  3:57 AM   Result Value Ref Range    Hemoglobin A1c 5.2 4.2 - 5.6 %    Est. average glucose 497 mg/dL   METABOLIC PANEL, BASIC    Collection Time: 10/24/22  3:57 AM   Result Value Ref Range    Sodium 138 136 - 145 mmol/L    Potassium 3.6 3.5 - 5.5 mmol/L    Chloride 105 100 - 111 mmol/L    CO2 26 21 - 32 mmol/L    Anion gap 7 3.0 - 18 mmol/L    Glucose 260 (H) 74 - 99 mg/dL    BUN 26 (H) 7.0 - 18 MG/DL    Creatinine 0.63 0.6 - 1.3 MG/DL    BUN/Creatinine ratio 41 (H) 12 - 20      eGFR >60 >60 ml/min/1.73m2    Calcium 7.7 (L) 8.5 - 10.1 MG/DL   GLUCOSE, POC    Collection Time: 10/24/22  5:16 AM   Result Value Ref Range    Glucose (POC) 192 (H) 70 - 110 mg/dL   VANCOMYCIN, RANDOM    Collection Time: 10/24/22  3:45 PM   Result Value Ref Range    Vancomycin, random 8.6 5.0 - 40.0 UG/ML   GLUCOSE, POC    Collection Time: 10/24/22  4:19 PM   Result Value Ref Range    Glucose (POC) 134 (H) 70 - 110 mg/dL       Signed By: Irma Horan MD     October 24, 2022      I spent 25 minutes with the patient in face-to-face consultation, of which greater than 50% was spent in counseling and coordination of care as described above    Disclaimer: Sections of this note are dictated using utilizing voice recognition software. Minor typographical errors may be present. If questions arise, please do not hesitate to contact me or call our department.

## 2022-10-24 NOTE — REMOTE MONITORING
Spoke with primary RN Libby Green regarding 6 hour Sepsis bundle.        Thank Perla Cardona RN, [facility], Sepsis Monitor  [phone]

## 2022-10-24 NOTE — PROGRESS NOTES
Pharmacist Review and Automatic Dose Adjustment of Prophylactic Enoxaparin    The reviewing pharmacist has made an adjustment to the ordered enoxaparin dose or converted to UFH per the approved [facility] protocol and table as identified below. River Clarke is a 80 y.o. female. No lab exists for component: CREATININE    Estimated Creatinine Clearance: 38.5 mL/min (based on SCr of 0.81 mg/dL). Height:   Ht Readings from Last 1 Encounters:   10/23/22 170.2 cm (67\")     Weight:  Wt Readings from Last 1 Encounters:   10/23/22 55 kg (121 lb 4.1 oz)           Plan: Based upon the patient's weight and renal function, the ordered enoxaparin dose of 30 mg SQ q12h has been changed to 40 mg SQ q24h.     Thank you,    Bonifacio Ndiaye, [facility]  10/23/2022

## 2022-10-24 NOTE — H&P
History and Physical          Subjective     HPI: Junior Aguiar is a 80 y.o. female with a PMHx of dementia, hearing loss, angina who presented to the ED with c/o being found down. She is unable to provide further information due to confusion/dementia. Her step daughter, Leticia Blake, provided the majority of the history. Apparently her niece visited on Tuesday and found out she had covid after she left. The patient first showed symptoms on Friday (10/21) which consisted of nasal congestion, but she felt well enough to go out to lunch. Today a neighbor saw her lying on the ground through a window and called EMS. When Leticia Blake arrived, the patient was complaining of back pain and a headache. The headache reportedly resolved after oxygen was applied - SpO2 was 88% initially, but improved to 96% with O2 per EMS. The patient does live alone. Leticia Blake states she is POA and is in agreement with current POST on file. She had her first two COVID vaccines and a booster. In the ED, she is persistently febrile at 101. HR and BP stable. ABG with mild hypoxemia (pO2 70). Lactic 3.2. WBC 15.3, bands 14. UA is not c/w UTI. Ddimer 0.78. Creatinine at baseline, BUN slightly elevated at 26. . Rapid COVID positive. CXR pending. PMHx:  Past Medical History:   Diagnosis Date    Atypical angina ([address]     Bulging disc 9/21/2010    Cardiac catheterization 10/16/1990    Patent coronary arteries. Normal LV function. Cardiac echocardiogram 10/02/2012    EF 60%. No WMA. Mild-mod LAE. Cardiac nuclear imaging test, low risk 10/02/2012    No ischemia or prior infarction. EF 77%. No WMA. Low risk pharm stress test.  Profound vagal response to Lexiscan. Carotid duplex 01/25/2006    No significant stenosis bilaterally.     Dizziness     Dyspnea     Fecal incontinence     Follow-up exam 9/21/2010    Hematuria     Hepatitis B     Hip pain 4/13/2010    Hypertension     Mixed stress and urge urinary incontinence     Overactive bladder     Spinal stenosis        PSurgHx:  Past Surgical History:   Procedure Laterality Date    CT ABD PELV W CONT      surgery for diverticulitis    HX APPENDECTOMY      HX BACK SURGERY      HX CHOLECYSTECTOMY      HX HEART CATHETERIZATION  10/16/90    HX ORTHOPAEDIC  [id number]    5 back surgeries    HX ORTHOPAEDIC  3/06    left rotator cuff repair    WA BREAST SURGERY PROCEDURE UNLISTED      both breasts 3 times each       SocialHx:  Social History     Socioeconomic History    Marital status:    Tobacco Use    Smoking status: Former     Packs/day: 1.00     Years: 6.00     Pack years: 6.00     Types: Cigarettes     Quit date: 1971     Years since quittin.8    Smokeless tobacco: Never   Substance and Sexual Activity    Alcohol use: Not Currently     Alcohol/week: 2.5 standard drinks     Types: 3 Glasses of wine per week    Drug use: No       FamilyHx:  Family History   Problem Relation Age of Onset    Other Mother         hx of heart disorder    Hypertension Mother     Stroke Mother     Other Father         hx of heart disorder    Hypertension Father     Diabetes Father     Cancer Other        Home Medications:  Prior to Admission Medications   Prescriptions Last Dose Informant Taking? amLODIPine (NORVASC) 2.5 mg tablet   No   Sig: Take 1 Tablet by mouth daily. aspirin delayed-release 81 mg tablet   No   Sig: Take 1 Tab by mouth daily. atorvastatin (LIPITOR) 20 mg tablet   No   Sig: Take 1 Tablet by mouth nightly. calcium carbonate (TUMS) 200 mg calcium (500 mg) chew   No   Sig: Take 1 Tablet by mouth three (3) times daily as needed for PRN Reason (Other) (GERD). cholecalciferol (Vitamin D3) 25 mcg (1,000 unit) cap   No   Sig: Take 1,000 Units by mouth daily. colestipoL (COLESTID) 1 gram tablet   No   donepeziL (Aricept) 5 mg tablet   No   Sig: Take 5 mg by mouth nightly. methIMAzole (TAPAZOLE) 5 mg tablet   No   Sig: Take 5 mg by mouth daily.    nitroglycerin (NITROSTAT) 0.4 mg SL tablet   No   Si Tab by SubLINGual route every five (5) minutes as needed. Patient not taking: Reported on 2022   omeprazole (PRILOSEC) 20 mg capsule   No   Sig: Take 20 mg by mouth daily. tolterodine ER (DETROL-LA) 2 mg ER capsule   No   Sig: Take 2 mg by mouth daily. Facility-Administered Medications: None         Allergies: Allergies   Allergen Reactions    Iodinated Contrast Media Swelling     Swollen all over with welps        Review of Systems:  Limited due to dementia. ROS obtained from step daughter    ENT:  + sinus congestion.    PULM: No shortness of breath, no cough   GI: No abdominal pain, no nausea, no vomiting   MSK:  + back pain        Objective     Physical Exam:  Visit Vitals  BP (!) 120/44 (BP 1 Location: Left upper arm, BP Patient Position: Lying)   Pulse 88   Temp (!) 101.4 °F (38.6 °C)   Resp 19   Ht 5' 7\" (1.702 m)   Wt 55 kg (121 lb 4.1 oz)   SpO2 95%   BMI 18.99 kg/m²       General: NAD, appears stated age, alert, [place]  Skin: warm to touch, dry, no rashes  Eyes: PERRL, sclera is non-icteric  HENT: normocephalic/atraumatic, dry mucus membranes  Respiratory: CTA with no signs of respiratory distress  Cardiovascular: RRR, no m/r/g, no cyanosis or peripheral edema of extremities  GI: soft, non-tender, normal bowel sounds  Neuro: moves all extremities, no focal deficits, normal speech  Psych: appropriate mood and affect    Laboratory Studies:  Recent Results (from the past 24 hour(s))   EKG, 12 LEAD, INITIAL    Collection Time: 10/23/22  6:37 PM   Result Value Ref Range    Ventricular Rate 96 BPM    Atrial Rate 96 BPM    P-R Interval 144 ms    QRS Duration 60 ms    Q-T Interval 314 ms    QTC Calculation (Bezet) 396 ms    Calculated P Axis 83 degrees    Calculated R Axis 37 degrees    Calculated T Axis 81 degrees    Diagnosis       Normal sinus rhythm  Septal infarct (cited on or before 2022)  Abnormal ECG  When compared with ECG of 2022 12:49,  Sinus rhythm has replaced Atrial fibrillation  Questionable change in initial forces of Septal leads     TROPONIN-HIGH SENSITIVITY    Collection Time: 10/23/22  6:45 PM   Result Value Ref Range    Troponin-High Sensitivity 28 0 - 54 ng/L   URINALYSIS W/ RFLX MICROSCOPIC    Collection Time: 10/23/22  6:45 PM   Result Value Ref Range    Color DARK YELLOW      Appearance CLEAR      Specific gravity 1.023 1.005 - 1.030      pH (UA) 5.0 5.0 - 8.0      Protein 100 (A) NEG mg/dL    Glucose Negative NEG mg/dL    Ketone 15 (A) NEG mg/dL    Bilirubin Negative NEG      Blood SMALL (A) NEG      Urobilinogen 1.0 0.2 - 1.0 EU/dL    Nitrites Negative NEG      Leukocyte Esterase TRACE (A) NEG     METABOLIC PANEL, COMPREHENSIVE    Collection Time: 10/23/22  6:45 PM   Result Value Ref Range    Sodium 137 136 - 145 mmol/L    Potassium 4.7 3.5 - 5.5 mmol/L    Chloride 100 100 - 111 mmol/L    CO2 27 21 - 32 mmol/L    Anion gap 10 3.0 - 18 mmol/L    Glucose 184 (H) 74 - 99 mg/dL    BUN 26 (H) 7.0 - 18 MG/DL    Creatinine 0.81 0.6 - 1.3 MG/DL    BUN/Creatinine ratio 32 (H) 12 - 20      eGFR >60 >60 ml/min/1.73m2    Calcium 8.4 (L) 8.5 - 10.1 MG/DL    Bilirubin, total 0.8 0.2 - 1.0 MG/DL    ALT (SGPT) 18 13 - 56 U/L    AST (SGOT) 34 10 - 38 U/L    Alk.  phosphatase 55 45 - 117 U/L    Protein, total 6.5 6.4 - 8.2 g/dL    Albumin 3.5 3.4 - 5.0 g/dL    Globulin 3.0 2.0 - 4.0 g/dL    A-G Ratio 1.2 0.8 - 1.7     CBC WITH AUTOMATED DIFF    Collection Time: 10/23/22  6:45 PM   Result Value Ref Range    WBC 15.3 (H) 4.6 - 13.2 K/uL    RBC 3.46 (L) 4.20 - 5.30 M/uL    HGB 12.5 12.0 - 16.0 g/dL    HCT 38.1 35.0 - 45.0 %    .1 (H) 78.0 - 100.0 FL    MCH 36.1 (H) 24.0 - 34.0 PG    MCHC 32.8 31.0 - 37.0 g/dL    RDW 16.7 (H) 11.6 - 14.5 %    PLATELET 175 (H) 720 - 420 K/uL    MPV 9.7 9.2 - 11.8 FL    NRBC 0.0 0  WBC    ABSOLUTE NRBC 0.00 0.00 - 0.01 K/uL    NEUTROPHILS 76 (H) 40 - 73 %    BAND NEUTROPHILS 14 (H) 0 - 5 %    LYMPHOCYTES 8 (L) 21 - 52 % MONOCYTES 2 (L) 3 - 10 %    EOSINOPHILS 0 0 - 5 %    BASOPHILS 0 0 - 2 %    IMMATURE GRANULOCYTES 0 %    ABS. NEUTROPHILS 13.8 (H) 1.8 - 8.0 K/UL    ABS. LYMPHOCYTES 1.2 0.9 - 3.6 K/UL    ABS. MONOCYTES 0.3 0.05 - 1.2 K/UL    ABS. EOSINOPHILS 0.0 0.0 - 0.4 K/UL    ABS. BASOPHILS 0.0 0.0 - 0.1 K/UL    ABS. IMM.  GRANS. 0.0 K/UL    DF MANUAL      PLATELET COMMENTS Increased Platelets      RBC COMMENTS MACROCYTOSIS  1+        RBC COMMENTS OVALOCYTES  PRESENT       CK    Collection Time: 10/23/22  6:45 PM   Result Value Ref Range     (H) 26 - 192 U/L   URINE MICROSCOPIC ONLY    Collection Time: 10/23/22  6:45 PM   Result Value Ref Range    WBC 3 to 5 0 - 4 /hpf    RBC 0 to 3 0 - 5 /hpf    Epithelial cells FEW 0 - 5 /lpf    Bacteria 2+ (A) NEG /hpf    Granular cast 5 to 7 NEG /lpf   D DIMER    Collection Time: 10/23/22  6:45 PM   Result Value Ref Range    D DIMER 0.78 (H) <0.46 ug/ml(FEU)   BLOOD GAS,LACTIC ACID, POC    Collection Time: 10/23/22  6:53 PM   Result Value Ref Range    pH (POC) 7.34 (L) 7.35 - 7.45      pCO2 (POC) 49.6 (H) 35.0 - 45.0 MMHG    pO2 (POC) 26 (LL) 80 - 100 MMHG    HCO3 (POC) 26.5 (H) 22 - 26 MMOL/L    sO2 (POC) 43.2 (L) 92 - 97 %    Base excess (POC) 0.0 mmol/L    Specimen type (POC) VENOUS BLOOD      Performed by Pranay Lyon     Lactic Acid (POC) 3.20 (HH) 0.40 - 2.00 mmol/L   COVID-19 RAPID TEST    Collection Time: 10/23/22  8:06 PM   Result Value Ref Range    Specimen source Nasopharyngeal      COVID-19 rapid test Detected (AA) NOTD     BLOOD GAS, ARTERIAL POC    Collection Time: 10/23/22  8:31 PM   Result Value Ref Range    Device: ROOM AIR      pH (POC) 7.44 7.35 - 7.45      pCO2 (POC) 36.1 35.0 - 45.0 MMHG    pO2 (POC) 70 (L) 80 - 100 MMHG    HCO3 (POC) 24.7 22 - 26 MMOL/L    sO2 (POC) 94.6 92 - 97 %    Base excess (POC) 0.8 mmol/L    Allens test (POC) NOT APPLICABLE      Site RIGHT BRACHIAL      Specimen type (POC) ARTERIAL      Performed by Elvira Chen        Imaging Reviewed:  CT HEAD WO CONT    Result Date: 10/23/2022  CT HEAD WO CONT Reason for Exam: fall Technique: 5 mm axial images acquired through the brain. CT scans at this facility are performed using dose optimization technique as appropriate with performed exam, to include automated exposure control, adjustment of mA and/or kV according to patient's size (including appropriate matching for site-specific examinations), or use of iterative reconstruction technique. Comparison: Brain MRI 2/9/2022, head CT 2/9/2022 Findings: Soft tissues: No significant scalp swelling. Skull base/calvarium: No findings of acute calvarial or skull base fracture. Brain: Global cerebral and cerebellar volume loss similar to prior exams. Ventricles are enlarged, compatible with the brain volumes, similar to the prior exams. No hydrocephalus. Lucency in the periventricular white matter consistent with chronic microvascular ischemic changes seen on the prior exam. Orbits: No acute findings, not closely imaged Paranasal Sinuses: Minimal amount of low-density fluid layering within the sphenoid sinuses. Other findings: None     Negative for acute intracranial trauma. No acute intracranial hemorrhage, no calvarial fracture. Atrophy and moderate chronic microvascular ischemic changes similar to prior exams. Minimal amount of fluid layering within the sphenoid sinuses. No findings of skull base or sinus wall fracture. CT SPINE CERV WO CONT    Result Date: 10/23/2022  CT CERVICAL SPINE REASON FOR EXAM: fall COMPARISON: Reference to CT soft tissue neck from 12/29/2015, CT cervical spine 12/29/2015. MRI cervical spine 8/8/2013 TECHNIQUE: 2.5 mm helically acquired images from the base of skull to the lung apex. Sagittal and coronal reformations were obtained for better evaluation of alignment, disk space height, interfacet relations, and vertebral integrity.  All CT scans at this facility are performed using dose optimization technique as appropriate to a performed exam, to include automated exposure control, adjustment of the MA and/or kV according to patient size (including appropriate matching for site-specific examinations) or use of  iterative reconstruction technique. FINDINGS: Alignment: Slightly accentuated cervical lordosis, similar findings on the comparison exams. Trace amount of anterolisthesis of C6 on C7 unchanged. Vertebral body heights: Normal. Fracture: None. Degenerative changes: Significant multilevel degenerative changes, mostly involving the facet joints. Autofusion of the right C2-C3 and right C4-C5 facet joints. Hypertrophic changes in the left C3/C4, right C5/C6 facet joints. There does not appear to be any critical central spinal canal narrowing. Soft tissues: The prevertebral and paraspinous soft tissues show no acute findings. There are multiple small thyroid nodules, decreased in size from 2015 CT. Visualized lung apices: There are areas of pleural/parenchymal scarring in the lung apices that appears stable from 2015 CT cervical spine. No findings of acute cervical spine fracture. Multilevel degenerative changes in the cervical spine with some associated mild anterolisthesis of C6 on C7. Similar findings on the CT from 2015.           Assessment/Plan     Hospital Problems  Date Reviewed: 8/9/2019            Codes Class Noted POA    * (Principal) Severe sepsis ([facility] ICD-10-CM: A41.9, R65.20  ICD-9-CM: 038.9, 995.92  10/23/2022 Yes        COVID ICD-10-CM: U07.1  ICD-9-CM: 079.89  10/23/2022 Unknown        Bandemia ICD-10-CM: P44.854  ICD-9-CM: 288.66  10/23/2022 Yes        Hyperthyroidism ICD-10-CM: E05.90  ICD-9-CM: 242.90  10/23/2022 Yes        Acute respiratory failure with hypoxia ([facility] ICD-10-CM: J96.01  ICD-9-CM: 518.81  10/23/2022 Yes        PAF (paroxysmal atrial fibrillation) ([facility] ICD-10-CM: I48.0  ICD-9-CM: 427.31  2/9/2022 Yes        Chest pain, unspecified ICD-10-CM: R07.9  ICD-9-CM: 786.50  12/12/2012 Yes        Dementia ([facility] (Chronic) ICD-10-CM: F03.90  ICD-9-CM: 294.20  10/2/2012 Yes           Severe sepsis: (fever, leukocytosis, elevated lactic). UA not c/w UTI. CXR pending. Possible aspiration while down. - spoke to ID, appreciate assistance  - IV fluids  - cardiac monitoring  - IV abx: vanc, zosyn  - f/up cultures  - monitor CBC  - PRN tylenol, toradol for fever    COVID, Acute hypoxemic resp failure: symptoms started 3 days ago, mild hypoxemia on ABG  - CRP pending  - ddimer 0.78, procal 0.34  - start decadron, remdesivir    Dysphagia  - failed bedside swallow eval  - speech therapy consult  - NPO    Hyperthyroid  - cont methimazole, check TSH    PAF  - not on AC or rate controlling medications at home per med rec. Daughter does not know what medications she takes  - cardiac monitoring    Stable angina  - PRN nitro    Dementia: hx of sundowning  - fall precautions  - cont donepezil      Anticipated Discharge: >2 days    DVT Prophylaxis:  [x]Lovenox  []Hep SQ  []SCDs  []Coumadin []DOAC  []On Heparin gtt     I have personally reviewed all pertinent labs, films and EKGs that have officially resulted. I reviewed available electronic documentation outlining the initial presentation as well as the emergency room physician's encounter.    Time spent reviewing records, independently interpreting results, obtaining history from patient or caregiver, performing physical exam, ordering tests and medications, communicating with specialists, documenting in the chart, and coordinating overall care is 75 minutes    Cherrie Britton PA-C  [address] [facility]  Office:  [phone]  Pager: [phone]

## 2022-10-24 NOTE — PROGRESS NOTES
Problem: Mobility Impaired (Adult and Pediatric)  Goal: *Acute Goals and Plan of Care (Insert Text)  Description: Physical Therapy Goals  Initiated 10/24/2022 and to be accomplished within 4 day(s)  1. Patient will move from supine to sit and sit to supine  in bed with modified independence. 2.  Patient will transfer from bed to chair and chair to bed with modified independence using the least restrictive device. 3.  Patient will perform sit to stand with modified independence. 4.  Patient will ambulate with supervision/set-up for 150 feet with the least restrictive device. PLOF: Patient was independent with RW.       10/24/2022 1529 by Juan Pablo Ham PT  Outcome: Progressing Towards Goal  PHYSICAL THERAPY EVALUATION    Patient: Isrrael Machuca (83 y.o. female)  Date: 10/24/2022  Primary Diagnosis: COVID [U07.1]  SIRS (systemic inflammatory response syndrome) (HCC) [R65.10]  Atrial fibrillation (HCC) [I48.91]       Precautions:   Fall, Contact      ASSESSMENT :  Based on the objective data described below, the patient presents with decreased strength, decreased balance, decreased activity tolerance, and decreased independence with mobility. Patient is very hard of hearing. Min A for bed mobility and functional transfers. She has back of legs pressed against the bed for stability during sit to stand min A and B HHA. Patient takes 2 steps laterally along EOB and fatigues quickly. Pt reports she feels weak and returns to bed. Patient will continue to benefit form skilled in the acute care setting and recommend placement upon discharge. Patient will benefit from skilled intervention to address the above impairments.   Patient's rehabilitation potential is considered to be Good  Factors which may influence rehabilitation potential include:   []         None noted  []         Mental ability/status  []         Medical condition  [x]         Home/family situation and support systems  [x]         Safety awareness  []         Pain tolerance/management  []         Other:      PLAN :  Recommendations and Planned Interventions:   [x]           Bed Mobility Training             [x]    Neuromuscular Re-Education  [x]           Transfer Training                   []    Orthotic/Prosthetic Training  [x]           Gait Training                          []    Modalities  [x]           Therapeutic Exercises           []    Edema Management/Control  [x]           Therapeutic Activities            [x]    Family Training/Education  [x]           Patient Education  []           Other (comment):    Frequency/Duration: Patient will be followed by physical therapy 1-2 times per day/4-7 days per week to address goals. Further Equipment Recommendations for Discharge: RW  AMPAC:   Current research shows that an AM-PAC score of 17 (13 without stairs) or less is not associated with a discharge to the patient's home setting. Based on an AM-PAC score of 17/24 (**/20 if omitting stairs) and their current functional mobility deficits, it is recommended that the patient have 3-5 sessions per week of Physical Therapy at d/c to increase the patient's independence. This AMPAC score should be considered in conjunction with interdisciplinary team recommendations to determine the most appropriate discharge setting. Patient's social support, diagnosis, medical stability, and prior level of function should also be taken into consideration. SUBJECTIVE:   Patient stated I can try.     OBJECTIVE DATA SUMMARY:     Past Medical History:   Diagnosis Date    Atypical angina ([address]     Bulging disc 9/21/2010    Cardiac catheterization 10/16/1990    Patent coronary arteries. Normal LV function. Cardiac echocardiogram 10/02/2012    EF 60%. No WMA. Mild-mod LAE. Cardiac nuclear imaging test, low risk 10/02/2012    No ischemia or prior infarction. EF 77%. No WMA. Low risk pharm stress test.  Profound vagal response to Lexiscan. Carotid duplex 01/25/2006    No significant stenosis bilaterally. Dizziness     Dyspnea     Fecal incontinence     Follow-up exam 9/21/2010    Hematuria     Hepatitis B     Hip pain 4/13/2010    Hypertension     Hyperthyroidism 10/23/2022    Mixed stress and urge urinary incontinence     Overactive bladder     Spinal stenosis      Past Surgical History:   Procedure Laterality Date    CT ABD PELV W CONT      surgery for diverticulitis    HX APPENDECTOMY      HX BACK SURGERY      HX CHOLECYSTECTOMY      HX HEART CATHETERIZATION  10/16/90    HX ORTHOPAEDIC  [id number]    5 back surgeries    HX ORTHOPAEDIC  3/06    left rotator cuff repair    [place] BREAST SURGERY PROCEDURE UNLISTED      both breasts 3 times each     Barriers to Learning/Limitations: yes;  altered mental status (i.e.Sedation, Confusion)  Compensate with: Visual Cues, Verbal Cues, and Tactile Cues  Home Situation:  Home Situation  Home Environment: Private residence  One/Two Story Residence: One story  Living Alone: Yes  Support Systems: Child(cherise)  Patient Expects to be Discharged to[de-identified] Home  Current DME Used/Available at Home: Walker, rolling  Critical Behavior:  Neurologic State: Alert;Confused  Orientation Level: Oriented to person  Cognition: Follows commands  Safety/Judgement: Fall prevention  Psychosocial  Patient Behaviors: Calm;Confused  Purposeful Interaction: Yes                 Strength:    Strength: Generally decreased, functional                    Tone & Sensation:   Tone: Normal                              Range Of Motion:  AROM: Generally decreased, functional                 Functional Mobility:  Bed Mobility:     Supine to Sit: Minimum assistance  Sit to Supine: Minimum assistance     Transfers:  Sit to Stand: Minimum assistance;Assist x2  Stand to Sit: Minimum assistance;Assist x2             Balance:   Sitting: Impaired  Sitting - Static: Fair (occasional)  Sitting - Dynamic: Fair (occasional)  Standing: Impaired; With support  Standing - Static: Fair (-)  Standing - Dynamic : Fair (-)    Ambulation/Gait Training:  Distance (ft): 2 Feet (ft)  Assistive Device: Walker, rolling  Ambulation - Level of Assistance: Minimal assistance        Gait Abnormalities: Decreased step clearance;Trunk sway increased             Pain:  Pain level pre-treatment: -/10  body soreness   Pain level post-treatment: -/10   Pain Intervention(s) : Medication (see MAR); Rest, Ice, Repositioning  Response to intervention: Nurse notified, See doc flow    Activity Tolerance:   Fair  Please refer to the flowsheet for vital signs taken during this treatment. After treatment:   []         Patient left in no apparent distress sitting up in chair  [x]         Patient left in no apparent distress in bed  [x]         Call bell left within reach  [x]         Nursing notified  []         Caregiver present  [x]         Bed alarm activated  []         SCDs applied    COMMUNICATION/EDUCATION:   [x]         Role of Physical Therapy in the acute care setting. [x]         Fall prevention education was provided and the patient/caregiver indicated understanding. [x]         Patient/family have participated as able in goal setting and plan of care. [x]         Patient/family agree to work toward stated goals and plan of care. []         Patient understands intent and goals of therapy, but is neutral about his/her participation. []         Patient is unable to participate in goal setting/plan of care: ongoing with therapy staff.  []         Other:     Thank you for this referral.  Tatiana Mcrae, PT   Time Calculation: 17 mins      Eval Complexity: History: MEDIUM  Complexity : 1-2 comorbidities / personal factors will impact the outcome/ POC Exam:MEDIUM Complexity : 3 Standardized tests and measures addressing body structure, function, activity limitation and / or participation in recreation  Presentation: MEDIUM Complexity : Evolving with changing characteristics  Clinical Decision Making:Medium Complexity    Overall Complexity:MEDIUM    [place] AM-PAC® Basic Mobility Inpatient Short Form (6-Clicks) Version 2    How much HELP from another person does the patient currently need    (If the patient hasn't done an activity recently, how much help from another person do you think he/she would need if he/she tried?)   Total (Total A or Dep)   A Lot  (Mod to Max A)   A Little (Sup or Min A)   None (Mod I to I)   Turning from your back to your side while in a flat bed without using bedrails? [] 1 [] 2 [x] 3 [] 4   2. Moving from lying on your back to sitting on the side of a flat bed without using bedrails? [] 1 [] 2 [x] 3 [] 4   3. Moving to and from a bed to a chair (including a wheelchair)? [] 1 [] 2 [x] 3 [] 4   4. Standing up from a chair using your arms (e.g., wheelchair, or bedside chair)? [] 1 [x] 2 [] 3 [] 4   5. Walking in hospital room? [] 1 [] 2 [x] 3 [] 4   6. Climbing 3-5 steps with a railing?+   [] 1 [x] 2 [] 3 [] 4   +If stair climbing cannot be assessed, skip item #6. Sum responses from items 1-5.

## 2022-10-24 NOTE — CONSULTS
Infectious Disease Consultation Note        Reason:sepsis, covid-19 infection     Current abx Prior abx   Zosyn since 10/23  Vancomycin since 10/24 Ceftriaxone, azithromycin on 10/23     Lines:       Assessment :     80 y.o. female with a PMHx of dementia, hearing loss, angina who presented to the ED on 10/23/22 with c/o being found down. Now with fever, covid positive on 10/23, h/o exposure to someone with covid-19 on 10/18, cough/congestion since 10/22, gram positive bacteremia on 10/23/22    Clinical presentation consistent with sepsis-present on admission likely due to acute COVID-19 infection, probable gram-positive bloodstream infection    1 of 2 sets of blood cultures 10/23 positive for gram-positive cocci-could be contaminant. No definitive skin/soft tissue infection as a source of bloodstream infection. Will need to follow-up identification of the blood culture isolate and repeat blood cultures to definitively determine this    Recommendations:    Discontinue Zosyn. Continue vancomycin for now  Continue Decadron, remdesivir  Wean oxygen as tolerated  Obtain post void bladder scan to rule out incomplete bladder emptying  Monitor CBC, temperature, procalcitonin, clinically    Thank you for consultation request. Above plan was discussed in details with RN, and dr aSngeeta Wasserman. Please call me if any further questions or concerns. Will continue to participate in the care of this patient. HPI:     80 y.o. female with a PMHx of dementia, hearing loss, angina who presented to the ED on 10/23/22 with c/o being found down. She is unable to provide further information due to confusion/dementia. She is hard of hearing. Answer some question. Unable to provide detailed history or events which led to her hospital station. Admitting team obtained history from her step daughter, Roberto Carlos Zamudio. Apparently her niece visited on Tuesday and found out she had covid after she left.  The patient first showed symptoms on Friday (10/21) which consisted of nasal congestion, but she felt well enough to go out to lunch. Yesterday a neighbor saw her lying on the ground through a window and called EMS. When Angela Aleman arrived, the patient was complaining of back pain and a headache. The headache reportedly resolved after oxygen was applied - SpO2 was 88% initially, but improved to 96% with O2 per EMS. Patient lives alone at home and was independent in activities of daily living prior to admission. She had her first two COVID vaccines and a booster. In the ED she was noted to have persistent fevers with temperature 101. ABG revealed PO2 70. Lactic acid 3.2, WBC count 15.3, bands 14. Patient was given dose of ceftriaxone and azithromycin in the emergency room. Rapid COVID positive admitted to hospitalist service. I was consulted for further recommendations. I recommended to use Zosyn, vancomycin, Decadron, remdesivir. Today patient states that she feels congested in her chest.  She keeps on repeating that she needs to get up to urinate. As per the nursing staff, he just urinated in bedpan 20 minutes prior to my evaluation. Even during that time she kept on stating that she needs to pee. Detail review of systems not feasible due to patient's mentation/hard of hearing              Past Medical History:   Diagnosis Date    Atypical angina ([address]     Bulging disc 9/21/2010    Cardiac catheterization 10/16/1990    Patent coronary arteries. Normal LV function. Cardiac echocardiogram 10/02/2012    EF 60%. No WMA. Mild-mod LAE. Cardiac nuclear imaging test, low risk 10/02/2012    No ischemia or prior infarction. EF 77%. No WMA. Low risk pharm stress test.  Profound vagal response to Lexiscan. Carotid duplex 01/25/2006    No significant stenosis bilaterally.     Dizziness     Dyspnea     Fecal incontinence     Follow-up exam 9/21/2010    Hematuria     Hepatitis B     Hip pain 4/13/2010    Hypertension     Hyperthyroidism 10/23/2022    Mixed stress and urge urinary incontinence     Overactive bladder     Spinal stenosis        Past Surgical History:   Procedure Laterality Date    CT ABD PELV W CONT      surgery for diverticulitis    HX APPENDECTOMY      HX BACK SURGERY      HX CHOLECYSTECTOMY      HX HEART CATHETERIZATION  10/16/90    HX ORTHOPAEDIC  [id number]    5 back surgeries    HX ORTHOPAEDIC  3/06    left rotator cuff repair    [place] BREAST SURGERY PROCEDURE UNLISTED      both breasts 3 times each       home Medication List      Details   colestipoL (COLESTID) 1 gram tablet       methIMAzole (TAPAZOLE) 5 mg tablet Take 5 mg by mouth daily. cholecalciferol (VITAMIN D3) 25 mcg (1,000 unit) cap Take 1,000 Units by mouth daily. atorvastatin (LIPITOR) 20 mg tablet Take 1 Tablet by mouth nightly. Qty: 30 Tablet, Refills: 0      calcium carbonate (TUMS) 200 mg calcium (500 mg) chew Take 1 Tablet by mouth three (3) times daily as needed for PRN Reason (Other) (GERD). Qty: 20 Tablet, Refills: 0      amLODIPine (NORVASC) 2.5 mg tablet Take 1 Tablet by mouth daily. Qty: 30 Tablet, Refills: 0      tolterodine ER (DETROL-LA) 2 mg ER capsule Take 2 mg by mouth daily. nitroglycerin (NITROSTAT) 0.4 mg SL tablet 1 Tab by SubLINGual route every five (5) minutes as needed. Qty: 25 Tab, Refills: 3      aspirin delayed-release 81 mg tablet Take 1 Tab by mouth daily. Qty: 30 Tab, Refills: 1      omeprazole (PRILOSEC) 20 mg capsule Take 20 mg by mouth daily. donepeziL (ARICEPT) 5 mg tablet Take 5 mg by mouth nightly.              Current Facility-Administered Medications   Medication Dose Route Frequency    enoxaparin (LOVENOX) injection 60 mg  1 mg/kg SubCUTAneous Q12H    [START ON 10/25/2022] vancomycin (VANCOCIN) 750 mg in 0.9% sodium chloride 250 mL (VIAL-MATE)  750 mg IntraVENous Q24H    [Held by provider] dilTIAZem (CARDIZEM) 100 mg in 0.9% sodium chloride (MBP/ADV) 100 mL infusion  5 mg/hr IntraVENous CONTINUOUS insulin lispro (HUMALOG) injection   SubCUTAneous AC&HS    sodium chloride (NS) flush 5-10 mL  5-10 mL IntraVENous PRN    [Held by provider] aspirin delayed-release tablet 81 mg  81 mg Oral DAILY    [Held by provider] atorvastatin (LIPITOR) tablet 20 mg  20 mg Oral QHS    [Held by provider] donepeziL (ARICEPT) tablet 5 mg  5 mg Oral QHS    [Held by provider] methIMAzole (TAPAZOLE) tablet 5 mg  5 mg Oral DAILY    sodium chloride (NS) flush 5-40 mL  5-40 mL IntraVENous Q8H    sodium chloride (NS) flush 5-40 mL  5-40 mL IntraVENous PRN    acetaminophen (TYLENOL) tablet 650 mg  650 mg Oral Q6H PRN    Or    acetaminophen (TYLENOL) suppository 650 mg  650 mg Rectal Q6H PRN    polyethylene glycol (MIRALAX) packet 17 g  17 g Oral DAILY PRN    ondansetron (ZOFRAN ODT) tablet 4 mg  4 mg Oral Q8H PRN    Or    ondansetron (ZOFRAN) injection 4 mg  4 mg IntraVENous Q6H PRN    dexamethasone (DECADRON) 4 mg/mL injection 6 mg  6 mg IntraVENous Q24H    dextrose 5% lactated ringers infusion  75 mL/hr IntraVENous CONTINUOUS    glucose chewable tablet 16 g  4 Tablet Oral PRN    glucagon (GLUCAGEN) injection 1 mg  1 mg IntraMUSCular PRN    dextrose 10% infusion 0-250 mL  0-250 mL IntraVENous PRN    remdesivir 100 mg in 0.9% sodium chloride 250 mL IVPB  100 mg IntraVENous Q24H    famotidine (PF) (PEPCID) 20 mg in 0.9% sodium chloride 10 mL injection  20 mg IntraVENous Q24H    ketorolac (TORADOL) injection 15 mg  15 mg IntraVENous Q6H PRN       Allergies: Iodinated contrast media    Family History   Problem Relation Age of Onset    Other Mother         hx of heart disorder    Hypertension Mother     Stroke Mother     Other Father         hx of heart disorder    Hypertension Father     Diabetes Father     Cancer Other      Social History     Socioeconomic History    Marital status:      Spouse name: Not on file    Number of children: Not on file    Years of education: Not on file    Highest education level: Not on file   Occupational History    Not on file   Tobacco Use    Smoking status: Former     Packs/day: 1.00     Years: 6.00     Pack years: 6.00     Types: Cigarettes     Quit date: 1971     Years since quittin.8    Smokeless tobacco: Never   Substance and Sexual Activity    Alcohol use: Not Currently     Alcohol/week: 2.5 standard drinks     Types: 3 Glasses of wine per week    Drug use: No    Sexual activity: Not on file   Other Topics Concern    Not on file   Social History Narrative    Not on file     Social Determinants of Health     Financial Resource Strain: Not on file   Food Insecurity: Not on file   Transportation Needs: Not on file   Physical Activity: Not on file   Stress: Not on file   Social Connections: Not on file   Intimate Partner Violence: Not on file   Housing Stability: Not on file     Social History     Tobacco Use   Smoking Status Former    Packs/day: 1.00    Years: 6.00    Pack years: 6.00    Types: Cigarettes    Quit date: 1971    Years since quittin.8   Smokeless Tobacco Never        Temp (24hrs), Av.8 °F (37.7 °C), Min:98.1 °F (36.7 °C), Max:101.4 °F (38.6 °C)    Visit Vitals  /83   Pulse (!) 106   Temp 98.4 °F (36.9 °C)   Resp 16   Ht 5' 7\" (1.702 m)   Wt 55 kg (121 lb 4.1 oz)   SpO2 93%   BMI 18.99 kg/m²       ROS: Unable to obtain due to patient factors    Physical Exam:    Vitals signs and nursing note reviewed. Constitutional:       laying on bed, alert, hard of hearing, in no apparent distress, on nasal cannula  HENT:      Head: Normocephalic. Eyes:      Conjunctiva/sclera: Conjunctivae normal.      Neck:      Musculoskeletal: Normal range of motion and neck supple. Cardiovascular:      Rate and Rhythm: Normal rate and regular rhythm on monitor  Chest:      Bilateral chest movements equal.  Auscultation deferred due to Covid positive  Abdominal:      General: There is no distension. Palpations: Abdomen is soft. Tenderness: ? abdominal tenderness.  There is no rebound. Musculoskeletal: Normal range of motion. General: No tenderness. Skin:     General: Skin is warm and dry. Findings: No rash. Neurological:      Mental Status: alert     No gross motor or sensory deficits noted  Psychiatric:         Confused, unable to perform detailed evaluation    Labs: Results:   Chemistry Recent Labs     10/24/22  0357 10/23/22  1845   * 184*    137   K 3.6 4.7    100   CO2 26 27   BUN 26* 26*   CREA 0.63 0.81   CA 7.7* 8.4*   AGAP 7 10   BUCR 41* 32*   AP  --  55   TP  --  6.5   ALB  --  3.5   GLOB  --  3.0   AGRAT  --  1.2      CBC w/Diff Recent Labs     10/24/22  0357 10/23/22  1845   WBC 13.8* 15.3*   RBC 3.13* 3.46*   HGB 11.3* 12.5   HCT 34.4* 38.1    545*   GRANS 89* 76*   LYMPH 4* 8*   EOS 0 0      Microbiology Recent Labs     10/23/22  1845 10/23/22  1715   CULT CULTURE IN PROGRESS,FURTHER UPDATES TO FOLLOW  Sent to [facility] for ID/Susceptibility if indicated. NO GROWTH AFTER 11 HOURS          RADIOLOGY:    All available imaging studies/reports in [facility] for this admission were reviewed      Disclaimer: Sections of this note are dictated utilizing voice recognition software, which may have resulted in some phonetic based errors in grammar and contents. Even though attempts were made to correct all the mistakes, some may have been missed, and remained in the body of the document. If questions arise, please contact our department.     Dr. Bj Arthur, Infectious Disease Specialist  [phone]  October 24, 2022  2:57 PM

## 2022-10-24 NOTE — PROGRESS NOTES
Reason for Renal Dosing:  Per Renal Dosing Policy/Extended Infusion B-Lactam Antibiotics Protocol. Patient clinical status and labs ordered/reviewed. Pt Weight Weight: 55 kg (121 lb 4.1 oz)   Serum Creatinine Lab Results   Component Value Date/Time    Creatinine 0.81 10/23/2022 06:45 PM    Creatinine, POC 0.8 08/04/2013 09:29 AM       Creatinine Clearance Estimated Creatinine Clearance: 38.5 mL/min (based on SCr of 0.81 mg/dL). BUN Lab Results   Component Value Date/Time    BUN 26 (H) 10/23/2022 06:45 PM       WBC Lab Results   Component Value Date/Time    WBC 15.3 (H) 10/23/2022 06:45 PM      Temperature Temp: (!) 101.4 °F (38.6 °C)     HR Pulse (Heart Rate): 88     BP BP: (!) 120/44       Drug type: Antibiotic indicated for Sepsis of Unknown Etiology     Drug/dose: Zosyn 3.375 g IV q6h was adjusted to: 4.5 g IV over 30 min x1, then 3.375 g IV q8h infused over 4 hrs (for eCrCl = 38.5 mL/min) - per Extended Infusion B-Lactam Antibiotics Protocol. Continue to monitor.     Signed Yani Jones [facility]  Date 10/23/2022

## 2022-10-24 NOTE — PROGRESS NOTES
Pharmacy Pharmacokinetic Monitoring Service - Vancomycin     Yaneli Velasco is a 80 y.o. female starting on vancomycin therapy for Sepsis of Unknown Etiology. Pharmacy consulted by Provider: GRETA Rocha for monitoring and adjustment. Target Concentration: Goal AUC/SALEEM 400-600 mg*hr/L    Additional Antimicrobials: Piperacillin/Tazobactam    Pertinent Laboratory Values:   Temp: 98.7 °F (37.1 °C)  Weight: 55 kg (121 lb 4.1 oz)  Recent Labs     10/23/22  1845   CREA 0.81   BUN 26*   WBC 15.3*   PCT 0.34     No results for input(s): VANCP, VANCT, VANCR, VANRA in the last 72 hours. Estimated Creatinine Clearance: 38.5 mL/min (based on SCr of 0.81 mg/dL). Pertinent Cultures:  Date/Time Order Name Specimen Source Lab Status   10/23/22 2006 COVID-19 RAPID TEST Nasopharyngeal Detected   10/23/22 1845 CULTURE, URINE Clean catch In process   10/23/22 1845 CULTURE, BLOOD Blood In process   10/23/22 1715 CULTURE, BLOOD Blood In process   MRSA Nasal Swab: Not ordered. Order placed by pharmacy    Plan:  Dosing recommendations based on Bayesian software  Start vancomycin 1250 mg x1 (given 10/23 2347), then 750 mg IV every 24 hours.   Anticipated AUC of 412 mg/L.hr and trough concentration of 12.6 mg/L at steady state  Renal labs as indicated   Vancomycin 18-hr concentration ordered for  10/24 @ 18:00  Pharmacy will continue to monitor patient and adjust therapy as indicated    Thank you for the consult,  Rik Rojas, [facility]  10/24/2022

## 2022-10-24 NOTE — PROGRESS NOTES
Reason for Renal Dosing:  Per Renal Dosing Policy    Patient clinical status and labs ordered/reviewed. Pt Weight Weight: 55 kg (121 lb 4.1 oz)   Serum Creatinine Lab Results   Component Value Date/Time    Creatinine 0.81 10/23/2022 06:45 PM    Creatinine, POC 0.8 08/04/2013 09:29 AM       Creatinine Clearance Estimated Creatinine Clearance: 38.5 mL/min (based on SCr of 0.81 mg/dL). BUN Lab Results   Component Value Date/Time    BUN 26 (H) 10/23/2022 06:45 PM       WBC Lab Results   Component Value Date/Time    WBC 15.3 (H) 10/23/2022 06:45 PM      Temperature Temp: (!) 101.1 °F (38.4 °C)     HR Pulse (Heart Rate): 89     BP BP: (!) 133/38       Drug type: Non-Antibiotic    Drug/dose: Famotidine 20 mg IV q12h was adjusted to: 20 mg IV q24h (for eCrCl = 38.5 mL/min)    Continue to monitor.     Signed HELADIO Barrera [facility]  Date 10/23/2022

## 2022-10-24 NOTE — PROGRESS NOTES
[address] [facility] Pharmacokinetic Monitoring Service - Vancomycin    Consulting Provider: GRETA Blake   Indication: Sepsis of Unknown Etiology  Target Concentration: Goal AUC/SALEEM 400-600 mg*hr/L  Day of Therapy: 2  Additional Antimicrobials: Ceftriaxone, Azithromycin, Zosyn    Pertinent Laboratory Values: Wt Readings from Last 1 Encounters:   10/23/22 55 kg (121 lb 4.1 oz)     Temp Readings from Last 1 Encounters:   10/24/22 97.7 °F (36.5 °C)     No components found for: PROCAL  Estimated Creatinine Clearance: 44.5 mL/min (by C-G formula based on SCr of 0.63 mg/dL). Recent Labs     10/24/22  0357 10/23/22  1845   WBC 13.8* 15.3*       Pertinent Cultures:  Culture Date Source Results   10/23 Blood x 2 In Process   10/23 Urine In Process   MRSA Nasal Swab: not ordered. Order placed by pharmacy. Inez Cobb     @[employer]@    Assessment:  Date/Time Current Dose Concentration Timing of Concentration (h) AUC   10/23 1250 mg -- -- --   10/24 1000 mg 8.6 ~16 hrs    Note: Serum concentrations collected for AUC dosing may appear elevated if collected in close proximity to the dose administered, this is not necessarily an indication of toxicity    Plan:  Current dosing regimen is sub-therapeutic  Increase dose to 1000 mg q24h  Repeat vancomycin concentration ordered for 10/25 @ 0800   Pharmacy will continue to monitor patient and adjust therapy as indicated    Thank you for the consult,  MADIHA Skelton  10/24/2022 5:00 PM

## 2022-10-25 LAB
ACC. NO. FROM MICRO ORDER, ACCP: ABNORMAL
ACINETOBACTER CALCOACETICUS-BAUMANII COMPLEX, ACBCX: NOT DETECTED
ALBUMIN SERPL-MCNC: 2.2 G/DL (ref 3.4–5)
ALBUMIN/GLOB SERPL: 0.7 {RATIO} (ref 0.8–1.7)
ALP SERPL-CCNC: 43 U/L (ref 45–117)
ALT SERPL-CCNC: 15 U/L (ref 13–56)
ANION GAP SERPL CALC-SCNC: 4 MMOL/L (ref 3–18)
AST SERPL-CCNC: 29 U/L (ref 10–38)
BACTEROIDES FRAGILIS, BFRA: NOT DETECTED
BASOPHILS # BLD: 0 K/UL (ref 0–0.1)
BASOPHILS NFR BLD: 0 % (ref 0–2)
BILIRUB SERPL-MCNC: 0.5 MG/DL (ref 0.2–1)
BIOFIRE COMMENT, BCIDPF: ABNORMAL
BUN SERPL-MCNC: 27 MG/DL (ref 7–18)
BUN/CREAT SERPL: 55 (ref 12–20)
C GLABRATA DNA VAG QL NAA+PROBE: NOT DETECTED
CALCIUM SERPL-MCNC: 8 MG/DL (ref 8.5–10.1)
CANDIDA ALBICANS: NOT DETECTED
CANDIDA AURIS, CAAU: NOT DETECTED
CANDIDA KRUSEI, CKRP: NOT DETECTED
CANDIDA PARAPSILOSIS, CPAUP: NOT DETECTED
CANDIDA TROPICALIS, CTROP: NOT DETECTED
CHLORIDE SERPL-SCNC: 110 MMOL/L (ref 100–111)
CO2 SERPL-SCNC: 27 MMOL/L (ref 21–32)
CREAT SERPL-MCNC: 0.49 MG/DL (ref 0.6–1.3)
CRP SERPL-MCNC: 10.1 MG/DL (ref 0–0.3)
CRYPTO NEOFORMANS/GATTII, CRYNEG: NOT DETECTED
DIFFERENTIAL METHOD BLD: ABNORMAL
ENTEROBACTER CLOACAE COMPLEX, ECCP: NOT DETECTED
ENTEROBACTERALES SP. , ENBLS: NOT DETECTED
ENTEROCOCCUS FAECALIS, ENFA: NOT DETECTED
ENTEROCOCCUS FAECIUM, ENFAM: NOT DETECTED
EOSINOPHIL # BLD: 0 K/UL (ref 0–0.4)
EOSINOPHIL NFR BLD: 0 % (ref 0–5)
ERYTHROCYTE [DISTWIDTH] IN BLOOD BY AUTOMATED COUNT: 17.3 % (ref 11.6–14.5)
ESCHERICHIA COLI: NOT DETECTED
GLOBULIN SER CALC-MCNC: 3 G/DL (ref 2–4)
GLUCOSE BLD STRIP.AUTO-MCNC: 134 MG/DL (ref 70–110)
GLUCOSE BLD STRIP.AUTO-MCNC: 177 MG/DL (ref 70–110)
GLUCOSE BLD STRIP.AUTO-MCNC: 196 MG/DL (ref 70–110)
GLUCOSE BLD STRIP.AUTO-MCNC: 275 MG/DL (ref 70–110)
GLUCOSE SERPL-MCNC: 170 MG/DL (ref 74–99)
HAEMOPHILUS INFLUENZAE, HMI: NOT DETECTED
HCT VFR BLD AUTO: 34.8 % (ref 35–45)
HGB BLD-MCNC: 11.4 G/DL (ref 12–16)
IMM GRANULOCYTES # BLD AUTO: 0 K/UL
IMM GRANULOCYTES NFR BLD AUTO: 0 %
KLEBSIELLA AEROGENES, KLAE: NOT DETECTED
KLEBSIELLA OXYTOCA: NOT DETECTED
KLEBSIELLA PNEUMONIAE GROUP, KPPG: NOT DETECTED
LISTERIA MONOCYTOGENES, LMONP: NOT DETECTED
LYMPHOCYTES # BLD: 0.5 K/UL (ref 0.9–3.6)
LYMPHOCYTES NFR BLD: 4 % (ref 21–52)
MCH RBC QN AUTO: 36.1 PG (ref 24–34)
MCHC RBC AUTO-ENTMCNC: 32.8 G/DL (ref 31–37)
MCV RBC AUTO: 110.1 FL (ref 78–100)
MONOCYTES # BLD: 0.1 K/UL (ref 0.05–1.2)
MONOCYTES NFR BLD: 1 % (ref 3–10)
NEISSERIA MENINGITIDIS, NMNI: NOT DETECTED
NEUTS BAND NFR BLD MANUAL: 3 % (ref 0–5)
NEUTS SEG # BLD: 12.9 K/UL (ref 1.8–8)
NEUTS SEG NFR BLD: 92 % (ref 40–73)
NRBC # BLD: 0 K/UL (ref 0–0.01)
NRBC BLD-RTO: 0 PER 100 WBC
PLATELET # BLD AUTO: 355 K/UL (ref 135–420)
PLATELET COMMENTS,PCOM: ABNORMAL
PMV BLD AUTO: 10 FL (ref 9.2–11.8)
POTASSIUM SERPL-SCNC: 3.8 MMOL/L (ref 3.5–5.5)
PROT SERPL-MCNC: 5.2 G/DL (ref 6.4–8.2)
PROTEUS, PRP: NOT DETECTED
PSEUDOMONAS AERUGINOSA: NOT DETECTED
RBC # BLD AUTO: 3.16 M/UL (ref 4.2–5.3)
RBC MORPH BLD: ABNORMAL
RBC MORPH BLD: ABNORMAL
RESISTANT GENE SPACE, REGENE: ABNORMAL
SALMONELLA, SALMO: NOT DETECTED
SERRATIA MARCESCENS: NOT DETECTED
SODIUM SERPL-SCNC: 141 MMOL/L (ref 136–145)
STAPH EPIDERMIDIS, STEP: NOT DETECTED
STAPH LUGDUNENSIS, STALUG: NOT DETECTED
STAPHYLOCOCCUS AUREUS: NOT DETECTED
STAPHYLOCOCCUS, STAPP: DETECTED
STENO MALTOPHILIA, STMA: NOT DETECTED
STREPTOCOCCUS , STPSP: NOT DETECTED
STREPTOCOCCUS AGALACTIAE (GROUP B): NOT DETECTED
STREPTOCOCCUS PNEUMONIAE , SPNP: NOT DETECTED
STREPTOCOCCUS PYOGENES (GROUP A), SPYOP: NOT DETECTED
VANCOMYCIN SERPL-MCNC: 11.9 UG/ML (ref 5–40)
WBC # BLD AUTO: 13.5 K/UL (ref 4.6–13.2)

## 2022-10-25 PROCEDURE — 74011000250 HC RX REV CODE- 250: Performed by: PHYSICIAN ASSISTANT

## 2022-10-25 PROCEDURE — 80053 COMPREHEN METABOLIC PANEL: CPT

## 2022-10-25 PROCEDURE — 51798 US URINE CAPACITY MEASURE: CPT

## 2022-10-25 PROCEDURE — 74011250636 HC RX REV CODE- 250/636: Performed by: INTERNAL MEDICINE

## 2022-10-25 PROCEDURE — 74011250637 HC RX REV CODE- 250/637: Performed by: PHYSICIAN ASSISTANT

## 2022-10-25 PROCEDURE — 86140 C-REACTIVE PROTEIN: CPT

## 2022-10-25 PROCEDURE — 82962 GLUCOSE BLOOD TEST: CPT

## 2022-10-25 PROCEDURE — 74011000258 HC RX REV CODE- 258: Performed by: INTERNAL MEDICINE

## 2022-10-25 PROCEDURE — 65270000046 HC RM TELEMETRY

## 2022-10-25 PROCEDURE — 80202 ASSAY OF VANCOMYCIN: CPT

## 2022-10-25 PROCEDURE — 99232 SBSQ HOSP IP/OBS MODERATE 35: CPT | Performed by: HOSPITALIST

## 2022-10-25 PROCEDURE — 2709999900 HC NON-CHARGEABLE SUPPLY

## 2022-10-25 PROCEDURE — 77030038269 HC DRN EXT URIN PURWCK BARD -A

## 2022-10-25 PROCEDURE — 74011250636 HC RX REV CODE- 250/636: Performed by: PHYSICIAN ASSISTANT

## 2022-10-25 PROCEDURE — 85025 COMPLETE CBC W/AUTO DIFF WBC: CPT

## 2022-10-25 PROCEDURE — 74011250637 HC RX REV CODE- 250/637: Performed by: HOSPITALIST

## 2022-10-25 PROCEDURE — 77030018842 HC SOL IRR SOD CL 9% BAXT -A

## 2022-10-25 PROCEDURE — 74011636637 HC RX REV CODE- 636/637: Performed by: HOSPITALIST

## 2022-10-25 PROCEDURE — 74011250636 HC RX REV CODE- 250/636: Performed by: STUDENT IN AN ORGANIZED HEALTH CARE EDUCATION/TRAINING PROGRAM

## 2022-10-25 PROCEDURE — 36415 COLL VENOUS BLD VENIPUNCTURE: CPT

## 2022-10-25 PROCEDURE — 77030019905 HC CATH URETH INTMIT MDII -A

## 2022-10-25 PROCEDURE — 74011000258 HC RX REV CODE- 258: Performed by: PHYSICIAN ASSISTANT

## 2022-10-25 PROCEDURE — 74011250636 HC RX REV CODE- 250/636: Performed by: HOSPITALIST

## 2022-10-25 RX ORDER — SODIUM CHLORIDE 9 MG/ML
50 INJECTION, SOLUTION INTRAVENOUS CONTINUOUS
Status: DISCONTINUED | OUTPATIENT
Start: 2022-10-25 | End: 2022-10-28 | Stop reason: HOSPADM

## 2022-10-25 RX ORDER — THERA TABS 400 MCG
1 TAB ORAL DAILY
Status: DISCONTINUED | OUTPATIENT
Start: 2022-10-25 | End: 2022-10-28 | Stop reason: HOSPADM

## 2022-10-25 RX ORDER — DIGOXIN 125 MCG
0.12 TABLET ORAL EVERY OTHER DAY
Status: DISCONTINUED | OUTPATIENT
Start: 2022-10-25 | End: 2022-10-28 | Stop reason: HOSPADM

## 2022-10-25 RX ADMIN — SODIUM CHLORIDE 50 ML/HR: 9 INJECTION, SOLUTION INTRAVENOUS at 13:49

## 2022-10-25 RX ADMIN — SODIUM CHLORIDE, PRESERVATIVE FREE 10 ML: 5 INJECTION INTRAVENOUS at 10:18

## 2022-10-25 RX ADMIN — VANCOMYCIN HYDROCHLORIDE 1000 MG: 1 INJECTION, POWDER, LYOPHILIZED, FOR SOLUTION INTRAVENOUS at 00:14

## 2022-10-25 RX ADMIN — THERA TABS 1 TABLET: TAB at 09:00

## 2022-10-25 RX ADMIN — ENOXAPARIN SODIUM 60 MG: 100 INJECTION SUBCUTANEOUS at 09:27

## 2022-10-25 RX ADMIN — REMDESIVIR 100 MG: 100 INJECTION, POWDER, LYOPHILIZED, FOR SOLUTION INTRAVENOUS at 00:02

## 2022-10-25 RX ADMIN — Medication 9 UNITS: at 17:31

## 2022-10-25 RX ADMIN — ATORVASTATIN CALCIUM 20 MG: 20 TABLET, FILM COATED ORAL at 21:47

## 2022-10-25 RX ADMIN — ENOXAPARIN SODIUM 60 MG: 100 INJECTION SUBCUTANEOUS at 21:48

## 2022-10-25 RX ADMIN — REMDESIVIR 100 MG: 100 INJECTION, POWDER, LYOPHILIZED, FOR SOLUTION INTRAVENOUS at 21:55

## 2022-10-25 RX ADMIN — DEXAMETHASONE SODIUM PHOSPHATE 6 MG: 4 INJECTION, SOLUTION INTRAMUSCULAR; INTRAVENOUS at 21:49

## 2022-10-25 RX ADMIN — DONEPEZIL HYDROCHLORIDE 5 MG: 5 TABLET, FILM COATED ORAL at 21:48

## 2022-10-25 RX ADMIN — FAMOTIDINE 20 MG: 10 INJECTION, SOLUTION INTRAVENOUS at 21:48

## 2022-10-25 RX ADMIN — SODIUM CHLORIDE, PRESERVATIVE FREE 10 ML: 5 INJECTION INTRAVENOUS at 13:51

## 2022-10-25 RX ADMIN — DIGOXIN 0.12 MG: 125 TABLET ORAL at 13:50

## 2022-10-25 RX ADMIN — Medication 3 UNITS: at 00:05

## 2022-10-25 RX ADMIN — Medication 3 UNITS: at 09:27

## 2022-10-25 RX ADMIN — Medication 3 UNITS: at 11:52

## 2022-10-25 RX ADMIN — SODIUM CHLORIDE, PRESERVATIVE FREE 10 ML: 5 INJECTION INTRAVENOUS at 21:59

## 2022-10-25 NOTE — PROGRESS NOTES
Infectious Disease progress Note        Reason:sepsis, covid-19 infection     Current abx Prior abx     Vancomycin since 10/24 Ceftriaxone, azithromycin on 10/23  Zosyn since 10/23-10/24     Lines:       Assessment :     80 y.o. female with a PMHx of dementia, hearing loss, angina who presented to the ED on 10/23/22 with c/o being found down. Now with fever, covid positive on 10/23, h/o exposure to someone with covid-19 on 10/18, cough/congestion since 10/22, gram positive bacteremia on 10/23/22    Clinical presentation consistent with sepsis-present on admission likely due to acute COVID-19 infection      1 of 2 sets of blood cultures 10/23 positive for staph species (not staph aureus by PCR)-likely  contaminant. No definitive skin/soft tissue infection as a source of bloodstream infection. Negative repeat blood culture 10/24/2022    12,000 colonies of gram-negative nu in urine culture-likely colonizer. No significant pyuria on urinalysis to suggest cystitis    Altered mentation-likely delirium, metabolic encephalopathy superimposed on underlying dementia    Paroxysmal a.fib with RVR: improved    Recommendations:    Discontinue vancomycin   Continue Decadron, discontinue remdesivir after p.m. dose  Wean oxygen as tolerated  Obtain post void bladder scan to rule out incomplete bladder emptying  Monitor CBC, temperature, procalcitonin, clinically    Above plan was discussed in details with RN, and dr Alissa Bah. Please call me if any further questions or concerns. Will continue to participate in the care of this patient. HPI:    Detail review of systems not feasible due to patient's mentation/hard of hearing              Past Medical History:   Diagnosis Date    Atypical angina ([address]     Bulging disc 9/21/2010    Cardiac catheterization 10/16/1990    Patent coronary arteries. Normal LV function. Cardiac echocardiogram 10/02/2012    EF 60%. No WMA. Mild-mod LAE.       Cardiac nuclear imaging test, low risk 10/02/2012    No ischemia or prior infarction. EF 77%. No WMA. Low risk pharm stress test.  Profound vagal response to Lexiscan. Carotid duplex 01/25/2006    No significant stenosis bilaterally. Dizziness     Dyspnea     Fecal incontinence     Follow-up exam 9/21/2010    Hematuria     Hepatitis B     Hip pain 4/13/2010    Hypertension     Hyperthyroidism 10/23/2022    Mixed stress and urge urinary incontinence     Overactive bladder     Spinal stenosis        Past Surgical History:   Procedure Laterality Date    CT ABD PELV W CONT      surgery for diverticulitis    HX APPENDECTOMY      HX BACK SURGERY      HX CHOLECYSTECTOMY      HX HEART CATHETERIZATION  10/16/90    HX ORTHOPAEDIC  2790-3217    5 back surgeries    HX ORTHOPAEDIC  3/06    left rotator cuff repair    MS BREAST SURGERY PROCEDURE UNLISTED      both breasts 3 times each       home Medication List      Details   colestipoL (COLESTID) 1 gram tablet       methIMAzole (TAPAZOLE) 5 mg tablet Take 5 mg by mouth daily. cholecalciferol (VITAMIN D3) 25 mcg (1,000 unit) cap Take 1,000 Units by mouth daily. atorvastatin (LIPITOR) 20 mg tablet Take 1 Tablet by mouth nightly. Qty: 30 Tablet, Refills: 0      calcium carbonate (TUMS) 200 mg calcium (500 mg) chew Take 1 Tablet by mouth three (3) times daily as needed for PRN Reason (Other) (GERD). Qty: 20 Tablet, Refills: 0      amLODIPine (NORVASC) 2.5 mg tablet Take 1 Tablet by mouth daily. Qty: 30 Tablet, Refills: 0      tolterodine ER (DETROL-LA) 2 mg ER capsule Take 2 mg by mouth daily. nitroglycerin (NITROSTAT) 0.4 mg SL tablet 1 Tab by SubLINGual route every five (5) minutes as needed. Qty: 25 Tab, Refills: 3      aspirin delayed-release 81 mg tablet Take 1 Tab by mouth daily. Qty: 30 Tab, Refills: 1      omeprazole (PRILOSEC) 20 mg capsule Take 20 mg by mouth daily. donepeziL (ARICEPT) 5 mg tablet Take 5 mg by mouth nightly.              Current Facility-Administered Medications   Medication Dose Route Frequency    enoxaparin (LOVENOX) injection 60 mg  1 mg/kg SubCUTAneous Q12H    [Held by provider] dilTIAZem (CARDIZEM) 100 mg in 0.9% sodium chloride (MBP/ADV) 100 mL infusion  5 mg/hr IntraVENous CONTINUOUS    insulin lispro (HUMALOG) injection   SubCUTAneous AC&HS    vancomycin (VANCOCIN) 1,000 mg in 0.9% sodium chloride 250 mL (VIAL-MATE)  1,000 mg IntraVENous Q24H    metoprolol (LOPRESSOR) injection 5 mg  5 mg IntraVENous Q20MIN PRN    sodium chloride (NS) flush 5-10 mL  5-10 mL IntraVENous PRN    [Held by provider] aspirin delayed-release tablet 81 mg  81 mg Oral DAILY    [Held by provider] atorvastatin (LIPITOR) tablet 20 mg  20 mg Oral QHS    [Held by provider] donepeziL (ARICEPT) tablet 5 mg  5 mg Oral QHS    [Held by provider] methIMAzole (TAPAZOLE) tablet 5 mg  5 mg Oral DAILY    sodium chloride (NS) flush 5-40 mL  5-40 mL IntraVENous Q8H    sodium chloride (NS) flush 5-40 mL  5-40 mL IntraVENous PRN    acetaminophen (TYLENOL) tablet 650 mg  650 mg Oral Q6H PRN    Or    acetaminophen (TYLENOL) suppository 650 mg  650 mg Rectal Q6H PRN    polyethylene glycol (MIRALAX) packet 17 g  17 g Oral DAILY PRN    ondansetron (ZOFRAN ODT) tablet 4 mg  4 mg Oral Q8H PRN    Or    ondansetron (ZOFRAN) injection 4 mg  4 mg IntraVENous Q6H PRN    dexamethasone (DECADRON) 4 mg/mL injection 6 mg  6 mg IntraVENous Q24H    dextrose 5% lactated ringers infusion  75 mL/hr IntraVENous CONTINUOUS    glucose chewable tablet 16 g  4 Tablet Oral PRN    glucagon (GLUCAGEN) injection 1 mg  1 mg IntraMUSCular PRN    dextrose 10% infusion 0-250 mL  0-250 mL IntraVENous PRN    remdesivir 100 mg in 0.9% sodium chloride 250 mL IVPB  100 mg IntraVENous Q24H    famotidine (PF) (PEPCID) 20 mg in 0.9% sodium chloride 10 mL injection  20 mg IntraVENous Q24H    ketorolac (TORADOL) injection 15 mg  15 mg IntraVENous Q6H PRN       Allergies: Iodinated contrast media    Family History   Problem Relation Age of Onset    Other Mother         hx of heart disorder    Hypertension Mother     Stroke Mother     Other Father         hx of heart disorder    Hypertension Father     Diabetes Father     Cancer Other      Social History     Socioeconomic History    Marital status:      Spouse name: Not on file    Number of children: Not on file    Years of education: Not on file    Highest education level: Not on file   Occupational History    Not on file   Tobacco Use    Smoking status: Former     Packs/day: 1.00     Years: 6.00     Pack years: 6.00     Types: Cigarettes     Quit date: 1971     Years since quittin.8    Smokeless tobacco: Never   Substance and Sexual Activity    Alcohol use: Not Currently     Alcohol/week: 2.5 standard drinks     Types: 3 Glasses of wine per week    Drug use: No    Sexual activity: Not on file   Other Topics Concern    Not on file   Social History Narrative    Not on file     Social Determinants of Health     Financial Resource Strain: Not on file   Food Insecurity: Not on file   Transportation Needs: Not on file   Physical Activity: Not on file   Stress: Not on file   Social Connections: Not on file   Intimate Partner Violence: Not on file   Housing Stability: Not on file     Social History     Tobacco Use   Smoking Status Former    Packs/day: 1.00    Years: 6.00    Pack years: 6.00    Types: Cigarettes    Quit date: 1971    Years since quittin.8   Smokeless Tobacco Never        Temp (24hrs), Av.4 °F (36.9 °C), Min:97.7 °F (36.5 °C), Max:100.4 °F (38 °C)    Visit Vitals  /61 (BP 1 Location: Right upper arm, BP Patient Position: At rest)   Pulse 98   Temp 97.7 °F (36.5 °C)   Resp 17   Ht 5' 7\" (1.702 m)   Wt 55 kg (121 lb 4.1 oz)   SpO2 99%   BMI 18.99 kg/m²       ROS: Unable to obtain due to patient factors    Physical Exam:    Vitals signs and nursing note reviewed.    Constitutional:       laying on bed, alert, hard of hearing, in no apparent distress, on RA  HENT:      Head: Normocephalic. Eyes:      Conjunctiva/sclera: Conjunctivae normal.      Neck:      Musculoskeletal: Normal range of motion and neck supple. Cardiovascular:      Rate and Rhythm: Normal rate and regular rhythm on monitor  Chest:      Bilateral chest movements equal.  Auscultation deferred due to Covid positive  Abdominal:      General: There is no distension. Palpations: Abdomen is soft. Tenderness: ? abdominal tenderness. There is no rebound. Musculoskeletal: Normal range of motion. General: No tenderness. Skin:     General: Skin is warm and dry. Findings: No rash. Neurological:      Mental Status: alert     No gross motor or sensory deficits noted  Psychiatric:         Confused, unable to perform detailed evaluation    Labs: Results:   Chemistry Recent Labs     10/25/22  0536 10/24/22  0357 10/23/22  1845   * 260* 184*    138 137   K 3.8 3.6 4.7    105 100   CO2 27 26 27   BUN 27* 26* 26*   CREA 0.49* 0.63 0.81   CA 8.0* 7.7* 8.4*   AGAP 4 7 10   BUCR 55* 41* 32*   AP 43*  --  55   TP 5.2*  --  6.5   ALB 2.2*  --  3.5   GLOB 3.0  --  3.0   AGRAT 0.7*  --  1.2        CBC w/Diff Recent Labs     10/25/22  0536 10/24/22  0357 10/23/22  1845   WBC 13.5* 13.8* 15.3*   RBC 3.16* 3.13* 3.46*   HGB 11.4* 11.3* 12.5   HCT 34.8* 34.4* 38.1    342 545*   GRANS 92* 89* 76*   LYMPH 4* 4* 8*   EOS 0 0 0        Microbiology Recent Labs     10/24/22  1856 10/23/22  1845 10/23/22  1715   CULT NO GROWTH AFTER 11 HOURS GRAM NEGATIVE RODS*  PROBABLE STAPHYLOCOCCUS SPECIES GROWING IN 1 OF 2 BOTTLES DRAWN No Site Indicated* NO GROWTH 2 DAYS            RADIOLOGY:    All available imaging studies/reports in [facility] for this admission were reviewed    Total time spent >35 minutes.  High complexity decision making was performed during the evaluation of this patient at high risk for decompensation with multiple organ involvement     Above mentioned total time spent on reviewing the case/medical record/data/notes/EMR/patient examination/documentation/coordinating care with nurse/consultants, exclusive of procedures with complex decision making performed and > 50% time spent in face to face evaluation. Disclaimer: Sections of this note are dictated utilizing voice recognition software, which may have resulted in some phonetic based errors in grammar and contents. Even though attempts were made to correct all the mistakes, some may have been missed, and remained in the body of the document. If questions arise, please contact our department.     Dr. Erin Mohan, Infectious Disease Specialist  [phone]  October 25, 2022  2:57 PM

## 2022-10-25 NOTE — PROGRESS NOTES
Comprehensive Nutrition Assessment    Type and Reason for Visit: Initial, Positive nutrition screen    Nutrition Recommendations/Plan:   Add oral nutrition supplement to optimize nutrition intake opportunity: Magic Cup (each provides 290 kcal, 9g protein) BID    Plan to add MVI daily   Continue current diet per SLP and as tolerated by patient  Monitor PO intake, compliance of oral supplement, weight, labs, and plan of care during admission. Malnutrition Assessment:  Malnutrition Status:  Insufficient data (10/25/22 0825)      Nutrition History and Allergies:   Past medical history: dementia, hearing loss, angina, dizziness, dyspnea, fecal incontinence, hematuria, hepatitis B, HTN, overactive bladder. NKFA. Weight history per chart review:  CBW: 10/23/22 : 55 kg (121 lb 4.1 oz), >180 days: 02/10/22 : 53.5 kg (118 lb), 1 year: 11/16/21 : 53.5 kg (118 lb). Weight stable x 1 year. Nutrition Assessment:    Pt presented to the ED with c/o being found down; dx with COVID-19; She is unable to provide further information due to confusion/dementia per MD note. Current lab shows elevated BUN (27) and low Creatinine (0.49), Calcium (8.0). Pt by SLP on 10/24 and transition to Dysphagia diet with honey thick consistency-will add oral supplements to reflect requirements. Pt receiving D5 at 75 mL/hr providing (90 gm dextrose, 306 kcal per day). Nutrition Related Findings:    Last BM PTA. Output: 0mL (urine-not documented). Pertinent Medications: lovenox, humalog, metaprolol, lipitor, miralax, zofran, D5 LF infusion at 75mL/hour, pepcid. Wound Type: None     Current Nutrition Intake & Therapies:  Average Meal Intake: Unable to assess  Average Supplement Intake: None ordered  ADULT DIET Dysphagia - Soft & Bite Sized;  Moderately Thick (Honey)    Anthropometric Measures:  Height: 5' 7\" (170.2 cm)  Ideal Body Weight (IBW): 135 lbs (61 kg)  Admission Body Weight: 121 lb 4.1 oz  Current Body Wt:  55 kg (121 lb 4.1 oz), 89.8 % IBW. Not specified  Current BMI (kg/m2): 19  Usual Body Weight: 53.5 kg (118 lb)  % Weight Change (Calculated): 2.8  BMI Category: Underweight (BMI less than 22) age over 72    Estimated Daily Nutrient Needs:  Energy Requirements Based On: Formula (MSJ x 1.2-1.4)  Weight Used for Energy Requirements: Current  Energy (kcal/day): 3059-8631  Weight Used for Protein Requirements: Current (0.8-1.2)  Protein (g/day): 44-66  Method Used for Fluid Requirements: 1 ml/kcal  Fluid (ml/day): 9247-3326    Nutrition Diagnosis:   Inadequate oral intake related to cognitive or neurological impairment, early satiety as evidenced by poor intake prior to admission    Nutrition Interventions:   Food and/or Nutrient Delivery: Continue current diet, Start oral nutrition supplement, Mineral supplement, Vitamin supplement, IV fluid delivery  Nutrition Education/Counseling: Education not indicated, No recommendations at this time  Coordination of Nutrition Care: Continue to monitor while inpatient       Goals:     Goals: Meet at least 75% of estimated needs, by next RD assessment       Nutrition Monitoring and Evaluation:   Behavioral-Environmental Outcomes: None identified  Food/Nutrient Intake Outcomes: Diet advancement/tolerance, Food and nutrient intake, Supplement intake, Vitamin/mineral intake, IVF intake  Physical Signs/Symptoms Outcomes: Biochemical data, GI status, Weight, Nutrition focused physical findings, Meal time behavior    Discharge Planning:    Continue current diet    Janusz Pedersen MA, RDN, LD   Contact: [phone]

## 2022-10-25 NOTE — ROUTINE PROCESS
Received bedside report from [facility], report included SBAR, MAR, and Kardex. 18 - Dr. Rock Cruz placed orders under nursing miscellaneous for patient's heart rate sustaining greater than 125 pbm. Gave bedside report to [employer], report included SBAR, MAR, and Kardex.

## 2022-10-25 NOTE — PROGRESS NOTES
[facility] Hospitalist Group  Progress Note    Patient: Jimbo Alvarez Age: 80 y.o. : 1930 MR#: [id number] SSN: [id number]  Date/Time: 10/25/2022     Subjective:     Pt seen & evaluated - lying in bed, NAD, is more alert today, near baseline. Patient is extremely hard of hearing. Assessment/Plan:     Severe Sepsis - likely 2y to COVID 19, continue IV Abx , ID on board   Acute Hypoxic Resp failure - on 2 lt NC currently , continue to monitor O2 levels   COVID infn - ID on board - continue Remdesvir and IV Decadron  Hyperthyroid - continue methimazole   PAF - HR improved, patient is not on Cardizem gtt. Can [place] to low blood pressure. Will add low-dose digoxin to see if it helps with rate control. Dysphagia -patient seen by SLP and started on dysphagia diet. Dementia - continue supportive care & donepezil. DVT Px - Lovenox   DNR      Discharge to skilled nursing facility in 1 to 2 days depending on hospital course. Yaniv Dobbs MD  10/25/22      Case discussed with:  []Patient  []Family  [x]Nursing  []Case Management  DVT Prophylaxis:  []Lovenox  []Hep SQ  []SCDs  []Coumadin   []On Heparin gtt    Objective:   VS: Visit Vitals  /65   Pulse (!) 109   Temp 97.8 °F (36.6 °C)   Resp 17   Ht 5' 7\" (1.702 m)   Wt 55 kg (121 lb 4.1 oz)   SpO2 99%   BMI 18.99 kg/m²        Tmax/24hrs: Temp (24hrs), Av.3 °F (36.8 °C), Min:97.7 °F (36.5 °C), Max:100.4 °F (38 °C)  IOBRIEF  Intake/Output Summary (Last 24 hours) at 10/25/2022 1247  Last data filed at 10/25/2022 0810  Gross per 24 hour   Intake 1520 ml   Output --   Net 1520 ml       General:  Lying in bed, NAD   Pulmonary: Decreased BS in bases   Cardiovascular: Tachycardic   GI:  Soft, Non distended, Non tender. + Bowel sounds. Extremities:  No edema, cyanosis, clubbing. No calf tenderness.    Neurologic: Alert and awake, lying in bed, able to move all 4 extremities, extremely hard of hearing  Additional:    Medications:   Current Facility-Administered Medications   Medication Dose Route Frequency    therapeutic multivitamin (THERAGRAN) tablet 1 Tablet  1 Tablet Oral DAILY    vancomycin (VANCOCIN) 750 mg in 0.9% sodium chloride 250 mL (VIAL-MATE)  750 mg IntraVENous Q12H    enoxaparin (LOVENOX) injection 60 mg  1 mg/kg SubCUTAneous Q12H    [Held by provider] dilTIAZem (CARDIZEM) 100 mg in 0.9% sodium chloride (MBP/ADV) 100 mL infusion  5 mg/hr IntraVENous CONTINUOUS    insulin lispro (HUMALOG) injection   SubCUTAneous AC&HS    metoprolol (LOPRESSOR) injection 5 mg  5 mg IntraVENous Q20MIN PRN    sodium chloride (NS) flush 5-10 mL  5-10 mL IntraVENous PRN    [Held by provider] aspirin delayed-release tablet 81 mg  81 mg Oral DAILY    [Held by provider] atorvastatin (LIPITOR) tablet 20 mg  20 mg Oral QHS    [Held by provider] donepeziL (ARICEPT) tablet 5 mg  5 mg Oral QHS    [Held by provider] methIMAzole (TAPAZOLE) tablet 5 mg  5 mg Oral DAILY    sodium chloride (NS) flush 5-40 mL  5-40 mL IntraVENous Q8H    sodium chloride (NS) flush 5-40 mL  5-40 mL IntraVENous PRN    acetaminophen (TYLENOL) tablet 650 mg  650 mg Oral Q6H PRN    Or    acetaminophen (TYLENOL) suppository 650 mg  650 mg Rectal Q6H PRN    polyethylene glycol (MIRALAX) packet 17 g  17 g Oral DAILY PRN    ondansetron (ZOFRAN ODT) tablet 4 mg  4 mg Oral Q8H PRN    Or    ondansetron (ZOFRAN) injection 4 mg  4 mg IntraVENous Q6H PRN    dexamethasone (DECADRON) 4 mg/mL injection 6 mg  6 mg IntraVENous Q24H    dextrose 5% lactated ringers infusion  75 mL/hr IntraVENous CONTINUOUS    glucose chewable tablet 16 g  4 Tablet Oral PRN    glucagon (GLUCAGEN) injection 1 mg  1 mg IntraMUSCular PRN    dextrose 10% infusion 0-250 mL  0-250 mL IntraVENous PRN    remdesivir 100 mg in 0.9% sodium chloride 250 mL IVPB  100 mg IntraVENous Q24H    famotidine (PF) (PEPCID) 20 mg in 0.9% sodium chloride 10 mL injection  20 mg IntraVENous Q24H    ketorolac (TORADOL) injection 15 mg  15 mg IntraVENous Q6H PRN       Imaging:   XR Results (most recent):  Results from Hospital Encounter encounter on 10/23/22    XR CHEST PORT    Narrative  EXAM: XR CHEST PORT    Indications: evaluate for covid pneumonia    Comparison: Most recently prior day    Findings: Moderately rotated to the left. Lines/Tubes/Devices:  None  LUNGS: There is some haziness at the right infrahilar area, that looks primarily  reticular as from vasculature. Hemidiaphragms remain evident. No pleural fluid. MEDIASTINUM: Senescent calcifications of the tracheobronchial tree. Normal heart  size. Mild atherosclerosis of the aorta. 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 Prior right  shoulder surgery marginally imaged. Dystrophic calcification right breast  tissues again noted. Impression  :    1. No convincing pneumonia. Favor vascular shadows right infrahilar region. CT Results (most recent):  Results from Hospital Encounter encounter on 10/23/22    CT SPINE CERV WO CONT    Narrative  CT CERVICAL SPINE    REASON FOR EXAM: fall  COMPARISON: Reference to CT soft tissue neck from 12/29/2015, CT cervical spine  12/29/2015. MRI cervical spine 8/8/2013    TECHNIQUE: 2.5 mm helically acquired images from the base of skull to the lung  apex. Sagittal and coronal reformations were obtained for better evaluation of  alignment, disk space height, interfacet relations, and vertebral integrity. All CT scans at this facility are performed using dose optimization technique as  appropriate to a performed exam, to include automated exposure control,  adjustment of the MA and/or kV according to patient size (including appropriate  matching for site-specific examinations) or use of  iterative reconstruction  technique. FINDINGS:    Alignment: Slightly accentuated cervical lordosis, similar findings on the  comparison exams.  Trace amount of anterolisthesis of C6 on C7 unchanged. Vertebral body heights: Normal.    Fracture: None. Degenerative changes: Significant multilevel degenerative changes, mostly  involving the facet joints. Autofusion of the right C2-C3 and right C4-C5 facet  joints. Hypertrophic changes in the left C3/C4, right C5/C6 facet joints. There  does not appear to be any critical central spinal canal narrowing. Soft tissues: The prevertebral and paraspinous soft tissues show no acute  findings. There are multiple small thyroid nodules, decreased in size from 2015  CT. Visualized lung apices: There are areas of pleural/parenchymal scarring in the  lung apices that appears stable from 2015 CT cervical spine. Impression  No findings of acute cervical spine fracture. Multilevel degenerative changes in the cervical spine with some associated mild  anterolisthesis of C6 on C7. Similar findings on the CT from 2015.      02/09/22    ECHO ADULT COMPLETE 02/10/2022 2/10/2022    Interpretation Summary    Left Ventricle: Left ventricle size is normal. Normal wall thickness. Normal wall motion. Normal left ventricular systolic function with a visually estimated EF of 55 - 60%. Normal diastolic function. Interatrial Septum: Agitated saline study was negative with and without provocation. Pulmonary Arteries: Pulmonary hypertension not present. The estimated pulmonary artery systolic pressure is 31 mmHg. Signed by: Terese Ashton MD on 2/10/2022 11:15 AM       MRI Results (most recent):  Results from [place] encounter on 02/09/22    MRI BRAIN WO CONT    Narrative  EXAM: MRI of the Head without contrast    INDICATION: rule out cva dysarthria. TECHNIQUE: MRI of the head without IV contrast. Multiplanar multisequence MR  images of the brain without contrast.    IV contrast:  None    COMPARISON: Head CT dated 2/9/2021    FINDINGS: No focus of restricted diffusion appreciated. The ventricles and sulci  are symmetric but moderately enlarged. Moderately extensive white matter  hyperintensities on FLAIR weighted imaging are noted in the periventricular and  subcortical white matter. No midline shift, mass effect, or mass lesion  appreciated. The grey-white junction is intact. No evidence for an acute  infarct identified. No focus of abnormal susceptibility appreciated. The  vascular flow voids are intact. The cerebellopontine angles are unremarkable. The visualized internal auditory canals and semicircular canals are  unremarkable. The pituitary is normal. The mastoid air cells are unremarkable. The visualized paranasal sinuses are unremarkable. The orbits are unremarkable. The scalp and skull are unremarkable. Impression  1. No acute intracranial process. 2.  Moderately extensive parenchymal volume loss and chronic small vessel  ischemic changes.         Labs:    Recent Results (from the past 48 hour(s))   CULTURE, BLOOD    Collection Time: 10/23/22  5:15 PM    Specimen: Blood   Result Value Ref Range    Special Requests: NO SPECIAL REQUESTS      Culture result: NO GROWTH 2 DAYS     EKG, 12 LEAD, INITIAL    Collection Time: 10/23/22  6:37 PM   Result Value Ref Range    Ventricular Rate 96 BPM    Atrial Rate 96 BPM    P-R Interval 144 ms    QRS Duration 60 ms    Q-T Interval 314 ms    QTC Calculation (Bezet) 396 ms    Calculated P Axis 83 degrees    Calculated R Axis 37 degrees    Calculated T Axis 81 degrees    Diagnosis       Normal sinus rhythm  Septal infarct (cited on or before 09-FEB-2022)  Abnormal ECG  When compared with ECG of 09-FEB-2022 12:49,  Sinus rhythm has replaced Atrial fibrillation  Questionable change in initial forces of Septal leads  Confirmed by Bianca Hunt MD, Evaristo Cameron ([id number]) on 10/24/2022 10:39:32 AM     CULTURE, BLOOD    Collection Time: 10/23/22  6:45 PM    Specimen: Blood   Result Value Ref Range    Special Requests: NO SPECIAL REQUESTS      GRAM STAIN AEROBIC BOTTLE GRAM POSITIVE COCCI IN GROUPS      GRAM STAIN SMEAR CALLED TO AND CORRECTLY REPEATED BY: NNEKA LOMBARDI,2S,AT 1344 TO 1200 [facility] ON 10/24/22    Culture result: (A)       PROBABLE STAPHYLOCOCCUS SPECIES GROWING IN 1 OF 2 BOTTLES DRAWN No Site Indicated   TROPONIN-HIGH SENSITIVITY    Collection Time: 10/23/22  6:45 PM   Result Value Ref Range    Troponin-High Sensitivity 28 0 - 54 ng/L   URINALYSIS W/ RFLX MICROSCOPIC    Collection Time: 10/23/22  6:45 PM   Result Value Ref Range    Color DARK YELLOW      Appearance CLEAR      Specific gravity 1.023 1.005 - 1.030      pH (UA) 5.0 5.0 - 8.0      Protein 100 (A) NEG mg/dL    Glucose Negative NEG mg/dL    Ketone 15 (A) NEG mg/dL    Bilirubin Negative NEG      Blood SMALL (A) NEG      Urobilinogen 1.0 0.2 - 1.0 EU/dL    Nitrites Negative NEG      Leukocyte Esterase TRACE (A) NEG     METABOLIC PANEL, COMPREHENSIVE    Collection Time: 10/23/22  6:45 PM   Result Value Ref Range    Sodium 137 136 - 145 mmol/L    Potassium 4.7 3.5 - 5.5 mmol/L    Chloride 100 100 - 111 mmol/L    CO2 27 21 - 32 mmol/L    Anion gap 10 3.0 - 18 mmol/L    Glucose 184 (H) 74 - 99 mg/dL    BUN 26 (H) 7.0 - 18 MG/DL    Creatinine 0.81 0.6 - 1.3 MG/DL    BUN/Creatinine ratio 32 (H) 12 - 20      eGFR >60 >60 ml/min/1.73m2    Calcium 8.4 (L) 8.5 - 10.1 MG/DL    Bilirubin, total 0.8 0.2 - 1.0 MG/DL    ALT (SGPT) 18 13 - 56 U/L    AST (SGOT) 34 10 - 38 U/L    Alk.  phosphatase 55 45 - 117 U/L    Protein, total 6.5 6.4 - 8.2 g/dL    Albumin 3.5 3.4 - 5.0 g/dL    Globulin 3.0 2.0 - 4.0 g/dL    A-G Ratio 1.2 0.8 - 1.7     CBC WITH AUTOMATED DIFF    Collection Time: 10/23/22  6:45 PM   Result Value Ref Range    WBC 15.3 (H) 4.6 - 13.2 K/uL    RBC 3.46 (L) 4.20 - 5.30 M/uL    HGB 12.5 12.0 - 16.0 g/dL    HCT 38.1 35.0 - 45.0 %    .1 (H) 78.0 - 100.0 FL    MCH 36.1 (H) 24.0 - 34.0 PG    MCHC 32.8 31.0 - 37.0 g/dL    RDW 16.7 (H) 11.6 - 14.5 %    PLATELET 019 (H) 587 - 420 K/uL    MPV 9.7 9.2 - 11.8 FL    NRBC 0.0 0  WBC    ABSOLUTE NRBC 0.00 0.00 - 0.01 K/uL    NEUTROPHILS 76 (H) 40 - 73 %    BAND NEUTROPHILS 14 (H) 0 - 5 %    LYMPHOCYTES 8 (L) 21 - 52 %    MONOCYTES 2 (L) 3 - 10 %    EOSINOPHILS 0 0 - 5 %    BASOPHILS 0 0 - 2 %    IMMATURE GRANULOCYTES 0 %    ABS. NEUTROPHILS 13.8 (H) 1.8 - 8.0 K/UL    ABS. LYMPHOCYTES 1.2 0.9 - 3.6 K/UL    ABS. MONOCYTES 0.3 0.05 - 1.2 K/UL    ABS. EOSINOPHILS 0.0 0.0 - 0.4 K/UL    ABS. BASOPHILS 0.0 0.0 - 0.1 K/UL    ABS. IMM.  GRANS. 0.0 K/UL    DF MANUAL      PLATELET COMMENTS Increased Platelets      RBC COMMENTS MACROCYTOSIS  1+        RBC COMMENTS OVALOCYTES  PRESENT       CK    Collection Time: 10/23/22  6:45 PM   Result Value Ref Range     (H) 26 - 192 U/L   URINE MICROSCOPIC ONLY    Collection Time: 10/23/22  6:45 PM   Result Value Ref Range    WBC 3 to 5 0 - 4 /hpf    RBC 0 to 3 0 - 5 /hpf    Epithelial cells FEW 0 - 5 /lpf    Bacteria 2+ (A) NEG /hpf    Granular cast 5 to 7 NEG /lpf   D DIMER    Collection Time: 10/23/22  6:45 PM   Result Value Ref Range    D DIMER 0.78 (H) <0.46 ug/ml(FEU)   CULTURE, URINE    Collection Time: 10/23/22  6:45 PM    Specimen: Clean catch; Urine   Result Value Ref Range    Special Requests: NO SPECIAL REQUESTS      Kirk Count [id number]  COLONIES/mL        Culture result: GRAM NEGATIVE RODS (A)     TSH 3RD GENERATION    Collection Time: 10/23/22  6:45 PM   Result Value Ref Range    TSH 0.63 0.36 - 3.74 uIU/mL   PROCALCITONIN    Collection Time: 10/23/22  6:45 PM   Result Value Ref Range    Procalcitonin 0.34 ng/mL   C REACTIVE PROTEIN, QT    Collection Time: 10/23/22  6:45 PM   Result Value Ref Range    C-Reactive protein 5.9 (H) 0 - 0.3 mg/dL   VITAMIN B12 & FOLATE    Collection Time: 10/23/22  6:45 PM   Result Value Ref Range    Vitamin B12 474 211 - 911 pg/mL    Folate 18.1 (H) 3.10 - 17.50 ng/mL   BLOOD CULTURE ID PANEL    Collection Time: 10/23/22  6:45 PM    Specimen: Blood   Result Value Ref Range    Acc. no. from Micro Order [id number]     Enterococcus faecalis Not detected NOTD Enterococcus faecium Not detected NOTD      Listeria monocytogenes Not detected NOTD      Staphylococcus Detected (A) NOTD      Staphylococcus aureus Not detected NOTD      Staph epidermidis Not detected NOTD      Staph lugdunensis Not detected NOTD      Streptococcus Not detected NOTD      Streptococcus agalactiae (Group B) Not detected NOTD      Streptococcus pneumoniae Not detected NOTD      Streptococcus pyogenes (Group A) Not detected NOTD      Acinetobacter calcoaceticus-baumanii complex Not detected NOTD      Bacteroides fragilis Not detected NOTD      Enterobacterales species Not detected NOTD      Enterobacter cloacae complex Not detected NOTD      Escherichia coli Not detected NOTD      Klebsiella aerogenes Not detected NOTD      Klebsiella oxytoca Not detected NOTD      Klebsiella pneumoniae group Not detected NOTD      Proteus Not detected NOTD      Salmonella Not detected NOTD      Serratia marcescens Not detected NOTD      Haemophilus influenzae Not detected NOTD      Neisseria meningitidis Not detected NOTD      Pseudomonas aeruginosa Not detected NOTD      Steno maltophilia Not detected NOTD      Candida albicans Not detected NOTD      Candida auris Not detected NOTD      Candida glabrata Not detected NOTD      Pratibha krusei Not detected NOTD      Candida parapsilosis Not detected NOTD      Candida tropicalis Not detected NOTD      Crypto neoformans/gattii Not detected NOTD      RESISTANT GENES:          Comment        False positive results may rarely occur.  Correlate with clinical,epidemiologic, and other laboratory findings   BLOOD GAS,LACTIC ACID, POC    Collection Time: 10/23/22  6:53 PM   Result Value Ref Range    pH (POC) 7.34 (L) 7.35 - 7.45      pCO2 (POC) 49.6 (H) 35.0 - 45.0 MMHG    pO2 (POC) 26 (LL) 80 - 100 MMHG    HCO3 (POC) 26.5 (H) 22 - 26 MMOL/L    sO2 (POC) 43.2 (L) 92 - 97 %    Base excess (POC) 0.0 mmol/L    Specimen type (POC) VENOUS BLOOD      Performed by Ivelisse Gurrola Lactic Acid (POC) 3.20 (HH) 0.40 - 2.00 mmol/L   COVID-19 RAPID TEST    Collection Time: 10/23/22  8:06 PM   Result Value Ref Range    Specimen source Nasopharyngeal      COVID-19 rapid test Detected (AA) NOTD     BLOOD GAS, ARTERIAL POC    Collection Time: 10/23/22  8:31 PM   Result Value Ref Range    Device: ROOM AIR      pH (POC) 7.44 7.35 - 7.45      pCO2 (POC) 36.1 35.0 - 45.0 MMHG    pO2 (POC) 70 (L) 80 - 100 MMHG    HCO3 (POC) 24.7 22 - 26 MMOL/L    sO2 (POC) 94.6 92 - 97 %    Base excess (POC) 0.8 mmol/L    Allens test (POC) NOT APPLICABLE      Site RIGHT BRACHIAL      Specimen type (POC) ARTERIAL      Performed by Naima Lai    POC LACTIC ACID    Collection Time: 10/23/22 10:42 PM   Result Value Ref Range    Lactic Acid (POC) 1.58 0.40 - 2.00 mmol/L   GLUCOSE, POC    Collection Time: 10/23/22 11:10 PM   Result Value Ref Range    Glucose (POC) 163 (H) 70 - 110 mg/dL   EKG, 12 LEAD, INITIAL    Collection Time: 10/24/22  2:00 AM   Result Value Ref Range    Ventricular Rate 118 BPM    Atrial Rate 108 BPM    QRS Duration 66 ms    Q-T Interval 316 ms    QTC Calculation (Bezet) 442 ms    Calculated R Axis -3 degrees    Calculated T Axis 59 degrees    Diagnosis       Atrial fibrillation with rapid ventricular response  Septal infarct (cited on or before 09-FEB-2022)  Abnormal ECG  Confirmed by William Castaneda MD, Blas Rizo ([id number]) on 10/24/2022 11:23:55 AM     VITAMIN D, 25 HYDROXY    Collection Time: 10/24/22  3:57 AM   Result Value Ref Range    Vitamin D 25-Hydroxy 26.6 (L) 30 - 100 ng/mL   CBC WITH AUTOMATED DIFF    Collection Time: 10/24/22  3:57 AM   Result Value Ref Range    WBC 13.8 (H) 4.6 - 13.2 K/uL    RBC 3.13 (L) 4.20 - 5.30 M/uL    HGB 11.3 (L) 12.0 - 16.0 g/dL    HCT 34.4 (L) 35.0 - 45.0 %    .9 (H) 78.0 - 100.0 FL    MCH 36.1 (H) 24.0 - 34.0 PG    MCHC 32.8 31.0 - 37.0 g/dL    RDW 17.1 (H) 11.6 - 14.5 %    PLATELET 212 559 - 249 K/uL    MPV 10.0 9.2 - 11.8 FL    NRBC 0.0 0  WBC    ABSOLUTE NRBC 0. 00 0.00 - 0.01 K/uL    NEUTROPHILS 89 (H) 40 - 73 %    BAND NEUTROPHILS 6 (H) 0 - 5 %    LYMPHOCYTES 4 (L) 21 - 52 %    MONOCYTES 1 (L) 3 - 10 %    EOSINOPHILS 0 0 - 5 %    BASOPHILS 0 0 - 2 %    IMMATURE GRANULOCYTES 0 %    ABS. NEUTROPHILS 13.1 (H) 1.8 - 8.0 K/UL    ABS. LYMPHOCYTES 0.6 (L) 0.9 - 3.6 K/UL    ABS. MONOCYTES 0.1 0.05 - 1.2 K/UL    ABS. EOSINOPHILS 0.0 0.0 - 0.4 K/UL    ABS. BASOPHILS 0.0 0.0 - 0.1 K/UL    ABS. IMM.  GRANS. 0.0 K/UL    DF MANUAL      PLATELET COMMENTS ADEQUATE PLATELETS      RBC COMMENTS OVALOCYTES  FEW       CK    Collection Time: 10/24/22  3:57 AM   Result Value Ref Range     (H) 26 - 192 U/L   C REACTIVE PROTEIN, QT    Collection Time: 10/24/22  3:57 AM   Result Value Ref Range    C-Reactive protein 8.4 (H) 0 - 0.3 mg/dL   HEMOGLOBIN A1C WITH EAG    Collection Time: 10/24/22  3:57 AM   Result Value Ref Range    Hemoglobin A1c 5.2 4.2 - 5.6 %    Est. average glucose 863 mg/dL   METABOLIC PANEL, BASIC    Collection Time: 10/24/22  3:57 AM   Result Value Ref Range    Sodium 138 136 - 145 mmol/L    Potassium 3.6 3.5 - 5.5 mmol/L    Chloride 105 100 - 111 mmol/L    CO2 26 21 - 32 mmol/L    Anion gap 7 3.0 - 18 mmol/L    Glucose 260 (H) 74 - 99 mg/dL    BUN 26 (H) 7.0 - 18 MG/DL    Creatinine 0.63 0.6 - 1.3 MG/DL    BUN/Creatinine ratio 41 (H) 12 - 20      eGFR >60 >60 ml/min/1.73m2    Calcium 7.7 (L) 8.5 - 10.1 MG/DL   GLUCOSE, POC    Collection Time: 10/24/22  5:16 AM   Result Value Ref Range    Glucose (POC) 192 (H) 70 - 110 mg/dL   VANCOMYCIN, RANDOM    Collection Time: 10/24/22  3:45 PM   Result Value Ref Range    Vancomycin, random 8.6 5.0 - 40.0 UG/ML   GLUCOSE, POC    Collection Time: 10/24/22  4:19 PM   Result Value Ref Range    Glucose (POC) 134 (H) 70 - 110 mg/dL   CULTURE, BLOOD    Collection Time: 10/24/22  6:56 PM    Specimen: Blood   Result Value Ref Range    Special Requests: NO SPECIAL REQUESTS  RIGHT  FOREARM        Culture result: NO GROWTH AFTER 11 HOURS GLUCOSE, POC    Collection Time: 10/24/22 11:45 PM   Result Value Ref Range    Glucose (POC) 153 (H) 70 - 110 mg/dL   CBC WITH AUTOMATED DIFF    Collection Time: 10/25/22  5:36 AM   Result Value Ref Range    WBC 13.5 (H) 4.6 - 13.2 K/uL    RBC 3.16 (L) 4.20 - 5.30 M/uL    HGB 11.4 (L) 12.0 - 16.0 g/dL    HCT 34.8 (L) 35.0 - 45.0 %    .1 (H) 78.0 - 100.0 FL    MCH 36.1 (H) 24.0 - 34.0 PG    MCHC 32.8 31.0 - 37.0 g/dL    RDW 17.3 (H) 11.6 - 14.5 %    PLATELET 721 916 - 078 K/uL    MPV 10.0 9.2 - 11.8 FL    NRBC 0.0 0  WBC    ABSOLUTE NRBC 0.00 0.00 - 0.01 K/uL    NEUTROPHILS 92 (H) 40 - 73 %    BAND NEUTROPHILS 3 0 - 5 %    LYMPHOCYTES 4 (L) 21 - 52 %    MONOCYTES 1 (L) 3 - 10 %    EOSINOPHILS 0 0 - 5 %    BASOPHILS 0 0 - 2 %    IMMATURE GRANULOCYTES 0 %    ABS. NEUTROPHILS 12.9 (H) 1.8 - 8.0 K/UL    ABS. LYMPHOCYTES 0.5 (L) 0.9 - 3.6 K/UL    ABS. MONOCYTES 0.1 0.05 - 1.2 K/UL    ABS. EOSINOPHILS 0.0 0.0 - 0.4 K/UL    ABS. BASOPHILS 0.0 0.0 - 0.1 K/UL    ABS. IMM. GRANS. 0.0 K/UL    DF MANUAL      PLATELET COMMENTS ADEQUATE PLATELETS      RBC COMMENTS MACROCYTOSIS  1+        RBC COMMENTS OVALOCYTES  SLIGHT       C REACTIVE PROTEIN, QT    Collection Time: 10/25/22  5:36 AM   Result Value Ref Range    C-Reactive protein 10.1 (H) 0 - 0.3 mg/dL   METABOLIC PANEL, COMPREHENSIVE    Collection Time: 10/25/22  5:36 AM   Result Value Ref Range    Sodium 141 136 - 145 mmol/L    Potassium 3.8 3.5 - 5.5 mmol/L    Chloride 110 100 - 111 mmol/L    CO2 27 21 - 32 mmol/L    Anion gap 4 3.0 - 18 mmol/L    Glucose 170 (H) 74 - 99 mg/dL    BUN 27 (H) 7.0 - 18 MG/DL    Creatinine 0.49 (L) 0.6 - 1.3 MG/DL    BUN/Creatinine ratio 55 (H) 12 - 20      eGFR >60 >60 ml/min/1.73m2    Calcium 8.0 (L) 8.5 - 10.1 MG/DL    Bilirubin, total 0.5 0.2 - 1.0 MG/DL    ALT (SGPT) 15 13 - 56 U/L    AST (SGOT) 29 10 - 38 U/L    Alk.  phosphatase 43 (L) 45 - 117 U/L    Protein, total 5.2 (L) 6.4 - 8.2 g/dL    Albumin 2.2 (L) 3.4 - 5.0 g/dL Globulin 3.0 2.0 - 4.0 g/dL    A-G Ratio 0.7 (L) 0.8 - 1.7     GLUCOSE, POC    Collection Time: 10/25/22  8:04 AM   Result Value Ref Range    Glucose (POC) 177 (H) 70 - 110 mg/dL   VANCOMYCIN, RANDOM    Collection Time: 10/25/22  9:18 AM   Result Value Ref Range    Vancomycin, random 11.9 5.0 - 40.0 UG/ML   GLUCOSE, POC    Collection Time: 10/25/22 11:30 AM   Result Value Ref Range    Glucose (POC) 196 (H) 70 - 110 mg/dL       Signed By: Misha Agarwal MD     October 25, 2022      I spent 25 minutes with the patient in face-to-face consultation, of which greater than 50% was spent in counseling and coordination of care as described above    Disclaimer: Sections of this note are dictated using utilizing voice recognition software. Minor typographical errors may be present. If questions arise, please do not hesitate to contact me or call our department.

## 2022-10-25 NOTE — PROGRESS NOTES
[address] [facility] Pharmacokinetic Monitoring Service - Vancomycin    Indication: bacteremia  Goal AUC/SALEEM: 400-600 mg*hr/L  Day of Therapy: 2  Additional Antimicrobials: none    Pertinent Laboratory Values: Wt Readings from Last 1 Encounters:   10/23/22 55 kg (121 lb 4.1 oz)     Temp Readings from Last 1 Encounters:   10/25/22 97.7 °F (36.5 °C)     No components found for: PROCAL  Estimated Creatinine Clearance: 44.5 mL/min (A) (by C-G formula based on SCr of 0.49 mg/dL (L)).   Recent Labs     10/25/22  0536 10/24/22  0357   WBC 13.5* 13.8*     Pertinent Cultures:  Culture Date Source Results   10/23 blood GPC in 1/2   10/24 blood NGTD   MRSA Nasal Swab: N/A    Assessment:  Date/Time Current Dose Concentration (mg/L) Timing of Concentration (h) AUC   10/24 1,250 mg x1 8.6 16 NA   10/25 1,000 mg q24h  750 mg q12h 11.9 9 334  489   Note: Serum concentrations collected for AUC dosing may appear elevated if collected in close proximity to the dose administered, this is not necessarily an indication of toxicity    Plan:  Increase dose from 1,000 mg q24h to 750 mg q12h  No level ordered at this time  Pharmacy will continue to monitor patient and adjust therapy as indicated    Thank you for the consult,  Lew Ackerman  10/25/2022

## 2022-10-25 NOTE — PROGRESS NOTES
Wound Prevention Checklist    Patient: Hermes Metzger (50 y.o. female)  Date: 10/25/2022  Diagnosis: COVID [U07.1]  SIRS (systemic inflammatory response syndrome) ([facility]) [R65.10]  Atrial fibrillation ([facility]) [I48.91] Severe sepsis ([address]    Precautions: Fall, Contact       [x]  Heel prevention boots placed on patient    [x]  Patient turned q2h during shift    [x]  Lift team ordered    [x]  Patient on [place] bed/Specialty bed    [x]  Each Wound is documented during shift (Stage, Color, drainage, odor, measurements, and dressings)    [x]  Dual skin check done with Kaila Gregorio RN

## 2022-10-25 NOTE — PROGRESS NOTES
Discharge/Transition Planning       Matched patient out for SNF. Will discuss choices with POA . Notes state it is step-daughter but would need this in medical directive . Has niece listed as well. Patient was unable to hear CM when tried to speak with her .        Patient with COVID and this may delay discharge for a few days

## 2022-10-26 LAB
ALBUMIN SERPL-MCNC: 2.2 G/DL (ref 3.4–5)
ALBUMIN/GLOB SERPL: 0.8 {RATIO} (ref 0.8–1.7)
ALP SERPL-CCNC: 39 U/L (ref 45–117)
ALT SERPL-CCNC: 14 U/L (ref 13–56)
ANION GAP SERPL CALC-SCNC: 5 MMOL/L (ref 3–18)
AST SERPL-CCNC: 20 U/L (ref 10–38)
BACTERIA SPEC CULT: ABNORMAL
BACTERIA SPEC CULT: ABNORMAL
BACTERIA SPEC CULT: NORMAL
BACTERIA SPEC CULT: NORMAL
BASOPHILS # BLD: 0 K/UL (ref 0–0.1)
BASOPHILS NFR BLD: 0 % (ref 0–2)
BILIRUB SERPL-MCNC: 0.3 MG/DL (ref 0.2–1)
BUN SERPL-MCNC: 36 MG/DL (ref 7–18)
BUN/CREAT SERPL: 69 (ref 12–20)
CALCIUM SERPL-MCNC: 7.7 MG/DL (ref 8.5–10.1)
CC UR VC: ABNORMAL
CHLORIDE SERPL-SCNC: 115 MMOL/L (ref 100–111)
CO2 SERPL-SCNC: 24 MMOL/L (ref 21–32)
CREAT SERPL-MCNC: 0.52 MG/DL (ref 0.6–1.3)
CRP SERPL-MCNC: 6.6 MG/DL (ref 0–0.3)
DIFFERENTIAL METHOD BLD: ABNORMAL
EOSINOPHIL # BLD: 0 K/UL (ref 0–0.4)
EOSINOPHIL NFR BLD: 0 % (ref 0–5)
ERYTHROCYTE [DISTWIDTH] IN BLOOD BY AUTOMATED COUNT: 17.5 % (ref 11.6–14.5)
GLOBULIN SER CALC-MCNC: 2.7 G/DL (ref 2–4)
GLUCOSE BLD STRIP.AUTO-MCNC: 119 MG/DL (ref 70–110)
GLUCOSE BLD STRIP.AUTO-MCNC: 137 MG/DL (ref 70–110)
GLUCOSE BLD STRIP.AUTO-MCNC: 195 MG/DL (ref 70–110)
GLUCOSE BLD STRIP.AUTO-MCNC: 274 MG/DL (ref 70–110)
GLUCOSE SERPL-MCNC: 212 MG/DL (ref 74–99)
GRAM STN SPEC: ABNORMAL
GRAM STN SPEC: ABNORMAL
HCT VFR BLD AUTO: 34.5 % (ref 35–45)
HGB BLD-MCNC: 11.2 G/DL (ref 12–16)
IMM GRANULOCYTES # BLD AUTO: 0.1 K/UL (ref 0–0.04)
IMM GRANULOCYTES NFR BLD AUTO: 1 % (ref 0–0.5)
LYMPHOCYTES # BLD: 1.1 K/UL (ref 0.9–3.6)
LYMPHOCYTES NFR BLD: 9 % (ref 21–52)
MCH RBC QN AUTO: 36.4 PG (ref 24–34)
MCHC RBC AUTO-ENTMCNC: 32.5 G/DL (ref 31–37)
MCV RBC AUTO: 112 FL (ref 78–100)
MONOCYTES # BLD: 0.1 K/UL (ref 0.05–1.2)
MONOCYTES NFR BLD: 1 % (ref 3–10)
NEUTS SEG # BLD: 11.8 K/UL (ref 1.8–8)
NEUTS SEG NFR BLD: 90 % (ref 40–73)
NRBC # BLD: 0 K/UL (ref 0–0.01)
NRBC BLD-RTO: 0 PER 100 WBC
PLATELET # BLD AUTO: 386 K/UL (ref 135–420)
PMV BLD AUTO: 9.8 FL (ref 9.2–11.8)
POTASSIUM SERPL-SCNC: 3.7 MMOL/L (ref 3.5–5.5)
PROCALCITONIN SERPL-MCNC: 0.56 NG/ML
PROT SERPL-MCNC: 4.9 G/DL (ref 6.4–8.2)
RBC # BLD AUTO: 3.08 M/UL (ref 4.2–5.3)
SERVICE CMNT-IMP: ABNORMAL
SERVICE CMNT-IMP: ABNORMAL
SERVICE CMNT-IMP: NORMAL
SODIUM SERPL-SCNC: 144 MMOL/L (ref 136–145)
WBC # BLD AUTO: 13.2 K/UL (ref 4.6–13.2)

## 2022-10-26 PROCEDURE — 74011250636 HC RX REV CODE- 250/636: Performed by: HOSPITALIST

## 2022-10-26 PROCEDURE — 65270000046 HC RM TELEMETRY

## 2022-10-26 PROCEDURE — 97530 THERAPEUTIC ACTIVITIES: CPT

## 2022-10-26 PROCEDURE — 74011000250 HC RX REV CODE- 250: Performed by: PHYSICIAN ASSISTANT

## 2022-10-26 PROCEDURE — 74011250636 HC RX REV CODE- 250/636: Performed by: PHYSICIAN ASSISTANT

## 2022-10-26 PROCEDURE — 74011636637 HC RX REV CODE- 636/637: Performed by: HOSPITALIST

## 2022-10-26 PROCEDURE — 94762 N-INVAS EAR/PLS OXIMTRY CONT: CPT

## 2022-10-26 PROCEDURE — 82962 GLUCOSE BLOOD TEST: CPT

## 2022-10-26 PROCEDURE — 2709999900 HC NON-CHARGEABLE SUPPLY

## 2022-10-26 PROCEDURE — 74011250636 HC RX REV CODE- 250/636: Performed by: INTERNAL MEDICINE

## 2022-10-26 PROCEDURE — 99232 SBSQ HOSP IP/OBS MODERATE 35: CPT | Performed by: HOSPITALIST

## 2022-10-26 PROCEDURE — 85025 COMPLETE CBC W/AUTO DIFF WBC: CPT

## 2022-10-26 PROCEDURE — 74011250637 HC RX REV CODE- 250/637: Performed by: PHYSICIAN ASSISTANT

## 2022-10-26 PROCEDURE — 80053 COMPREHEN METABOLIC PANEL: CPT

## 2022-10-26 PROCEDURE — 84145 PROCALCITONIN (PCT): CPT

## 2022-10-26 PROCEDURE — 74011250636 HC RX REV CODE- 250/636: Performed by: STUDENT IN AN ORGANIZED HEALTH CARE EDUCATION/TRAINING PROGRAM

## 2022-10-26 PROCEDURE — 36415 COLL VENOUS BLD VENIPUNCTURE: CPT

## 2022-10-26 PROCEDURE — 86140 C-REACTIVE PROTEIN: CPT

## 2022-10-26 PROCEDURE — 97535 SELF CARE MNGMENT TRAINING: CPT

## 2022-10-26 PROCEDURE — 77030018842 HC SOL IRR SOD CL 9% BAXT -A

## 2022-10-26 PROCEDURE — 74011250637 HC RX REV CODE- 250/637: Performed by: HOSPITALIST

## 2022-10-26 RX ORDER — DEXAMETHASONE 4 MG/1
6 TABLET ORAL DAILY
Status: DISCONTINUED | OUTPATIENT
Start: 2022-10-26 | End: 2022-10-28 | Stop reason: HOSPADM

## 2022-10-26 RX ORDER — INSULIN GLARGINE 100 [IU]/ML
5 INJECTION, SOLUTION SUBCUTANEOUS DAILY
Status: DISCONTINUED | OUTPATIENT
Start: 2022-10-26 | End: 2022-10-28 | Stop reason: HOSPADM

## 2022-10-26 RX ADMIN — SODIUM CHLORIDE, PRESERVATIVE FREE 10 ML: 5 INJECTION INTRAVENOUS at 18:46

## 2022-10-26 RX ADMIN — Medication 9 UNITS: at 11:48

## 2022-10-26 RX ADMIN — SODIUM CHLORIDE, PRESERVATIVE FREE 10 ML: 5 INJECTION INTRAVENOUS at 07:07

## 2022-10-26 RX ADMIN — ENOXAPARIN SODIUM 60 MG: 100 INJECTION SUBCUTANEOUS at 21:53

## 2022-10-26 RX ADMIN — ASPIRIN 81 MG: 81 TABLET, COATED ORAL at 11:01

## 2022-10-26 RX ADMIN — SODIUM CHLORIDE 50 ML/HR: 9 INJECTION, SOLUTION INTRAVENOUS at 11:10

## 2022-10-26 RX ADMIN — Medication 5 UNITS: at 11:48

## 2022-10-26 RX ADMIN — FAMOTIDINE 20 MG: 10 INJECTION, SOLUTION INTRAVENOUS at 21:55

## 2022-10-26 RX ADMIN — DEXAMETHASONE 6 MG: 4 TABLET ORAL at 21:54

## 2022-10-26 RX ADMIN — METHIMAZOLE 5 MG: 5 TABLET ORAL at 11:01

## 2022-10-26 RX ADMIN — ATORVASTATIN CALCIUM 20 MG: 20 TABLET, FILM COATED ORAL at 21:54

## 2022-10-26 RX ADMIN — THERA TABS 1 TABLET: TAB at 11:01

## 2022-10-26 RX ADMIN — SODIUM CHLORIDE, PRESERVATIVE FREE 10 ML: 5 INJECTION INTRAVENOUS at 22:25

## 2022-10-26 RX ADMIN — ENOXAPARIN SODIUM 60 MG: 100 INJECTION SUBCUTANEOUS at 11:01

## 2022-10-26 RX ADMIN — DONEPEZIL HYDROCHLORIDE 5 MG: 5 TABLET, FILM COATED ORAL at 21:54

## 2022-10-26 NOTE — DIABETES MGMT
Diabetes/ Glycemic Control Plan of Care      Recommendations:  1.) add 9 units basal lantus insulin daily starting today. Obtained order for 5 units from provider for patient who is 80 yr old. Assessment: Patient is 80 year with no documented history of diabetes mellitus was admitted on 10/23/2022 after she was found down by a neighbor. POC BG 10/25/2022: 196, 275  POC BG 10/26/2022: 195, 274  Total correctional lispro insulin dose of 18 units yesterday  Currently on dexamethasone every 24 hr    DX: No diagnosis found. Fasting/ Morning blood glucose:   Lab Results   Component Value Date/Time    Glucose 212 (H) 10/26/2022 06:17 AM    Glucose (POC) 274 (H) 10/26/2022 11:07 AM     IV Fluids containing dextrose: none    Steroids:   Rx Glucocorticoids (24h ago, onward)       Start     Dose Route Frequency Ordered Stop    10/24/22 2100  dexamethasone (DECADRON) 4 mg/mL injection 6 mg         6 mg IV EVERY 24 HOURS 10/23/22 2129 11/02/22 2059                     Blood glucose values: Within target range (70-180mg/dL):  NO    Current insulin orders:   Correctional lispro insulin. Very resistant dose    Total Daily Dose previous 24 hours: 18 units correctional lispro insulin    Current A1c:   Lab Results   Component Value Date/Time    Hemoglobin A1c 5.2 10/24/2022 03:57 AM      equivalent  to ave Blood Glucose of 103 mg/dl for 2-3 months prior to admission    Adequate glycemic control PTA: N/A. No     Nutrition/Diet:   Active Orders   Diet    ADULT DIET Dysphagia - Soft & Bite Sized; Moderately Thick (Honey)      Meal Intake:  Patient Vitals for the past 168 hrs:   % Diet Eaten   10/25/22 0810 1 - 25%     Supplement Intake:  No data found. Home diabetes medications: Did not see history of diabetes mellitus  Key Antihyperglycemic Medications       Patient is on no antihyperglycemic meds.             Plan/Goals:   Blood glucose will be within target of 70 - 180 mg/dl within 72 hours  Reinforce dietary and medication compliance at home.             Education:  [] Refer to Diabetes Education Record                       [x] Education not indicated at this time     Jesus Robin RN [facility]  Office: [phone]

## 2022-10-26 NOTE — PROGRESS NOTES
Reason for Admission:  COVID [U07.1]  SIRS (systemic inflammatory response syndrome) ([facility]) [R65.10]  Atrial fibrillation ([facility] [I48.91]                 RUR Score:    18            Plan for utilizing home health:    n/a                      Likelihood of Readmission:   Moderate                         Do you (patient/family) have any concerns for transition/discharge?  no    Transition of Care Plan:       Initial assessment completed with patient. Cognitive status of patient: oriented to time, place, person and situation. Face sheet information confirmed:  {yes no:38070}. The patient designates *** to participate in her discharge plan and to receive any needed information. This patient lives in a {dwelling type:02144} with {Relatives - adult:5061}. Patient {IS/IS NOT:87223} able to navigate steps as needed. Prior to hospitalization, patient was considered to be independent with ADLs/IADLS : {yes SQ:78287} . If not independent,  patient needs assist with : {SELF KZTZ:[id number]}    Patient has a current ACP document on file: {yes no:05101}      Healthcare Decision Maker:     Click here to complete [address] including selection of the Healthcare Decision Maker Relationship (ie \"Primary\")    The {Relatives - adult:5061} will be available to transport patient home upon discharge. The patient already has {OT Home Equipment:05623}, and {CPAP/BIPAP:20075} medical equipment available in the home. Patient {IS/IS NOT:13917} currently active with home health. If active, agency name is ***. Patient {has/has not:19482} stayed in a skilled nursing facility or rehab. Was  stay within last 60 days : {yes no:56252}.       This patient is on dialysis :{yes no:66936}    If yes, {DIALYSIS SZIC:92330} patient and receives treatment on {DIALYSIS DAYS:78768318} at {Blank Single Select Template:20061::\"home\",\"[facility]  [phone]\",\"[facility] [phone]\",\"[facility] [phone]\",\"[facility] [phone]\",\"[facility] -[phone]\",\"[address] [phone]\",\"[facility]. [phone]\"}  Chair time is ***. Pt is transported to/from dialysis by ***. List of available {Blank Single Select Template:20061::\"Home Health\",\"SNF\"} agencies were provided and reviewed with the patient prior to discharge. Freedom of choice signed: {yes no:72710}, for ***. Currently, the discharge plan is {DISCHARGE MANAGEMENT:77165}. The patient states that she can obtain her medications from the pharmacy, and take her medications as directed. Patient's current insurance is ***      Care Management Interventions  PCP Verified by CM:  Yes  Mode of Transport at Discharge: BLS  Transition of Care Consult (CM Consult): SNF, Discharge Planning  Support Systems: Child(cherise), Friend/Neighbor  Confirm Follow Up Transport: Family  The Plan for Transition of Care is Related to the Following Treatment Goals : snf  The Patient and/or Patient Representative was Provided with a Choice of Provider and Agrees with the Discharge Plan?: Yes  Freedom of Choice List was Provided with Basic Dialogue that Supports the Patient's Individualized Plan of Care/Goals, Treatment Preferences and Shares the Quality Data Associated with the Providers?: Yes  Discharge Location  Patient Expects to be Discharged to[de-identified] Skilled nursing facility        ***

## 2022-10-26 NOTE — PROGRESS NOTES
Infectious Disease progress Note        Reason:sepsis, covid-19 infection     Current abx Prior abx    Ceftriaxone, azithromycin on 10/23  Zosyn since 10/23-10/24  Vancomycin since 10/24-10/25     Lines:       Assessment :     80 y.o. female with a PMHx of dementia, hearing loss, angina who presented to the ED on 10/23/22 with c/o being found down. Now with fever, covid positive on 10/23, h/o exposure to someone with covid-19 on 10/18, cough/congestion since 10/22, gram positive bacteremia on 10/23/22    Clinical presentation consistent with sepsis-present on admission likely due to acute COVID-19 infection  S/p remdesivir 10/23-10/25    1 of 2 sets of blood cultures 10/23 positive for staph species (not staph aureus by PCR)-likely  contaminant. No definitive skin/soft tissue infection as a source of bloodstream infection. Negative repeat blood culture 10/24/2022    12,000 colonies of gram-negative nu in urine culture-likely colonizer. No significant pyuria on urinalysis to suggest cystitis    Altered mentation-likely delirium, metabolic encephalopathy superimposed on underlying dementia    Paroxysmal a.fib with RVR: improved    Incomplete bladder emptying-300 cc postvoid residual noted on bladder scan 10/25/2022    Recommendations:    Hold further abx  Continue Decadron. Switch to po till 10/29/22  Wean oxygen as tolerated  Management of urinary retention per primary team  Discharge planning per primary team    Above plan was discussed in details with RN, and dr Iza Prieto. Please call me if any further questions or concerns. Will continue to participate in the care of this patient. HPI:    Detailed  review of systems not feasible due to patient's mentation/hard of hearing. More communicative today. States that she feels better.  Asked for water              Past Medical History:   Diagnosis Date    Atypical angina ([facility] [address]     Bulging disc 9/21/2010    Cardiac catheterization 10/16/1990    Patent coronary arteries. Normal LV function. Cardiac echocardiogram 10/02/2012    EF 60%. No WMA. Mild-mod LAE. Cardiac nuclear imaging test, low risk 10/02/2012    No ischemia or prior infarction. EF 77%. No WMA. Low risk pharm stress test.  Profound vagal response to Lexiscan. Carotid duplex 01/25/2006    No significant stenosis bilaterally. Dizziness     Dyspnea     Fecal incontinence     Follow-up exam 9/21/2010    Hematuria     Hepatitis B     Hip pain 4/13/2010    Hypertension     Hyperthyroidism 10/23/2022    Mixed stress and urge urinary incontinence     Overactive bladder     Spinal stenosis        Past Surgical History:   Procedure Laterality Date    CT ABD PELV W CONT      surgery for diverticulitis    HX APPENDECTOMY      HX BACK SURGERY      HX CHOLECYSTECTOMY      HX HEART CATHETERIZATION  10/16/90    HX ORTHOPAEDIC  [id number]    5 back surgeries    HX ORTHOPAEDIC  3/06    left rotator cuff repair    [place] BREAST SURGERY PROCEDURE UNLISTED      both breasts 3 times each       home Medication List      Details   colestipoL (COLESTID) 1 gram tablet       methIMAzole (TAPAZOLE) 5 mg tablet Take 5 mg by mouth daily. cholecalciferol (VITAMIN D3) 25 mcg (1,000 unit) cap Take 1,000 Units by mouth daily. atorvastatin (LIPITOR) 20 mg tablet Take 1 Tablet by mouth nightly. Qty: 30 Tablet, Refills: 0      calcium carbonate (TUMS) 200 mg calcium (500 mg) chew Take 1 Tablet by mouth three (3) times daily as needed for PRN Reason (Other) (GERD). Qty: 20 Tablet, Refills: 0      amLODIPine (NORVASC) 2.5 mg tablet Take 1 Tablet by mouth daily. Qty: 30 Tablet, Refills: 0      tolterodine ER (DETROL-LA) 2 mg ER capsule Take 2 mg by mouth daily. nitroglycerin (NITROSTAT) 0.4 mg SL tablet 1 Tab by SubLINGual route every five (5) minutes as needed. Qty: 25 Tab, Refills: 3      aspirin delayed-release 81 mg tablet Take 1 Tab by mouth daily.   Qty: 30 Tab, Refills: 1      omeprazole (PRILOSEC) 20 mg capsule Take 20 mg by mouth daily. donepeziL (ARICEPT) 5 mg tablet Take 5 mg by mouth nightly.              Current Facility-Administered Medications   Medication Dose Route Frequency    therapeutic multivitamin (THERAGRAN) tablet 1 Tablet  1 Tablet Oral DAILY    digoxin (LANOXIN) tablet 0.125 mg  0.125 mg Oral EVERY OTHER DAY    0.9% sodium chloride infusion  50 mL/hr IntraVENous CONTINUOUS    enoxaparin (LOVENOX) injection 60 mg  1 mg/kg SubCUTAneous Q12H    insulin lispro (HUMALOG) injection   SubCUTAneous AC&HS    metoprolol (LOPRESSOR) injection 5 mg  5 mg IntraVENous Q20MIN PRN    sodium chloride (NS) flush 5-10 mL  5-10 mL IntraVENous PRN    aspirin delayed-release tablet 81 mg  81 mg Oral DAILY    atorvastatin (LIPITOR) tablet 20 mg  20 mg Oral QHS    donepeziL (ARICEPT) tablet 5 mg  5 mg Oral QHS    methIMAzole (TAPAZOLE) tablet 5 mg  5 mg Oral DAILY    sodium chloride (NS) flush 5-40 mL  5-40 mL IntraVENous Q8H    sodium chloride (NS) flush 5-40 mL  5-40 mL IntraVENous PRN    acetaminophen (TYLENOL) tablet 650 mg  650 mg Oral Q6H PRN    Or    acetaminophen (TYLENOL) suppository 650 mg  650 mg Rectal Q6H PRN    polyethylene glycol (MIRALAX) packet 17 g  17 g Oral DAILY PRN    ondansetron (ZOFRAN ODT) tablet 4 mg  4 mg Oral Q8H PRN    Or    ondansetron (ZOFRAN) injection 4 mg  4 mg IntraVENous Q6H PRN    dexamethasone (DECADRON) 4 mg/mL injection 6 mg  6 mg IntraVENous Q24H    glucose chewable tablet 16 g  4 Tablet Oral PRN    glucagon (GLUCAGEN) injection 1 mg  1 mg IntraMUSCular PRN    dextrose 10% infusion 0-250 mL  0-250 mL IntraVENous PRN    famotidine (PF) (PEPCID) 20 mg in 0.9% sodium chloride 10 mL injection  20 mg IntraVENous Q24H       Allergies: Iodinated contrast media    Family History   Problem Relation Age of Onset    Other Mother         hx of heart disorder    Hypertension Mother     Stroke Mother     Other Father         hx of heart disorder    Hypertension Father     Diabetes Father Cancer Other      Social History     Socioeconomic History    Marital status:      Spouse name: Not on file    Number of children: Not on file    Years of education: Not on file    Highest education level: Not on file   Occupational History    Not on file   Tobacco Use    Smoking status: Former     Packs/day: 1.00     Years: 6.00     Pack years: 6.00     Types: Cigarettes     Quit date: 1971     Years since quittin.9    Smokeless tobacco: Never   Substance and Sexual Activity    Alcohol use: Not Currently     Alcohol/week: 2.5 standard drinks     Types: 3 Glasses of wine per week    Drug use: No    Sexual activity: Not on file   Other Topics Concern    Not on file   Social History Narrative    Not on file     Social Determinants of Health     Financial Resource Strain: Not on file   Food Insecurity: Not on file   Transportation Needs: Not on file   Physical Activity: Not on file   Stress: Not on file   Social Connections: Not on file   Intimate Partner Violence: Not on file   Housing Stability: Not on file     Social History     Tobacco Use   Smoking Status Former    Packs/day: 1.00    Years: 6.00    Pack years: 6.00    Types: Cigarettes    Quit date: 1971    Years since quittin.9   Smokeless Tobacco Never        Temp (24hrs), Av °F (36.7 °C), Min:97.6 °F (36.4 °C), Max:98.7 °F (37.1 °C)    Visit Vitals  /63   Pulse 88   Temp 97.6 °F (36.4 °C)   Resp 17   Ht 5' 7\" (1.702 m)   Wt 55 kg (121 lb 4.1 oz)   SpO2 97%   BMI 18.99 kg/m²       ROS: Unable to obtain due to patient factors    Physical Exam:    Vitals signs and nursing note reviewed. Constitutional:       laying on bed, alert, hard of hearing, in no apparent distress, on RA  HENT:      Head: Normocephalic. Eyes:      Conjunctiva/sclera: Conjunctivae normal.      Neck:      Musculoskeletal: Normal range of motion and neck supple.    Cardiovascular:      Rate and Rhythm: Normal rate and regular rhythm on monitor  Chest:      Bilateral chest movements equal.  Auscultation deferred due to Covid positive  Abdominal:      General: There is no distension. Palpations: Abdomen is soft. Tenderness: ? abdominal tenderness. There is no rebound. Musculoskeletal: Normal range of motion. General: No tenderness. Skin:     General: Skin is warm and dry. Findings: No rash. Neurological:      Mental Status: alert     No gross motor or sensory deficits noted  Psychiatric:         Confused, unable to perform detailed evaluation    Labs: Results:   Chemistry Recent Labs     10/26/22  0617 10/25/22  0536 10/24/22  0357 10/23/22  1845   * 170* 260* 184*    141 138 137   K 3.7 3.8 3.6 4.7   * 110 105 100   CO2 24 27 26 27   BUN 36* 27* 26* 26*   CREA 0.52* 0.49* 0.63 0.81   CA 7.7* 8.0* 7.7* 8.4*   AGAP 5 4 7 10   BUCR 69* 55* 41* 32*   AP 39* 43*  --  55   TP 4.9* 5.2*  --  6.5   ALB 2.2* 2.2*  --  3.5   GLOB 2.7 3.0  --  3.0   AGRAT 0.8 0.7*  --  1.2        CBC w/Diff Recent Labs     10/26/22  0617 10/25/22  0536 10/24/22  0357   WBC 13.2 13.5* 13.8*   RBC 3.08* 3.16* 3.13*   HGB 11.2* 11.4* 11.3*   HCT 34.5* 34.8* 34.4*    355 342   GRANS 90* 92* 89*   LYMPH 9* 4* 4*   EOS 0 0 0        Microbiology Recent Labs     10/24/22  1856 10/23/22  1845 10/23/22  1715   CULT NO GROWTH 2 DAYS PROBABLE STAPHYLOCOCCUS SPECIES, COAGULASE NEGATIVE GROWING IN 1 OF 2 BOTTLES DRAWN No Site Indicated*  GRAM NEGATIVE RODS* NO GROWTH 3 DAYS            RADIOLOGY:    All available imaging studies/reports in [facility] care for this admission were reviewed      Disclaimer: Sections of this note are dictated utilizing voice recognition software, which may have resulted in some phonetic based errors in grammar and contents. Even though attempts were made to correct all the mistakes, some may have been missed, and remained in the body of the document. If questions arise, please contact our department.      Farhat Bowen, Infectious Disease Specialist  [phone]  October 26, 2022  2:57 PM

## 2022-10-26 NOTE — PROGRESS NOTES
[facility]  Progress Note    Patient: Donna Cooney Age: 80 y.o. : 1930 MR#: [id number] SSN: [id number]  Date/Time: 10/26/2022     Subjective:     Pt seen & evaluated - lying in bed, NAD, is more alert today, near baseline. Patient is extremely hard of hearing. Nurse mentions that patient is having diarrhea, will start Questran. Assessment/Plan:     Severe Sepsis - likely 2y to COVID 19, continue IV Abx , ID on board   Acute Hypoxic Resp failure - on 2 lt NC currently , continue to monitor O2 levels   COVID infn - ID on board -completed remdesivir, will switch to p.o. steroids. Hyperthyroid - continue methimazole   PAF - HR improved, patient is not on Cardizem gtt. Will add low-dose digoxin to see if it helps with rate control. Heart rate better controlled after adding digoxin. Dysphagia -patient seen by SLP and started on dysphagia diet. Dementia - continue supportive care & donepezil. DVT Px - Lovenox   DNR      Discharge to skilled nursing facility in 1 to 2 days depending on hospital course. Case management on board for discharge planning. Updated Jamari Brown.         Irma Horan MD  10/26/22      Case discussed with:  []Patient  []Family  [x]Nursing  []Case Management  DVT Prophylaxis:  []Lovenox  []Hep SQ  []SCDs  []Coumadin   []On Heparin gtt    Objective:   VS: Visit Vitals  /62   Pulse 78   Temp 97.9 °F (36.6 °C)   Resp 16   Ht 5' 7\" (1.702 m)   Wt 55 kg (121 lb 4.1 oz)   SpO2 98%   BMI 18.99 kg/m²        Tmax/24hrs: Temp (24hrs), Av °F (36.7 °C), Min:97.6 °F (36.4 °C), Max:98.7 °F (37.1 °C)  IOBRIEF  Intake/Output Summary (Last 24 hours) at 10/26/2022 1452  Last data filed at 10/26/2022 1357  Gross per 24 hour   Intake 1509.17 ml   Output 300 ml   Net 1209.17 ml         General:  Lying in bed, NAD   Pulmonary: Decreased BS in bases   Cardiovascular: Tachycardic   GI:  Soft, Non distended, Non tender. + Bowel sounds. Extremities:  No edema, cyanosis, clubbing. No calf tenderness.    Neurologic: Alert and awake, lying in bed, able to move all 4 extremities, extremely hard of hearing  Additional:    Medications:   Current Facility-Administered Medications   Medication Dose Route Frequency    insulin glargine (LANTUS) injection 5 Units  5 Units SubCUTAneous DAILY    therapeutic multivitamin (THERAGRAN) tablet 1 Tablet  1 Tablet Oral DAILY    digoxin (LANOXIN) tablet 0.125 mg  0.125 mg Oral EVERY OTHER DAY    0.9% sodium chloride infusion  50 mL/hr IntraVENous CONTINUOUS    enoxaparin (LOVENOX) injection 60 mg  1 mg/kg SubCUTAneous Q12H    insulin lispro (HUMALOG) injection   SubCUTAneous AC&HS    metoprolol (LOPRESSOR) injection 5 mg  5 mg IntraVENous Q20MIN PRN    sodium chloride (NS) flush 5-10 mL  5-10 mL IntraVENous PRN    aspirin delayed-release tablet 81 mg  81 mg Oral DAILY    atorvastatin (LIPITOR) tablet 20 mg  20 mg Oral QHS    donepeziL (ARICEPT) tablet 5 mg  5 mg Oral QHS    methIMAzole (TAPAZOLE) tablet 5 mg  5 mg Oral DAILY    sodium chloride (NS) flush 5-40 mL  5-40 mL IntraVENous Q8H    sodium chloride (NS) flush 5-40 mL  5-40 mL IntraVENous PRN    acetaminophen (TYLENOL) tablet 650 mg  650 mg Oral Q6H PRN    Or    acetaminophen (TYLENOL) suppository 650 mg  650 mg Rectal Q6H PRN    polyethylene glycol (MIRALAX) packet 17 g  17 g Oral DAILY PRN    ondansetron (ZOFRAN ODT) tablet 4 mg  4 mg Oral Q8H PRN    Or    ondansetron (ZOFRAN) injection 4 mg  4 mg IntraVENous Q6H PRN    dexamethasone (DECADRON) 4 mg/mL injection 6 mg  6 mg IntraVENous Q24H    glucose chewable tablet 16 g  4 Tablet Oral PRN    glucagon (GLUCAGEN) injection 1 mg  1 mg IntraMUSCular PRN    dextrose 10% infusion 0-250 mL  0-250 mL IntraVENous PRN    famotidine (PF) (PEPCID) 20 mg in 0.9% sodium chloride 10 mL injection  20 mg IntraVENous Q24H       Imaging:   XR Results (most recent):  Results from Hospital Encounter encounter on 10/23/22    XR CHEST PORT    Narrative  EXAM: XR CHEST PORT    Indications: evaluate for covid pneumonia    Comparison: Most recently prior day    Findings: Moderately rotated to the left. Lines/Tubes/Devices:  None  LUNGS: There is some haziness at the right infrahilar area, that looks primarily  reticular as from vasculature. Hemidiaphragms remain evident. No pleural fluid. MEDIASTINUM: Senescent calcifications of the tracheobronchial tree. Normal heart  size. Mild atherosclerosis of the aorta. 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 Prior right  shoulder surgery marginally imaged. Dystrophic calcification right breast  tissues again noted. Impression  :    1. No convincing pneumonia. Favor vascular shadows right infrahilar region. CT Results (most recent):  Results from Hospital Encounter encounter on 10/23/22    CT SPINE CERV WO CONT    Narrative  CT CERVICAL SPINE    REASON FOR EXAM: fall  COMPARISON: Reference to CT soft tissue neck from 12/29/2015, CT cervical spine  12/29/2015. MRI cervical spine 8/8/2013    TECHNIQUE: 2.5 mm helically acquired images from the base of skull to the lung  apex. Sagittal and coronal reformations were obtained for better evaluation of  alignment, disk space height, interfacet relations, and vertebral integrity. All CT scans at this facility are performed using dose optimization technique as  appropriate to a performed exam, to include automated exposure control,  adjustment of the MA and/or kV according to patient size (including appropriate  matching for site-specific examinations) or use of  iterative reconstruction  technique. FINDINGS:    Alignment: Slightly accentuated cervical lordosis, similar findings on the  comparison exams. Trace amount of anterolisthesis of C6 on C7 unchanged. Vertebral body heights: Normal.    Fracture: None.     Degenerative changes: Significant multilevel degenerative changes, mostly  involving the facet joints. Autofusion of the right C2-C3 and right C4-C5 facet  joints. Hypertrophic changes in the left C3/C4, right C5/C6 facet joints. There  does not appear to be any critical central spinal canal narrowing. Soft tissues: The prevertebral and paraspinous soft tissues show no acute  findings. There are multiple small thyroid nodules, decreased in size from 2015  CT. Visualized lung apices: There are areas of pleural/parenchymal scarring in the  lung apices that appears stable from 2015 CT cervical spine. Impression  No findings of acute cervical spine fracture. Multilevel degenerative changes in the cervical spine with some associated mild  anterolisthesis of C6 on C7. Similar findings on the CT from 2015.      02/09/22    ECHO ADULT COMPLETE 02/10/2022 2/10/2022    Interpretation Summary    Left Ventricle: Left ventricle size is normal. Normal wall thickness. Normal wall motion. Normal left ventricular systolic function with a visually estimated EF of 55 - 60%. Normal diastolic function. Interatrial Septum: Agitated saline study was negative with and without provocation. Pulmonary Arteries: Pulmonary hypertension not present. The estimated pulmonary artery systolic pressure is 31 mmHg. Signed by: Roberta Miller MD on 2/10/2022 11:15 AM       MRI Results (most recent):  Results from [place] encounter on 02/09/22    MRI BRAIN WO CONT    Narrative  EXAM: MRI of the Head without contrast    INDICATION: rule out cva dysarthria. TECHNIQUE: MRI of the head without IV contrast. Multiplanar multisequence MR  images of the brain without contrast.    IV contrast:  None    COMPARISON: Head CT dated 2/9/2021    FINDINGS: No focus of restricted diffusion appreciated. The ventricles and sulci  are symmetric but moderately enlarged. Moderately extensive white matter  hyperintensities on FLAIR weighted imaging are noted in the periventricular and  subcortical white matter.  No midline shift, mass effect, or mass lesion  appreciated. The grey-white junction is intact. No evidence for an acute  infarct identified. No focus of abnormal susceptibility appreciated. The  vascular flow voids are intact. The cerebellopontine angles are unremarkable. The visualized internal auditory canals and semicircular canals are  unremarkable. The pituitary is normal. The mastoid air cells are unremarkable. The visualized paranasal sinuses are unremarkable. The orbits are unremarkable. The scalp and skull are unremarkable. Impression  1. No acute intracranial process. 2.  Moderately extensive parenchymal volume loss and chronic small vessel  ischemic changes.         Labs:    Recent Results (from the past 48 hour(s))   VANCOMYCIN, RANDOM    Collection Time: 10/24/22  3:45 PM   Result Value Ref Range    Vancomycin, random 8.6 5.0 - 40.0 UG/ML   GLUCOSE, POC    Collection Time: 10/24/22  4:19 PM   Result Value Ref Range    Glucose (POC) 134 (H) 70 - 110 mg/dL   CULTURE, BLOOD    Collection Time: 10/24/22  6:56 PM    Specimen: Blood   Result Value Ref Range    Special Requests: NO SPECIAL REQUESTS  RIGHT  FOREARM        Culture result: NO GROWTH 2 DAYS     GLUCOSE, POC    Collection Time: 10/24/22 11:45 PM   Result Value Ref Range    Glucose (POC) 153 (H) 70 - 110 mg/dL   CBC WITH AUTOMATED DIFF    Collection Time: 10/25/22  5:36 AM   Result Value Ref Range    WBC 13.5 (H) 4.6 - 13.2 K/uL    RBC 3.16 (L) 4.20 - 5.30 M/uL    HGB 11.4 (L) 12.0 - 16.0 g/dL    HCT 34.8 (L) 35.0 - 45.0 %    .1 (H) 78.0 - 100.0 FL    MCH 36.1 (H) 24.0 - 34.0 PG    MCHC 32.8 31.0 - 37.0 g/dL    RDW 17.3 (H) 11.6 - 14.5 %    PLATELET 301 433 - 944 K/uL    MPV 10.0 9.2 - 11.8 FL    NRBC 0.0 0  WBC    ABSOLUTE NRBC 0.00 0.00 - 0.01 K/uL    NEUTROPHILS 92 (H) 40 - 73 %    BAND NEUTROPHILS 3 0 - 5 %    LYMPHOCYTES 4 (L) 21 - 52 %    MONOCYTES 1 (L) 3 - 10 %    EOSINOPHILS 0 0 - 5 %    BASOPHILS 0 0 - 2 %    IMMATURE GRANULOCYTES 0 %    ABS. NEUTROPHILS 12.9 (H) 1.8 - 8.0 K/UL    ABS. LYMPHOCYTES 0.5 (L) 0.9 - 3.6 K/UL    ABS. MONOCYTES 0.1 0.05 - 1.2 K/UL    ABS. EOSINOPHILS 0.0 0.0 - 0.4 K/UL    ABS. BASOPHILS 0.0 0.0 - 0.1 K/UL    ABS. IMM. GRANS. 0.0 K/UL    DF MANUAL      PLATELET COMMENTS ADEQUATE PLATELETS      RBC COMMENTS MACROCYTOSIS  1+        RBC COMMENTS OVALOCYTES  SLIGHT       C REACTIVE PROTEIN, QT    Collection Time: 10/25/22  5:36 AM   Result Value Ref Range    C-Reactive protein 10.1 (H) 0 - 0.3 mg/dL   METABOLIC PANEL, COMPREHENSIVE    Collection Time: 10/25/22  5:36 AM   Result Value Ref Range    Sodium 141 136 - 145 mmol/L    Potassium 3.8 3.5 - 5.5 mmol/L    Chloride 110 100 - 111 mmol/L    CO2 27 21 - 32 mmol/L    Anion gap 4 3.0 - 18 mmol/L    Glucose 170 (H) 74 - 99 mg/dL    BUN 27 (H) 7.0 - 18 MG/DL    Creatinine 0.49 (L) 0.6 - 1.3 MG/DL    BUN/Creatinine ratio 55 (H) 12 - 20      eGFR >60 >60 ml/min/1.73m2    Calcium 8.0 (L) 8.5 - 10.1 MG/DL    Bilirubin, total 0.5 0.2 - 1.0 MG/DL    ALT (SGPT) 15 13 - 56 U/L    AST (SGOT) 29 10 - 38 U/L    Alk.  phosphatase 43 (L) 45 - 117 U/L    Protein, total 5.2 (L) 6.4 - 8.2 g/dL    Albumin 2.2 (L) 3.4 - 5.0 g/dL    Globulin 3.0 2.0 - 4.0 g/dL    A-G Ratio 0.7 (L) 0.8 - 1.7     GLUCOSE, POC    Collection Time: 10/25/22  8:04 AM   Result Value Ref Range    Glucose (POC) 177 (H) 70 - 110 mg/dL   VANCOMYCIN, RANDOM    Collection Time: 10/25/22  9:18 AM   Result Value Ref Range    Vancomycin, random 11.9 5.0 - 40.0 UG/ML   GLUCOSE, POC    Collection Time: 10/25/22 11:30 AM   Result Value Ref Range    Glucose (POC) 196 (H) 70 - 110 mg/dL   GLUCOSE, POC    Collection Time: 10/25/22  4:58 PM   Result Value Ref Range    Glucose (POC) 275 (H) 70 - 110 mg/dL   GLUCOSE, POC    Collection Time: 10/25/22  9:46 PM   Result Value Ref Range    Glucose (POC) 134 (H) 70 - 110 mg/dL   CBC WITH AUTOMATED DIFF    Collection Time: 10/26/22  6:17 AM   Result Value Ref Range    WBC 13.2 4.6 - 13.2 K/uL    RBC 3.08 (L) 4.20 - 5.30 M/uL    HGB 11.2 (L) 12.0 - 16.0 g/dL    HCT 34.5 (L) 35.0 - 45.0 %    .0 (H) 78.0 - 100.0 FL    MCH 36.4 (H) 24.0 - 34.0 PG    MCHC 32.5 31.0 - 37.0 g/dL    RDW 17.5 (H) 11.6 - 14.5 %    PLATELET 073 348 - 822 K/uL    MPV 9.8 9.2 - 11.8 FL    NRBC 0.0 0  WBC    ABSOLUTE NRBC 0.00 0.00 - 0.01 K/uL    NEUTROPHILS 90 (H) 40 - 73 %    LYMPHOCYTES 9 (L) 21 - 52 %    MONOCYTES 1 (L) 3 - 10 %    EOSINOPHILS 0 0 - 5 %    BASOPHILS 0 0 - 2 %    IMMATURE GRANULOCYTES 1 (H) 0.0 - 0.5 %    ABS. NEUTROPHILS 11.8 (H) 1.8 - 8.0 K/UL    ABS. LYMPHOCYTES 1.1 0.9 - 3.6 K/UL    ABS. MONOCYTES 0.1 0.05 - 1.2 K/UL    ABS. EOSINOPHILS 0.0 0.0 - 0.4 K/UL    ABS. BASOPHILS 0.0 0.0 - 0.1 K/UL    ABS. IMM. GRANS. 0.1 (H) 0.00 - 0.04 K/UL    DF AUTOMATED     METABOLIC PANEL, COMPREHENSIVE    Collection Time: 10/26/22  6:17 AM   Result Value Ref Range    Sodium 144 136 - 145 mmol/L    Potassium 3.7 3.5 - 5.5 mmol/L    Chloride 115 (H) 100 - 111 mmol/L    CO2 24 21 - 32 mmol/L    Anion gap 5 3.0 - 18 mmol/L    Glucose 212 (H) 74 - 99 mg/dL    BUN 36 (H) 7.0 - 18 MG/DL    Creatinine 0.52 (L) 0.6 - 1.3 MG/DL    BUN/Creatinine ratio 69 (H) 12 - 20      eGFR >60 >60 ml/min/1.73m2    Calcium 7.7 (L) 8.5 - 10.1 MG/DL    Bilirubin, total 0.3 0.2 - 1.0 MG/DL    ALT (SGPT) 14 13 - 56 U/L    AST (SGOT) 20 10 - 38 U/L    Alk.  phosphatase 39 (L) 45 - 117 U/L    Protein, total 4.9 (L) 6.4 - 8.2 g/dL    Albumin 2.2 (L) 3.4 - 5.0 g/dL    Globulin 2.7 2.0 - 4.0 g/dL    A-G Ratio 0.8 0.8 - 1.7     PROCALCITONIN    Collection Time: 10/26/22  6:17 AM   Result Value Ref Range    Procalcitonin 0.56 ng/mL   C REACTIVE PROTEIN, QT    Collection Time: 10/26/22  6:17 AM   Result Value Ref Range    C-Reactive protein 6.6 (H) 0 - 0.3 mg/dL   GLUCOSE, POC    Collection Time: 10/26/22  7:12 AM   Result Value Ref Range    Glucose (POC) 195 (H) 70 - 110 mg/dL   GLUCOSE, POC    Collection Time: 10/26/22 11:07 AM Result Value Ref Range    Glucose (POC) 274 (H) 70 - 110 mg/dL       Signed By: Nicole Velasquez MD     October 26, 2022      I spent 25 minutes with the patient in face-to-face consultation, of which greater than 50% was spent in counseling and coordination of care as described above    Disclaimer: Sections of this note are dictated using utilizing voice recognition software. Minor typographical errors may be present. If questions arise, please do not hesitate to contact me or call our department.

## 2022-10-26 NOTE — PROGRESS NOTES
Problem: Self Care Deficits Care Plan (Adult)  Goal: *Acute Goals and Plan of Care (Insert Text)  Description: Occupational Therapy Goals  Initiated 10/24/2022 within 7 day(s). 1.  Patient will perform grooming with modified independence. 2.  Patient will perform bathing with modified independence. 3.  Patient will perform upper body dressing and lower body dressing with modified independence. 4.  Patient will perform toilet transfers with modified independence using RW with good balance. 5.  Patient will perform all aspects of toileting with modified independence. 6.  Patient will participate in upper extremity therapeutic exercise/activities with modified independence for 8 minutes. 7.  Patient will utilize energy conservation techniques during functional activities with min verbal cues. Prior Level of Function: pt presents with confusion and unable to provide accurate PLOF, does report she used a RW for functional mobility \"all of the time\"     Outcome: Progressing Towards Goal   OCCUPATIONAL THERAPY TREATMENT    Patient: Niall Sorenson (61 y.o. female)  Date: 10/26/2022  Diagnosis: COVID [U07.1]  SIRS (systemic inflammatory response syndrome) ([facility] [R65.10]  Atrial fibrillation ([facility] [I48.91] Severe sepsis ([facility]      Precautions: Fall, Contact  PLOF: pt presents with confusion and unable to provide accurate PLOF, does report she used a RW for functional mobility \"all of the time\"    Chart, occupational therapy assessment, plan of care, and goals were reviewed. ASSESSMENT:  Pt presented supine in bed upon entry, finishing her lunch, and agreeable for participation. Pt is very [place] requiring much repetition. Pt came into long sitting MIN A in prep for ADL task. Once in long sitting, She performed facial and hand hygiene with Supervision. Pt then reports she felt wet. She returned to supine MIN A and was found to be soiled from loose BM.  She rolled L/R MIN A to change soiled chux pads and MAX A for linda area hygiene. Pt requesting to finishing her lunch and was positioned for comfort. She was left with HOB elevated, lunch placed in front of her, bed alarm active, and all needs left within reach. RN made aware. Progression toward goals:  []          Improving appropriately and progressing toward goals  [x]          Improving slowly and progressing toward goals  []          Not making progress toward goals and plan of care will be adjusted     PLAN:  Patient continues to benefit from skilled intervention to address the above impairments. Continue treatment per established plan of care. Further Equipment Recommendations for Discharge:  bedside commode, shower chair, and rolling walker    AMPAC: Current research shows that an AM-PAC score of 17 or less is not associated with a discharge to the patient's home setting. Based on an AM-PAC score of 15/24 and their current ADL deficits; it is recommended that the patient have 3-5 sessions per week of Occupational Therapy at d/c to increase the patient's independence. This AMPAC score should be considered in conjunction with interdisciplinary team recommendations to determine the most appropriate discharge setting. Patient's social support, diagnosis, medical stability, and prior level of function should also be taken into consideration. SUBJECTIVE:   Patient stated I am wet.     OBJECTIVE DATA SUMMARY:   Cognitive/Behavioral Status:  Neurologic State: Alert  Orientation Level: Oriented to person, Oriented to place  Cognition: Appropriate decision making, Follows commands  Safety/Judgement: Fall prevention    Functional Mobility and Transfers for ADLs:   Bed Mobility:  Rolling: Minimum assistance  Supine to Sit: Minimum assistance  Sit to Supine: Minimum assistance      Balance:  Sitting: Impaired  Sitting - Static: Fair (occasional)  Sitting - Dynamic: Fair (occasional)    ADL Intervention:       Grooming  Position Performed: Long sitting on bed  Washing Face: Supervision  Washing Hands: Supervision         Lower Body Bathing  Perineal  : Maximum assistance  Position Performed: Supine      Pain:  Pain level pre-treatment: 0/10   Pain level post-treatment: 0/10      Activity Tolerance:    Fair   Please refer to the flowsheet for vital signs taken during this treatment. After treatment:   []  Patient left in no apparent distress sitting up in chair  [x]  Patient left in no apparent distress in bed  [x]  Call bell left within reach  [x]  Nursing notified  []  Caregiver present  [x]  Bed alarm activated    COMMUNICATION/EDUCATION:   [x] Role of Occupational Therapy in the acute care setting  [] Home safety education was provided and the patient/caregiver indicated understanding. [x] Patient/family have participated as able in working towards goals and plan of care. [x] Patient/family agree to work toward stated goals and plan of care. [] Patient understands intent and goals of therapy, but is neutral about his/her participation. [] Patient is unable to participate in goal setting and plan of care. Thank you for this referral.  TERESE Perez  Time Calculation: 38 mins    [address] [place] [id number] AM-PAC® Daily Activity Inpatient Short Form (6-Clicks)    How much HELP from another person does the patient currently need    (If the patient hasn't done an activity recently, how much help from another person do you think he/she would need if he/she tried?)   Total (Total A or Dep)   A Lot  (Mod to Max A)   A Little (Sup or Min A)   None (Mod I to I)   Putting on and taking off regular lower body clothing? [] 1 [x] 2 [] 3 [] 4   2. Bathing (including washing, rinsing,      drying)? [] 1 [x] 2 [] 3 [] 4   3. Toileting, which includes using toilet, bedpan or urinal?   [] 1 [x] 2 [] 3 [] 4   4. Putting on and taking off regular upper body clothing? [] 1 [] 2 [x] 3 [] 4   5. Taking care of personal grooming such as brushing teeth?    [] 1 [] 2 [x] 3 [] 4   6. Eating meals?    [] 1 [] 2 [x] 3 [] 4

## 2022-10-27 VITALS
TEMPERATURE: 97.9 F | RESPIRATION RATE: 16 BRPM | HEART RATE: 91 BPM | HEIGHT: 67 IN | BODY MASS INDEX: 16.68 KG/M2 | OXYGEN SATURATION: 100 % | SYSTOLIC BLOOD PRESSURE: 149 MMHG | WEIGHT: 106.26 LBS | DIASTOLIC BLOOD PRESSURE: 76 MMHG

## 2022-10-27 LAB
ALBUMIN SERPL-MCNC: 2 G/DL (ref 3.4–5)
ALBUMIN/GLOB SERPL: 0.7 {RATIO} (ref 0.8–1.7)
ALP SERPL-CCNC: 50 U/L (ref 45–117)
ALT SERPL-CCNC: 24 U/L (ref 13–56)
ANION GAP SERPL CALC-SCNC: 5 MMOL/L (ref 3–18)
AST SERPL-CCNC: 37 U/L (ref 10–38)
BILIRUB SERPL-MCNC: 0.2 MG/DL (ref 0.2–1)
BUN SERPL-MCNC: 31 MG/DL (ref 7–18)
BUN/CREAT SERPL: 56 (ref 12–20)
CALCIUM SERPL-MCNC: 7.3 MG/DL (ref 8.5–10.1)
CHLORIDE SERPL-SCNC: 116 MMOL/L (ref 100–111)
CO2 SERPL-SCNC: 23 MMOL/L (ref 21–32)
CREAT SERPL-MCNC: 0.55 MG/DL (ref 0.6–1.3)
GLOBULIN SER CALC-MCNC: 2.9 G/DL (ref 2–4)
GLUCOSE BLD STRIP.AUTO-MCNC: 139 MG/DL (ref 70–110)
GLUCOSE BLD STRIP.AUTO-MCNC: 165 MG/DL (ref 70–110)
GLUCOSE BLD STRIP.AUTO-MCNC: 207 MG/DL (ref 70–110)
GLUCOSE SERPL-MCNC: 92 MG/DL (ref 74–99)
POTASSIUM SERPL-SCNC: 4.1 MMOL/L (ref 3.5–5.5)
PROT SERPL-MCNC: 4.9 G/DL (ref 6.4–8.2)
SODIUM SERPL-SCNC: 144 MMOL/L (ref 136–145)

## 2022-10-27 PROCEDURE — 99239 HOSP IP/OBS DSCHRG MGMT >30: CPT | Performed by: HOSPITALIST

## 2022-10-27 PROCEDURE — 80053 COMPREHEN METABOLIC PANEL: CPT

## 2022-10-27 PROCEDURE — 74011250637 HC RX REV CODE- 250/637: Performed by: HOSPITALIST

## 2022-10-27 PROCEDURE — 82962 GLUCOSE BLOOD TEST: CPT

## 2022-10-27 PROCEDURE — 74011250636 HC RX REV CODE- 250/636: Performed by: STUDENT IN AN ORGANIZED HEALTH CARE EDUCATION/TRAINING PROGRAM

## 2022-10-27 PROCEDURE — 77030037878 HC DRSG MEPILEX >48IN BORD MOLN -B

## 2022-10-27 PROCEDURE — 74011250637 HC RX REV CODE- 250/637: Performed by: PHYSICIAN ASSISTANT

## 2022-10-27 PROCEDURE — 36415 COLL VENOUS BLD VENIPUNCTURE: CPT

## 2022-10-27 PROCEDURE — 74011636637 HC RX REV CODE- 636/637: Performed by: HOSPITALIST

## 2022-10-27 PROCEDURE — 2709999900 HC NON-CHARGEABLE SUPPLY

## 2022-10-27 PROCEDURE — 74011000250 HC RX REV CODE- 250: Performed by: PHYSICIAN ASSISTANT

## 2022-10-27 RX ORDER — DEXAMETHASONE 4 MG/1
TABLET ORAL
Qty: 4 TABLET | Refills: 0 | Status: SHIPPED | OUTPATIENT
Start: 2022-10-28 | End: 2022-10-31

## 2022-10-27 RX ORDER — CHOLESTYRAMINE 4 G/4.8G
4 POWDER, FOR SUSPENSION ORAL 2 TIMES DAILY WITH MEALS
Qty: 30 PACKET | Refills: 0 | Status: SHIPPED
Start: 2022-10-27

## 2022-10-27 RX ORDER — CHOLESTYRAMINE 4 G/4.8G
4 POWDER, FOR SUSPENSION ORAL 2 TIMES DAILY WITH MEALS
Status: DISCONTINUED | OUTPATIENT
Start: 2022-10-27 | End: 2022-10-28 | Stop reason: HOSPADM

## 2022-10-27 RX ORDER — DIGOXIN 125 MCG
0.12 TABLET ORAL EVERY OTHER DAY
Qty: 10 TABLET | Refills: 0 | Status: SHIPPED | OUTPATIENT
Start: 2022-10-29

## 2022-10-27 RX ADMIN — SODIUM CHLORIDE, PRESERVATIVE FREE 10 ML: 5 INJECTION INTRAVENOUS at 13:14

## 2022-10-27 RX ADMIN — CHOLESTYRAMINE 4 G: 4 POWDER, FOR SUSPENSION ORAL at 12:05

## 2022-10-27 RX ADMIN — Medication 5 UNITS: at 08:56

## 2022-10-27 RX ADMIN — DIGOXIN 0.12 MG: 125 TABLET ORAL at 12:04

## 2022-10-27 RX ADMIN — SODIUM CHLORIDE, PRESERVATIVE FREE 10 ML: 5 INJECTION INTRAVENOUS at 08:56

## 2022-10-27 RX ADMIN — Medication 6 UNITS: at 16:53

## 2022-10-27 RX ADMIN — ASPIRIN 81 MG: 81 TABLET, COATED ORAL at 08:55

## 2022-10-27 RX ADMIN — CHOLESTYRAMINE 4 G: 4 POWDER, FOR SUSPENSION ORAL at 16:53

## 2022-10-27 RX ADMIN — METHIMAZOLE 5 MG: 5 TABLET ORAL at 08:55

## 2022-10-27 RX ADMIN — Medication 3 UNITS: at 09:07

## 2022-10-27 RX ADMIN — THERA TABS 1 TABLET: TAB at 08:55

## 2022-10-27 RX ADMIN — ENOXAPARIN SODIUM 60 MG: 100 INJECTION SUBCUTANEOUS at 08:55

## 2022-10-27 NOTE — PROGRESS NOTES
Transition of Care Plan to SNF/Rehab    SNF/Rehab Transition:  Patient has been accepted to [facility] and meets criteria for admission. Patient will transported by [address] and expected to leave at   [facility] and face sheet at [address] to [address] with 130pm request time. Have called [facility] 20 times and cannot get reception on phone. [facility] will set up trip with fax pcs . CM will keep trying for time   CALL REPORT -[phone]  Communication to Patient/Family:  Met with patient and stepdaughter (identified care giver) and they are agreeable to the transition plan. Communication to SNF/Rehab:  Bedside RN, Christian Roberts , has been notified to update the transition plan to the facility and call report     Discharge information has been updated on the AVS.             Nursing Please include all hard scripts for controlled substances, med rec and dc summary, and AVS in packet. Reviewed and confirmed with facility, April at [facility] , can manage the patient care needs for the following:     Maren Burnett with (X) only those applicable:    Medication:  [x]  Medications will be available at the facility  []  IV Antibiotics   []  Controlled Substance - hard copy to be sent with patient   []  Weekly Labs   Documents:  [x] Hard RX  [x] MAR  [x] Kardex  [x] AVS  [x]Transfer Summary  [x]Discharge   Equipment:  []  CPAP/BiPAP  []  Wound Vacuum  []  Parson or Urinary Device  []  PICC/Central Line  []  Nebulizer  []  Ventilator   Treatment:  [x]Isolation (for MRSA, VRE, etc.)  []Surgical Drain Management  []Tracheostomy Care  []Dressing Changes  []Dialysis with transportation and chair time   []PEG Care  []Oxygen  []Daily Weights for Heart Failure   Dietary:  [x]Any diet limitations  []Tube Feedings   []Total Parenteral Management (TPN)   Eligible for Medicaid Long Term Services and Supports  Yes:  [] Eligible for medical assistance or will become eligible within 180 days and UAI completed.    [] Provider/Patient and/or support system has requested screening. [x] UAI copy provided to patient or responsible party,  [] UAI unavailable at discharge will send once processed to SNF provider. [] UAI unavailable at discharged mailed to patient  No:   [] Private pay and is not financially eligible for Medicaid within the next 180 days. [] Reside out-of-state.   [] A residents of a state owned/operated facility that is licensed  by [address] [facility] [facility] or [facility]  [] Enrollment in [facility] hospice services  [] [address] [place] [address]  [] Patient /Family declines to have screening completed or provide financial information for screening     Financial Resources:  Medicaid    [] Initiated and application pending   [] Full coverage     Advanced Care Plan:  []Surrogate Decision Maker of Care  []POA  [x]Communicated Code Status DNR (DDNR\", \"Full\")    Other

## 2022-10-27 NOTE — PROGRESS NOTES
Discharge/Transition Planning     Called around for SNF facilities. Spoke with [facility] and they have covid unit and can take today . Called step daughter and she stated that is fine and agreeable. Sent perfect serve to Dr Jeremi Simpson and notified and asked if can send today       1300: Completed UAI in [facility] system.  Requested signature from Dr Anuradha Cohn # : [id number]

## 2022-10-27 NOTE — PROGRESS NOTES
Wound Prevention Checklist    Patient: She Forrest (76 y.o. female)  Date: 10/27/2022  Diagnosis: COVID [U07.1]  SIRS (systemic inflammatory response syndrome) (HCC) [R65.10]  Atrial fibrillation (HCC) [I48.91] Severe sepsis ([facility] [address]    Precautions: Fall, Contact       [x]  Heel prevention boots placed on patient    [x]  Patient turned q2h during shift    [x]  Lift team ordered    [x]  Patient on [place] bed/Specialty bed    [x]  Each Wound is documented during shift (Stage, Color, drainage, odor, measurements, and dressings)    [x]  Dual skin check done with Vicki Jimenez RN

## 2022-10-27 NOTE — DISCHARGE SUMMARY
Hospitalist Discharge Summary    Patient: Frank Johnson MRN: [id number]  CSN: [id number]    YOB: 1930  Age: 80 y.o. Sex: female    DOA: 10/23/2022 LOS:  LOS: 4 days   Discharge Date:     Admission Diagnoses: COVID [U07.1]  SIRS (systemic inflammatory response syndrome) ([facility] [address] [R65.10]  Atrial fibrillation ([facility] [address] [I48.91]    Discharge Diagnoses:    Severe sepsis  Acute hypoxic respiratory failure-resolved  COVID-19 infection  History of hyperthyroidism  History of chronic atrial fibrillation  Baseline dementia    Discharge Condition: Fair    Discharge To: SNF    CODE STATUS: DNR      PHYSICAL EXAM  Visit Vitals  /64 (BP 1 Location: Left upper arm, BP Patient Position: At rest)   Pulse 84   Temp 96.8 °F (36 °C)   Resp 16   Ht 5' 7\" (1.702 m)   Wt 48.2 kg (106 lb 4.2 oz)   SpO2 100%   BMI 16.64 kg/m²       General: Alert, cooperative, no acute distress    Lungs:  CTA Bilaterally. No Wheezing/Rhonchi/Rales. Heart:  Regular rate and Rhythm. Abdomen: Soft, Non distended, Non tender. + Bowel sounds. Extremities: No edema/ cyanosis/ clubbing  Neurologic:  Alert and awake, extremely hard of hearing, baseline dementia                              Frank Johnson is a 80 y.o. female with a PMHx of dementia, hearing loss, angina who presented to the ED with c/o being found down. She is unable to provide further information due to confusion/dementia. Her step daughter, Dona Henriquez, provided the majority of the history. Apparently her niece visited on Tuesday and found out she had covid after she left. The patient first showed symptoms on Friday (10/21) which consisted of nasal congestion, but she felt well enough to go out to lunch. Today a neighbor saw her lying on the ground through a window and called EMS. When Dona Henriquez arrived, the patient was complaining of back pain and a headache.  The headache reportedly resolved after oxygen was applied - SpO2 was 88% initially, but improved to 96% with O2 per EMS. The patient does live alone. Gadiel Wood states she is POA and is in agreement with current POST on file. She had her first two COVID vaccines and a booster. In the ED, she is persistently febrile at 101. HR and BP stable. ABG with mild hypoxemia (pO2 70). Lactic 3.2. WBC 15.3, bands 14. UA is not c/w UTI. Ddimer 0.78. Creatinine at baseline, BUN slightly elevated at 26. . Rapid COVID positive. CXR pending. Hospital Course:     27-year-old female presented to the emergency room secondary to confusion. Patient has a history of baseline dementia. Patient was noted to be hypoxic in the emergency room and tested positive for COVID-19 infection. ID consulted-recommended to discontinue Zosyn, continue vancomycin as well as add Decadron and remdesivir. Patient's blood cultures initially positive for gram-positive cocci which  later was noted to be contaminant and vancomycin was stopped. Patient has been switched to p.o. Decadron. Remdesivir completed. Patient was noted to have elevated heart rate. Patient has a history of chronic atrial fibrillation, low-dose digoxin added and heart rate has improved. No anticoagulation secondary to high risk of falls. Patient also developed diarrhea and Questran was added. Patient was seen by PT and OT and is currently being discharged to skilled nursing facility      Follow up Care: With PCP in 2 weeks    Consults: Infectious Disease    Significant Diagnostic Studies:     Imaging:  XR Results (most recent):  Results from Hospital Encounter encounter on 10/23/22    XR CHEST PORT    Narrative  EXAM: XR CHEST PORT    Indications: evaluate for covid pneumonia    Comparison: Most recently prior day    Findings: Moderately rotated to the left. Lines/Tubes/Devices:  None  LUNGS: There is some haziness at the right infrahilar area, that looks primarily  reticular as from vasculature. Hemidiaphragms remain evident. No pleural fluid.   MEDIASTINUM: Senescent calcifications of the tracheobronchial tree. Normal heart  size. Mild atherosclerosis of the aorta. 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 Prior right  shoulder surgery marginally imaged. Dystrophic calcification right breast  tissues again noted. Impression  :    1. No convincing pneumonia. Favor vascular shadows right infrahilar region. CT Results (most recent):  Results from Hospital Encounter encounter on 10/23/22    CT SPINE CERV WO CONT    Narrative  CT CERVICAL SPINE    REASON FOR EXAM: fall  COMPARISON: Reference to CT soft tissue neck from 12/29/2015, CT cervical spine  12/29/2015. MRI cervical spine 8/8/2013    TECHNIQUE: 2.5 mm helically acquired images from the base of skull to the lung  apex. Sagittal and coronal reformations were obtained for better evaluation of  alignment, disk space height, interfacet relations, and vertebral integrity. All CT scans at this facility are performed using dose optimization technique as  appropriate to a performed exam, to include automated exposure control,  adjustment of the MA and/or kV according to patient size (including appropriate  matching for site-specific examinations) or use of  iterative reconstruction  technique. FINDINGS:    Alignment: Slightly accentuated cervical lordosis, similar findings on the  comparison exams. Trace amount of anterolisthesis of C6 on C7 unchanged. Vertebral body heights: Normal.    Fracture: None. Degenerative changes: Significant multilevel degenerative changes, mostly  involving the facet joints. Autofusion of the right C2-C3 and right C4-C5 facet  joints. Hypertrophic changes in the left C3/C4, right C5/C6 facet joints. There  does not appear to be any critical central spinal canal narrowing. Soft tissues: The prevertebral and paraspinous soft tissues show no acute  findings. There are multiple small thyroid nodules, decreased in size from 2015  CT.     Visualized lung apices: There are areas of pleural/parenchymal scarring in the  lung apices that appears stable from 2015 CT cervical spine. Impression  No findings of acute cervical spine fracture. Multilevel degenerative changes in the cervical spine with some associated mild  anterolisthesis of C6 on C7. Similar findings on the CT from 2015.      02/09/22    ECHO ADULT COMPLETE 02/10/2022 2/10/2022    Interpretation Summary    Left Ventricle: Left ventricle size is normal. Normal wall thickness. Normal wall motion. Normal left ventricular systolic function with a visually estimated EF of 55 - 60%. Normal diastolic function. Interatrial Septum: Agitated saline study was negative with and without provocation. Pulmonary Arteries: Pulmonary hypertension not present. The estimated pulmonary artery systolic pressure is 31 mmHg. Signed by: Ashwin Kaminski MD on 2/10/2022 11:15 AM       MRI Results (most recent):  Results from [place] encounter on 02/09/22    MRI BRAIN WO CONT    Narrative  EXAM: MRI of the Head without contrast    INDICATION: rule out cva dysarthria. TECHNIQUE: MRI of the head without IV contrast. Multiplanar multisequence MR  images of the brain without contrast.    IV contrast:  None    COMPARISON: Head CT dated 2/9/2021    FINDINGS: No focus of restricted diffusion appreciated. The ventricles and sulci  are symmetric but moderately enlarged. Moderately extensive white matter  hyperintensities on FLAIR weighted imaging are noted in the periventricular and  subcortical white matter. No midline shift, mass effect, or mass lesion  appreciated. The grey-white junction is intact. No evidence for an acute  infarct identified. No focus of abnormal susceptibility appreciated. The  vascular flow voids are intact. The cerebellopontine angles are unremarkable. The visualized internal auditory canals and semicircular canals are  unremarkable.  The pituitary is normal. The mastoid air cells are unremarkable. The visualized paranasal sinuses are unremarkable. The orbits are unremarkable. The scalp and skull are unremarkable. Impression  1. No acute intracranial process. 2.  Moderately extensive parenchymal volume loss and chronic small vessel  ischemic changes. Procedures:     None       Current Discharge Medication List        START taking these medications    Details   digoxin (LANOXIN) 0.125 mg tablet Take 1 Tablet by mouth every other day. Qty: 10 Tablet, Refills: 0  Start date: 10/29/2022      dexAMETHasone (DECADRON) 4 mg tablet Take 1 tab PO q DAILY  Qty: 4 Tablet, Refills: 0  Start date: 10/28/2022, End date: 10/31/2022      cholestyramine-aspartame (QUESTRAN LIGHT) 4 gram packet Take 1 Packet by mouth two (2) times daily (with meals). Qty: 30 Packet, Refills: 0  Start date: 10/27/2022           CONTINUE these medications which have NOT CHANGED    Details   methIMAzole (TAPAZOLE) 5 mg tablet Take 5 mg by mouth daily. cholecalciferol (VITAMIN D3) 25 mcg (1,000 unit) cap Take 1,000 Units by mouth daily. atorvastatin (LIPITOR) 20 mg tablet Take 1 Tablet by mouth nightly. Qty: 30 Tablet, Refills: 0      calcium carbonate (TUMS) 200 mg calcium (500 mg) chew Take 1 Tablet by mouth three (3) times daily as needed for PRN Reason (Other) (GERD). Qty: 20 Tablet, Refills: 0      amLODIPine (NORVASC) 2.5 mg tablet Take 1 Tablet by mouth daily. Qty: 30 Tablet, Refills: 0      tolterodine ER (DETROL-LA) 2 mg ER capsule Take 2 mg by mouth daily. aspirin delayed-release 81 mg tablet Take 1 Tab by mouth daily. Qty: 30 Tab, Refills: 1      omeprazole (PRILOSEC) 20 mg capsule Take 20 mg by mouth daily. donepeziL (ARICEPT) 5 mg tablet Take 5 mg by mouth nightly.            STOP taking these medications       colestipoL (COLESTID) 1 gram tablet Comments:   Reason for Stopping:         nitroglycerin (NITROSTAT) 0.4 mg SL tablet Comments:   Reason for Stopping: Activity: Activity as tolerated    Diet: Cardiac Diet    Wound Care: None needed      Leana Lion MD  10/27/2022, 12:57 PM    Total time spent 38 mins  Disclaimer: Sections of this note are dictated using utilizing voice recognition software. Minor typographical errors may be present. If questions arise, please do not hesitate to contact me or call our department.

## 2022-10-27 NOTE — PROGRESS NOTES
Discharge/Transition Planning         Was able to get [facility] on phone after several hours. Transport available at [place] .  Notified patient RN, [place] [facility] and called family

## 2022-10-27 NOTE — PROGRESS NOTES
1600: Discharge instructions reviewed with patient all questions answered. PIV and tele removed. Report called to [address] [place]. Patient stable for discharge with [facility] transport scheduled for 1900.

## 2022-10-27 NOTE — DIABETES MGMT
Diabetes/ Glycemic Control Plan of Care    Following for COVID-19 infection and steroids  IV steroids switched to oral   BG improved with low dose of lantus  Plan for SNF at discharge   Will continue to monitor    Steroids:   Rx Glucocorticoids (24h ago, onward)       Start     Dose Route Frequency Ordered Stop    10/26/22 2000  dexAMETHasone (DECADRON) tablet 6 mg         6 mg PO DAILY 10/26/22 1651 --             Blood glucose values: Within target range (70-180mg/dL):  yes    Current insulin orders:   Lantus 5 units daily  Corrective humalog  Total Daily Dose previous 24 hours = 14 units   5 units lantus   9 units humalog    Current A1c:   Lab Results   Component Value Date/Time    Hemoglobin A1c 5.2 10/24/2022 03:57 AM      Nutrition/Diet:   Active Orders   Diet    ADULT DIET Dysphagia - Soft & Bite Sized; Moderately Thick (Honey)      Home diabetes medications: none    Plan/Goals:   Blood glucose will be within target of 70 - 180 mg/dl within 72 hours  Reinforce dietary and medication compliance at home.           Education:  [] Refer to Diabetes Education Record                       [x] Education not indicated at this time     Charlotte Vergara MPH RN [address] [place] [address] [phone]  Perfect Serve

## 2022-10-27 NOTE — ROUTINE PROCESS
Wound Prevention Checklist    Patient: Lea Baig (16 y.o. female)  Date: 10/27/2022  Diagnosis: COVID [U07.1]  SIRS (systemic inflammatory response syndrome) (HCC) [R65.10]  Atrial fibrillation (HCC) [I48.91] Severe sepsis (HCC)    Precautions: Fall, Contact       [x]  Heel prevention boots placed on patient        Heel mepelix placed on each foot        Foot elevated on a pillow        Socks placed    [x]  Patient turned q2h during shift    [x]  Lift team ordered    []  Patient on [place] bed/Specialty bed    [x]  Each Wound is documented during shift (Stage, Color, drainage, odor, measurements, and dressings)        Mepelix placed on patient upper back.  ([place])  [x]  Dual skin check done with Jamal Funes RN

## 2022-10-27 NOTE — DISCHARGE INSTRUCTIONS
DISCHARGE SUMMARY from Nurse    PATIENT INSTRUCTIONS:    After general anesthesia or intravenous sedation, for 24 hours or while taking prescription Narcotics:  Limit your activities  Do not drive and operate hazardous machinery  Do not make important personal or business decisions  Do  not drink alcoholic beverages  If you have not urinated within 8 hours after discharge, please contact your surgeon on call. Report the following to your surgeon:  Excessive pain, swelling, redness or odor of or around the surgical area  Temperature over 100.5  Nausea and vomiting lasting longer than 4 hours or if unable to take medications  Any signs of decreased circulation or nerve impairment to extremity: change in color, persistent  numbness, tingling, coldness or increase pain  Any questions    What to do at Home:  Recommended activity: Activity as tolerated    If you experience any of the following symptoms increased shortness of breath, dizziness, worening cough, temperature, please follow up with PCP. *  Please give a list of your current medications to your Primary Care Provider. *  Please update this list whenever your medications are discontinued, doses are      changed, or new medications (including over-the-counter products) are added. *  Please carry medication information at all times in case of emergency situations. These are general instructions for a healthy lifestyle:    No smoking/ No tobacco products/ Avoid exposure to second hand smoke  Surgeon General's Warning:  Quitting smoking now greatly reduces serious risk to your health.     Obesity, smoking, and sedentary lifestyle greatly increases your risk for illness    A healthy diet, regular physical exercise & weight monitoring are important for maintaining a healthy lifestyle    You may be retaining fluid if you have a history of heart failure or if you experience any of the following symptoms:  Weight gain of 3 pounds or more overnight or 5 pounds in a week, increased swelling in our hands or feet or shortness of breath while lying flat in bed. Please call your doctor as soon as you notice any of these symptoms; do not wait until your next office visit. The discharge information has been reviewed with the patient. The patient verbalized understanding. Discharge medications reviewed with the patient and appropriate educational materials and side effects teaching were provided.   ___________________________________________________________________________________________________________________________________

## 2022-10-27 NOTE — ROUTINE PROCESS
Bedside and verbal report received from [address] [place] [address] Giselle (offgoing nurse). Report included the following information; SBAR, MAR, LABS, Intake/output, Kardex, and summary of care. Patient resting quietly in bed. No noted distress at this time.

## 2022-10-27 NOTE — PROGRESS NOTES
New OT order received and chart reviewed. Patient evaluated and currently on skilled OT caseload. Will acknowledge the order.   Thank you for the referral.  Rosette Nash MS OTR/L

## 2022-10-27 NOTE — PROGRESS NOTES
New PT order on 10/26/22/ seen and acknowledged as patient is currently on PT caseload.   Reid Carter, PT

## 2022-10-28 ENCOUNTER — PATIENT OUTREACH (OUTPATIENT)
Dept: CASE MANAGEMENT | Age: 87
End: 2022-10-28

## 2022-10-28 NOTE — PROGRESS NOTES
Care Transitions Initial Note    Call within 2 business days of discharge: N/A, Discharged to SNF    Patient: Mini Li Patient : 1930 MRN: [id number]    Last Discharge  Street       Date Complaint Diagnosis Description Type Department Provider    10/23/22 Fall  ED to Hosp-Admission (Discharged) (ADMIT) Shabbir Cabrera MD; Krystina Mcguire... Patient was admitted to [facility] from 10/23/22 to 10/27/22 for sepsis and acute hypoxic respiratory failure 2/2 COVID-19. Patient was discharged to [facility]. Was this an external facility discharge? No Discharge Facility: [facility]      Transition of care outreach postponed for 14 days due to patient's discharge to SNF.

## 2022-10-29 LAB
BACTERIA SPEC CULT: NORMAL
SERVICE CMNT-IMP: NORMAL

## 2022-10-30 LAB
BACTERIA SPEC CULT: NORMAL
SERVICE CMNT-IMP: NORMAL

## 2022-11-10 ENCOUNTER — PATIENT OUTREACH (OUTPATIENT)
Dept: CASE MANAGEMENT | Age: 87
End: 2022-11-10

## 2022-11-10 NOTE — PROGRESS NOTES
Care Transitions Nurse (CTN) contacted [facility] and confirmed that patient is still admitted to their facility. CTN attempted to reach the  and left a detailed VMM requesting a return call with an update on expected discharge date/plan. Plan for follow up call next week to the SNF to verify patient's admission status.

## 2022-11-16 ENCOUNTER — PATIENT OUTREACH (OUTPATIENT)
Dept: CASE MANAGEMENT | Age: 87
End: 2022-11-16

## 2022-11-16 NOTE — PROGRESS NOTES
Care Transitions Nurse (CTN) contacted [facility] and confirmed that patient is still admitted to their facility. CTN left a detailed VMM for  requesting a return call to discuss potential discharge plans. Plan for follow up call next week to the SNF to verify patient's admission status.

## 2022-11-25 ENCOUNTER — PATIENT OUTREACH (OUTPATIENT)
Dept: CASE MANAGEMENT | Age: 87
End: 2022-11-25

## 2022-11-25 NOTE — PROGRESS NOTES
Patient has graduated from the Transitions of Care Coordination  program on 11/25/22. Care Transitions Nurse (CTN) contacted [facility] and confirmed that patient is still admitted to their facility. Patient was not referred to the [facility] team for further management. Patients upcoming visits:  No future appointments.